# Patient Record
Sex: FEMALE | Race: WHITE | Employment: OTHER | URBAN - METROPOLITAN AREA
[De-identification: names, ages, dates, MRNs, and addresses within clinical notes are randomized per-mention and may not be internally consistent; named-entity substitution may affect disease eponyms.]

---

## 2017-02-23 ENCOUNTER — HOSPITAL ENCOUNTER (OUTPATIENT)
Dept: CT IMAGING | Age: 77
Discharge: HOME | End: 2017-02-23
Attending: INTERNAL MEDICINE
Payer: MEDICARE

## 2017-02-23 DIAGNOSIS — R10.84 GENERALIZED ABDOMINAL PAIN: ICD-10-CM

## 2017-02-23 LAB — CREAT BLD-MCNC: 0.9 MG/DL (ref 0.6–1)

## 2017-02-23 PROCEDURE — 74011636320 HC RX REV CODE- 636/320: Performed by: INTERNAL MEDICINE

## 2017-02-23 PROCEDURE — 74011000258 HC RX REV CODE- 258: Performed by: INTERNAL MEDICINE

## 2017-02-23 PROCEDURE — 74177 CT ABD & PELVIS W/CONTRAST: CPT

## 2017-02-23 PROCEDURE — 82565 ASSAY OF CREATININE: CPT

## 2017-02-23 RX ORDER — SODIUM CHLORIDE 0.9 % (FLUSH) 0.9 %
10 SYRINGE (ML) INJECTION
Status: COMPLETED | OUTPATIENT
Start: 2017-02-23 | End: 2017-02-23

## 2017-02-23 RX ADMIN — Medication 10 ML: at 14:54

## 2017-02-23 RX ADMIN — IOVERSOL 100 ML: 741 INJECTION INTRA-ARTERIAL; INTRAVENOUS at 14:54

## 2017-02-23 RX ADMIN — DIATRIZOATE MEGLUMINE AND DIATRIZOATE SODIUM 15 ML: 660; 100 LIQUID ORAL; RECTAL at 14:55

## 2017-02-23 RX ADMIN — SODIUM CHLORIDE 100 ML: 900 INJECTION, SOLUTION INTRAVENOUS at 14:54

## 2017-02-27 PROBLEM — C17.0 DUODENAL CANCER (HCC): Status: ACTIVE | Noted: 2017-02-27

## 2017-03-07 ENCOUNTER — PATIENT OUTREACH (OUTPATIENT)
Dept: CASE MANAGEMENT | Age: 77
End: 2017-03-07

## 2017-03-07 ENCOUNTER — HOSPITAL ENCOUNTER (OUTPATIENT)
Dept: PHYSICAL THERAPY | Age: 77
Discharge: HOME | End: 2017-03-07
Payer: MEDICARE

## 2017-03-07 PROCEDURE — G9172 VOICE GOAL STATUS: HCPCS

## 2017-03-07 PROCEDURE — G9171 VOICE CURRENT STATUS: HCPCS

## 2017-03-07 PROCEDURE — 92524 BEHAVRAL QUALIT ANALYS VOICE: CPT

## 2017-03-07 PROCEDURE — G9173 VOICE D/C STATUS: HCPCS

## 2017-03-07 NOTE — PROGRESS NOTES
Denilsonnati Cantu  : [facility] [place] [address], [place] [address].  [phone]   [phone]         OUTPATIENT SPEECH LANGUAGE PATHOLOGY: Initial Assessment and Discharge    ICD-10: Treatment Diagnosis: dysphonia R 49.0  REFERRING PHYSICIAN: Paolo Taylor MD Orders: speech therapy   Return Physician Appointment:   PAST MEDICAL HISTORY:   Ms. Galina Deng is a 64 y.o. female who  has a past medical history of Arthritis; Asthma; Cancer ([facility] [address] GERD (gastroesophageal reflux disease); Hypotension; IBS (irritable bowel syndrome); Insomnia; Mass of ampulla of Vater; Orthostatic hypotension; Parkinson's disease ([facility] [address] (dx ); Psychiatric disorder; PUD (peptic ulcer disease); Syncope and collapse; Unspecified adverse effect of anesthesia; and Weight loss. She also has no past medical history of Adverse effect of anesthesia; Difficult intubation; Malignant hyperthermia due to anesthesia; Nausea & vomiting; or Pseudocholinesterase deficiency. She also  has a past surgical history that includes ercp (2016); endoscopy (2016); lap cholecystectomy (); hysterectomy (); orthopaedic (Right, ); colonoscopy; gi (2012); gi (2016 at [facility]); hernia repair ( at [facility]); and gi (2016). MEDICAL/REFERRING DIAGNOSIS: Parkinson's disease [G20]  DATE OF ONSET:   PRIOR LEVEL OF FUNCTION: residing with spouse  PRECAUTIONS/ALLERGIES: Aspirin, morpheme  ASSESSMENT:  Mrs. Galina Deng is a 63 y/o female at the movement disorder clinic this date. She reported having internal head tremors in . She reported she went to two neurologists at that time and was told nothing was wrong. She told her she was given valium by each of them. She said she went to another neurologist in  and was told she has \"Parkison's in my head\". She reported she was placed on Sinemet and within two weeks the tremors went away.   She quit taking the valium at that time. She reported she takes it PRN. She was also put on a nebulizer. She reported she was in the hospital [facility] from [address] [place] [address] due to having a Whipple procedure. She reported no difficulties with her speech other than anomia at times. She reported she doesn't think she's quiet. However, her  is getting [facility] and she talks to loudly to him. She reported talking quieter to her sisters and children and reported they hear her without difficulty. She reported having xerostomia at times due to her medications. Therefore, she keeps water \"on hand\". She reported drinking 60-80 ounces of water daily. She also drinks tea. She drinks coffee approximately twice weekly. In regards to swallowing, she reported she doesn't eat anything hard as her teeth on the front \"aren't good\" and she is missing some of her back teeth. She reported no signs/sx aspiration. She reported coughing a lot at times without po. Several throat clears observed this date which pt attributed it to having mucous. Based on the objective data described below, Ms. Chidi Loaiza presents with a moderate voice disorder characterized by minimal hoarseness and mild breathiness, decreased vocal range and mildly decreased prosody and intonation. In conversational speech, the pt's dB ranged from 54-70 with an average of 63. Volume decreased to an average of 59 with oral reading of \"The Grandfather Passage\". When asked to produce sustained \"ah\", maximum duration was 5 seconds at 59 dB. When asked to produce a loud \"ah\" duration remained the same with an increase in dB to 87. This patient is a candidate for LSVT, which is an intense voice therapy designed for patients with Parkinson's disease. However, the pt reported she is not interested in ST at this time as she would like to wait until she has healed from surgery. PLAN OF CARE:  Discharge from [address] [place] [address]       Thank you for this referral,  MARY LOU Patricia, CCC-SLP        _______________________________________               _______________   Referring Physician Signature: Carenronaldo Larson,*     Date      SUBJECTIVE:  Pt cooperative. Present Symptoms: dysphonia       Current Medications:   Current Outpatient Prescriptions:     dicyclomine (BENTYL) 20 mg tablet, Take 20 mg by mouth every six (6) hours. , Disp: , Rfl:     carbidopa-levodopa (SINEMET)  mg per tablet, Take 2 Tabs by mouth four (4) times daily. Indications: IDIOPATHIC PARKINSONISM, Disp: 720 Tab, Rfl: 3    oxyCODONE IR (ROXICODONE) 5 mg immediate release tablet, Take 1 Tab by mouth every six (6) hours as needed for Pain. Max Daily Amount: 20 mg. Indications: PAIN, Disp: 60 Tab, Rfl: 0    sucralfate (CARAFATE) 1 gram tablet, Take 1 Tab by mouth four (4) times daily. , Disp: 360 Tab, Rfl: 1    zolpidem (AMBIEN) 10 mg tablet, Take 1 Tab by mouth nightly as needed. Max Daily Amount: 10 mg., Disp: 30 Tab, Rfl: 5    albuterol (PROVENTIL VENTOLIN) 2.5 mg /3 mL (0.083 %) nebulizer solution, 3 mL by Nebulization route every four (4) hours as needed for Wheezing., Disp: 160 Each, Rfl: 6    esomeprazole (NEXIUM) 40 mg capsule, TAKE 1 CAPSULE DAILY, Disp: 90 Cap, Rfl: 3    diazePAM (VALIUM) 5 mg tablet, Take 1 Tab by mouth two (2) times daily as needed. Max Daily Amount: 10 mg., Disp: 30 Tab, Rfl: 5    midodrine (PROAMITINE) 5 mg tablet, Take 5 mg by mouth three (3) times daily (with meals). Indications: Symptomatic Orthostatic Hypotension, Disp: , Rfl:     bisacodyl (DULCOLAX) 10 mg suppository, Insert 10 mg into rectum daily. , Disp: 1 Suppository, Rfl: 0    levalbuterol (XOPENEX) 0.63 mg/3 mL nebu, 3 mL by Nebulization route every six (6) hours as needed. , Disp: 1 mL, Rfl: 0    montelukast (SINGULAIR) 10 mg tablet, TAKE 1 TABLET DAILY, Disp: 90 Tab, Rfl: 1    PROAIR HFA 90 mcg/actuation inhaler, INHALE 2 PUFFS EVERY 6 HOURS AS NEEDED, Disp: 1 Inhaler, Rfl: 11    Date Last Reviewed: 3/7/17  History of reflux:  YES      Reflux medication: nexium   Social History/Home Situation: residing with spouse      Work/Activity History: retired    OBJECTIVE:  Objective Measure: Tool Used: National Outcomes Measurement System: Functional Communication Measures: VOICE  Score:  Initial: 4 Most Recent: X (Date: -- )   Interpretation of Tool: This FCM should not be used for individuals who have had a laryngectomy or tracheotomy, or for individuals with resonance disorders. o Level 1:  The individual is unable to use voice to communicate. Alternative means for communicating are used all of the time. The individual cannot participate in vocational, avocational, and social activities requiring voice. o Level 2:  Voice is not functional for communication most of the time. Alternative means for communicating must be used most of the time. The individuals participation in vocational, avocational, and social activities is significantly limited all of the time. o Level 3:  Voice is functional for communication, but is consistently distracting and interferes with communication by drawing attention to itself. Participation in vocational, avocational, and social activities is limited most of the time. o Level 4:  Voice is functional for communication, but sometimes distracting. The individuals ability to participate in vocational, avocational, and social activities requiring voice is occasionally affected in low-vocal demand activities, but consistently affected in high-vocal demand activities. o Level 5:  Voice occasionally sounds normal with self-monitoring, but there is some situational variation. The individuals ability to participate in vocational, avocational, and social activities requiring voice is rarely affected in low-vocal demand activities, but is occasionally affected in high-vocal demand activities. o Level 6:  Voice sounds normal most of the time across all settings and situations. Self-monitoring is consistent when needed. The individuals ability to participate in vocational, avocational, and social activities requiring voice is not affected in low vocal demand activities, but is rarely affected in high-vocal demand activities. o Level 7: The individuals ability to successfully and independently participate in vocational, avocational, and social activities requiring high-or low-vocal demands is not limited by voice. Self-monitoring is effectively used, but only occasionally needed. Score Level 7 Level 6 Level 5 Level 4 Level 3 Level 2 Level 1   Modifier CH CI CJ CK CL CM CN   ? Voice:     - CURRENT STATUS: CK - 40%-59% impaired, limited or restricted    - GOAL STATUS:  CJ - 20%-39% impaired, limited or restricted    - D/C STATUS:  CK - 40%-59% impaired, limited or restricted    Oral Motor Structure/Speech:     Voice: Voice Evaluation  Type of Assessment: Perceptual eval/non-instrumental  Vocal Onset: No impairment  Vocal Quality: Breathy, Hoarse  Vocal Intensity: Too soft  Vocal Pitch: Restricted range  Resonance: No impairment  Breath Support: Clavicular breathing  Intonation: decreased  Rate: WFL  Prosody: decreased  Overall Voice Impairment Severity: Moderate  S/Z Ratio: 7/3  Maximum Phonation Time (with sustained \"ah\"):    · Baseline volume: 5 seconds 69 dB, 4 seconds 68 dB  · Loud volume:  4 seconds 84 dB, 5 seconds 87 dB  Conversational Speech: 63  64 65 68 64 60 59 66 70 62 65 57 62 59 62 70 59 65 54 64 56   Oral Reading of the Grandfather Passage: 59  71 66 62 57 58 55 56 57 60 60 57 55 57 58 57 56 55 56 62 61   GRBAS:   Grade (overall degree of hoarseness): 1  Rough (impression of the irregularity of the vocal fold vibrations): 0  Breathy (impression of air loss through the glottis): 1  Aesthenic (weakness or lack of power in the voice): 1  Strained (perceptual impression of vocal hyperfunction): 0      TOTAL SCORE (Normal=0): 2  Vocally Abusive Behavior: · Excessive throat clearing    Glides: mildly decreased range   Pitch scale: mildly decreased range    Assessment/Reassessment only, no treatment provided today  Total Treatment Duration:  Time In: 1353  Time Out: Lory 36, MSP, CCC-SLP

## 2017-03-08 NOTE — PROGRESS NOTES
EV CM did not see patient at this clinic appointment per neurologist (Dr. Abbey Alpers) as dx of Parkinsons disease was not conclusive EV CM will f/u if needed.

## 2017-03-14 ENCOUNTER — HOSPITAL ENCOUNTER (OUTPATIENT)
Dept: LAB | Age: 77
Discharge: HOME | End: 2017-03-14
Payer: MEDICARE

## 2017-03-14 DIAGNOSIS — D37.9 NEOPLASM OF UNCERTAIN BEHAVIOR OF DIGESTIVE ORGAN: ICD-10-CM

## 2017-03-14 DIAGNOSIS — C17.0 DUODENAL CANCER (HCC): ICD-10-CM

## 2017-03-14 LAB
ALBUMIN SERPL BCP-MCNC: 3.5 G/DL (ref 3.2–4.6)
ALBUMIN SERPL BCP-MCNC: 3.5 G/DL (ref 3.2–4.6)
ALBUMIN/GLOB SERPL: 1.2 {RATIO} (ref 1.2–3.5)
ALBUMIN/GLOB SERPL: 1.3 {RATIO} (ref 1.2–3.5)
ALP SERPL-CCNC: 161 U/L (ref 50–136)
ALP SERPL-CCNC: 163 U/L (ref 50–136)
ALT SERPL-CCNC: 16 U/L (ref 12–65)
ALT SERPL-CCNC: 17 U/L (ref 12–65)
ANION GAP BLD CALC-SCNC: 4 MMOL/L (ref 7–16)
APPEARANCE UR: CLEAR
AST SERPL W P-5'-P-CCNC: 105 U/L (ref 15–37)
AST SERPL W P-5'-P-CCNC: 107 U/L (ref 15–37)
BASOPHILS # BLD AUTO: 0.1 K/UL (ref 0–0.2)
BASOPHILS # BLD: 1 % (ref 0–2)
BILIRUB DIRECT SERPL-MCNC: 0.1 MG/DL
BILIRUB SERPL-MCNC: 0.4 MG/DL (ref 0.2–1.1)
BILIRUB SERPL-MCNC: 0.5 MG/DL (ref 0.2–1.1)
BILIRUB UR QL: NEGATIVE
BUN SERPL-MCNC: 9 MG/DL (ref 8–23)
CALCIUM SERPL-MCNC: 8.9 MG/DL (ref 8.3–10.4)
CANCER AG19-9 SERPL-ACNC: 33.2 U/ML (ref 2–37)
CHLORIDE SERPL-SCNC: 110 MMOL/L (ref 98–107)
CO2 SERPL-SCNC: 31 MMOL/L (ref 21–32)
COLOR UR: YELLOW
CREAT SERPL-MCNC: 0.85 MG/DL (ref 0.6–1)
DIFFERENTIAL METHOD BLD: ABNORMAL
EOSINOPHIL # BLD: 0.2 K/UL (ref 0–0.8)
EOSINOPHIL NFR BLD: 3 % (ref 0.5–7.8)
ERYTHROCYTE [DISTWIDTH] IN BLOOD BY AUTOMATED COUNT: 14.9 % (ref 11.9–14.6)
GLOBULIN SER CALC-MCNC: 2.7 G/DL (ref 2.3–3.5)
GLOBULIN SER CALC-MCNC: 2.9 G/DL (ref 2.3–3.5)
GLUCOSE SERPL-MCNC: 109 MG/DL (ref 65–100)
GLUCOSE UR STRIP.AUTO-MCNC: NEGATIVE MG/DL
HCT VFR BLD AUTO: 37.5 % (ref 35.8–46.3)
HGB BLD-MCNC: 12 G/DL (ref 11.7–15.4)
HGB UR QL STRIP: NEGATIVE
IMM GRANULOCYTES # BLD: 0 K/UL (ref 0–0.5)
IMM GRANULOCYTES NFR BLD AUTO: 0.2 % (ref 0–5)
KETONES UR QL STRIP.AUTO: NEGATIVE MG/DL
LEUKOCYTE ESTERASE UR QL STRIP.AUTO: NEGATIVE
LYMPHOCYTES # BLD AUTO: 28 % (ref 13–44)
LYMPHOCYTES # BLD: 1.8 K/UL (ref 0.5–4.6)
MCH RBC QN AUTO: 28.9 PG (ref 26.1–32.9)
MCHC RBC AUTO-ENTMCNC: 32 G/DL (ref 31.4–35)
MCV RBC AUTO: 90.4 FL (ref 79.6–97.8)
MONOCYTES # BLD: 0.4 K/UL (ref 0.1–1.3)
MONOCYTES NFR BLD AUTO: 6 % (ref 4–12)
NEUTS SEG # BLD: 4 K/UL (ref 1.7–8.2)
NEUTS SEG NFR BLD AUTO: 62 % (ref 43–78)
NITRITE UR QL STRIP.AUTO: NEGATIVE
PH UR STRIP: 6 [PH] (ref 5–9)
PLATELET # BLD AUTO: 216 K/UL (ref 150–450)
PMV BLD AUTO: 13.5 FL (ref 10.8–14.1)
POTASSIUM SERPL-SCNC: 4.8 MMOL/L (ref 3.5–5.1)
PROT SERPL-MCNC: 6.2 G/DL (ref 6.3–8.2)
PROT SERPL-MCNC: 6.4 G/DL (ref 6.3–8.2)
PROT UR STRIP-MCNC: NEGATIVE MG/DL
RBC # BLD AUTO: 4.15 M/UL (ref 4.05–5.25)
SODIUM SERPL-SCNC: 145 MMOL/L (ref 136–145)
SP GR UR REFRACTOMETRY: 1.01 (ref 1–1.02)
UROBILINOGEN UR QL STRIP.AUTO: 0.2 EU/DL (ref 0.2–1)
WBC # BLD AUTO: 6.4 K/UL (ref 4.3–11.1)

## 2017-03-14 PROCEDURE — 81003 URINALYSIS AUTO W/O SCOPE: CPT | Performed by: SURGERY

## 2017-03-14 PROCEDURE — 80053 COMPREHEN METABOLIC PANEL: CPT | Performed by: SURGERY

## 2017-03-14 PROCEDURE — 86301 IMMUNOASSAY TUMOR CA 19-9: CPT | Performed by: SURGERY

## 2017-03-14 PROCEDURE — 36415 COLL VENOUS BLD VENIPUNCTURE: CPT | Performed by: SURGERY

## 2017-03-14 PROCEDURE — 85025 COMPLETE CBC W/AUTO DIFF WBC: CPT | Performed by: SURGERY

## 2017-03-14 PROCEDURE — 80076 HEPATIC FUNCTION PANEL: CPT | Performed by: SURGERY

## 2017-03-16 ENCOUNTER — PATIENT OUTREACH (OUTPATIENT)
Dept: CASE MANAGEMENT | Age: 77
End: 2017-03-16

## 2017-03-16 NOTE — ACP (ADVANCE CARE PLANNING)
3-14-17 - Patient here today to see Dr. Lacie Geiger in follow up. Post op 6 months from [place]. Maintaining weight but having some vague pain in abdominal area. Dr. Lacie Geiger ordered labs today: urinalysis, LFT, CMP, CBC, CA 19-9. Dr. Lacie Geiger will review this afternoon and give direction of what the patient will need. Follow up will be repeat CT CAP  in 6 months and then to return to see Dr. aLcie Geiger after scan.

## 2017-03-21 ENCOUNTER — HOSPITAL ENCOUNTER (OUTPATIENT)
Dept: ULTRASOUND IMAGING | Age: 77
Discharge: HOME | End: 2017-03-21
Attending: SURGERY
Payer: MEDICARE

## 2017-03-21 DIAGNOSIS — R74.8 ABNORMAL LIVER ENZYMES: ICD-10-CM

## 2017-03-21 PROCEDURE — 76705 ECHO EXAM OF ABDOMEN: CPT

## 2017-03-25 PROBLEM — G90.3 NEUROLOGIC ORTHOSTATIC HYPOTENSION (HCC): Status: ACTIVE | Noted: 2017-03-25

## 2017-03-25 PROBLEM — G20 PARKINSON DISEASE (HCC): Status: ACTIVE | Noted: 2017-03-25

## 2017-03-25 PROBLEM — R25.1 TREMOR: Status: ACTIVE | Noted: 2017-03-25

## 2017-03-27 ENCOUNTER — PATIENT OUTREACH (OUTPATIENT)
Dept: CASE MANAGEMENT | Age: 77
End: 2017-03-27

## 2017-04-04 ENCOUNTER — HOSPITAL ENCOUNTER (OUTPATIENT)
Dept: MRI IMAGING | Age: 77
Discharge: HOME | End: 2017-04-04
Attending: PSYCHIATRY & NEUROLOGY
Payer: MEDICARE

## 2017-04-04 DIAGNOSIS — R94.01 ABNORMAL EEG: ICD-10-CM

## 2017-04-04 DIAGNOSIS — R56.9 SEIZURE (HCC): ICD-10-CM

## 2017-04-04 PROCEDURE — 74011250636 HC RX REV CODE- 250/636: Performed by: PSYCHIATRY & NEUROLOGY

## 2017-04-04 PROCEDURE — A9577 INJ MULTIHANCE: HCPCS | Performed by: PSYCHIATRY & NEUROLOGY

## 2017-04-04 PROCEDURE — 70553 MRI BRAIN STEM W/O & W/DYE: CPT

## 2017-04-04 RX ORDER — SODIUM CHLORIDE 0.9 % (FLUSH) 0.9 %
10 SYRINGE (ML) INJECTION
Status: COMPLETED | OUTPATIENT
Start: 2017-04-04 | End: 2017-04-04

## 2017-04-04 RX ADMIN — Medication 10 ML: at 17:22

## 2017-04-04 RX ADMIN — GADOBENATE DIMEGLUMINE 10 ML: 529 INJECTION, SOLUTION INTRAVENOUS at 17:22

## 2017-08-18 ENCOUNTER — HOSPITAL ENCOUNTER (OUTPATIENT)
Dept: MAMMOGRAPHY | Age: 77
Discharge: HOME | End: 2017-08-18
Attending: FAMILY MEDICINE
Payer: MEDICARE

## 2017-08-18 DIAGNOSIS — Z12.31 ENCOUNTER FOR SCREENING MAMMOGRAM FOR MALIGNANT NEOPLASM OF BREAST: ICD-10-CM

## 2017-08-18 PROCEDURE — 77067 SCR MAMMO BI INCL CAD: CPT

## 2017-08-21 ENCOUNTER — HOSPITAL ENCOUNTER (OUTPATIENT)
Dept: CT IMAGING | Age: 77
Discharge: HOME | End: 2017-08-21
Attending: SURGERY
Payer: MEDICARE

## 2017-08-21 VITALS — BODY MASS INDEX: 18.95 KG/M2 | WEIGHT: 103 LBS | HEIGHT: 62 IN

## 2017-08-21 DIAGNOSIS — C17.0 DUODENAL CANCER (HCC): ICD-10-CM

## 2017-08-21 LAB — CREAT BLD-MCNC: 0.9 MG/DL (ref 0.6–1)

## 2017-08-21 PROCEDURE — 74011636320 HC RX REV CODE- 636/320: Performed by: SURGERY

## 2017-08-21 PROCEDURE — 74177 CT ABD & PELVIS W/CONTRAST: CPT

## 2017-08-21 PROCEDURE — 74011000258 HC RX REV CODE- 258: Performed by: SURGERY

## 2017-08-21 PROCEDURE — 82565 ASSAY OF CREATININE: CPT

## 2017-08-21 RX ORDER — SODIUM CHLORIDE 0.9 % (FLUSH) 0.9 %
10 SYRINGE (ML) INJECTION
Status: COMPLETED | OUTPATIENT
Start: 2017-08-21 | End: 2017-08-21

## 2017-08-21 RX ADMIN — SODIUM CHLORIDE 100 ML: 900 INJECTION, SOLUTION INTRAVENOUS at 13:05

## 2017-08-21 RX ADMIN — Medication 10 ML: at 13:05

## 2017-08-21 RX ADMIN — IOPAMIDOL 100 ML: 755 INJECTION, SOLUTION INTRAVENOUS at 13:05

## 2017-08-25 PROBLEM — I95.1 ORTHOSTATIC HYPOTENSION: Status: ACTIVE | Noted: 2017-08-25

## 2017-08-25 PROBLEM — R55 SYNCOPE: Status: ACTIVE | Noted: 2017-08-25

## 2017-12-05 ENCOUNTER — APPOINTMENT (RX ONLY)
Dept: URBAN - METROPOLITAN AREA CLINIC 24 | Facility: CLINIC | Age: 77
Setting detail: DERMATOLOGY
End: 2017-12-05

## 2017-12-05 DIAGNOSIS — L57.0 ACTINIC KERATOSIS: ICD-10-CM

## 2017-12-05 DIAGNOSIS — Z85.828 PERSONAL HISTORY OF OTHER MALIGNANT NEOPLASM OF SKIN: ICD-10-CM

## 2017-12-05 DIAGNOSIS — L82.1 OTHER SEBORRHEIC KERATOSIS: ICD-10-CM

## 2017-12-05 PROCEDURE — ? PATIENT SPECIFIC COUNSELING

## 2017-12-05 PROCEDURE — ? COUNSELING

## 2017-12-05 PROCEDURE — 99213 OFFICE O/P EST LOW 20 MIN: CPT

## 2017-12-05 PROCEDURE — ? ADDITIONAL NOTES

## 2017-12-05 PROCEDURE — ? PRESCRIPTION

## 2017-12-05 RX ORDER — FLUOROURACIL 2 G/40G
CREAM TOPICAL BID
Qty: 1 | Refills: 0 | Status: ERX | COMMUNITY
Start: 2017-12-05

## 2017-12-05 RX ORDER — HYDROCORTISONE 2.5 %
OINTMENT (GRAM) TOPICAL
Qty: 1 | Refills: 0 | Status: ERX | COMMUNITY
Start: 2017-12-05

## 2017-12-05 RX ADMIN — FLUOROURACIL: 2 CREAM TOPICAL at 19:59

## 2017-12-05 RX ADMIN — Medication: at 19:59

## 2017-12-05 ASSESSMENT — LOCATION DETAILED DESCRIPTION DERM
LOCATION DETAILED: LEFT LATERAL SUPERIOR CHEST
LOCATION DETAILED: RIGHT LATERAL SUPERIOR CHEST

## 2017-12-05 ASSESSMENT — LOCATION SIMPLE DESCRIPTION DERM: LOCATION SIMPLE: CHEST

## 2017-12-05 ASSESSMENT — LOCATION ZONE DERM: LOCATION ZONE: TRUNK

## 2017-12-27 ENCOUNTER — ANESTHESIA EVENT (OUTPATIENT)
Dept: ENDOSCOPY | Age: 77
End: 2017-12-27
Payer: MEDICARE

## 2017-12-27 RX ORDER — SODIUM CHLORIDE, SODIUM LACTATE, POTASSIUM CHLORIDE, CALCIUM CHLORIDE 600; 310; 30; 20 MG/100ML; MG/100ML; MG/100ML; MG/100ML
100 INJECTION, SOLUTION INTRAVENOUS CONTINUOUS
Status: CANCELLED | OUTPATIENT
Start: 2017-12-27

## 2017-12-27 RX ORDER — ONDANSETRON 2 MG/ML
4 INJECTION INTRAMUSCULAR; INTRAVENOUS ONCE
Status: CANCELLED | OUTPATIENT
Start: 2017-12-27 | End: 2017-12-27

## 2017-12-27 RX ORDER — NALOXONE HYDROCHLORIDE 0.4 MG/ML
0.1 INJECTION, SOLUTION INTRAMUSCULAR; INTRAVENOUS; SUBCUTANEOUS AS NEEDED
Status: CANCELLED | OUTPATIENT
Start: 2017-12-27

## 2017-12-27 RX ORDER — HYDROMORPHONE HYDROCHLORIDE 2 MG/ML
0.5 INJECTION, SOLUTION INTRAMUSCULAR; INTRAVENOUS; SUBCUTANEOUS
Status: CANCELLED | OUTPATIENT
Start: 2017-12-27

## 2017-12-27 RX ORDER — DIPHENHYDRAMINE HYDROCHLORIDE 50 MG/ML
12.5 INJECTION, SOLUTION INTRAMUSCULAR; INTRAVENOUS
Status: CANCELLED | OUTPATIENT
Start: 2017-12-27

## 2017-12-27 RX ORDER — OXYCODONE HYDROCHLORIDE 5 MG/1
5 TABLET ORAL
Status: CANCELLED | OUTPATIENT
Start: 2017-12-27

## 2017-12-27 RX ORDER — OXYCODONE HYDROCHLORIDE 5 MG/1
10 TABLET ORAL
Status: CANCELLED | OUTPATIENT
Start: 2017-12-27

## 2017-12-27 RX ORDER — ALBUTEROL SULFATE 0.83 MG/ML
2.5 SOLUTION RESPIRATORY (INHALATION) AS NEEDED
Status: CANCELLED | OUTPATIENT
Start: 2017-12-27

## 2017-12-27 NOTE — ANESTHESIA PREPROCEDURE EVALUATION
Anesthetic History               Review of Systems / Medical History  Patient summary reviewed, nursing notes reviewed and pertinent labs reviewed    Pulmonary            Asthma : well controlled       Neuro/Psych              Cardiovascular                  Exercise tolerance: >4 METS     GI/Hepatic/Renal     GERD: well controlled           Endo/Other             Other Findings              Physical Exam    Airway  Mallampati: II  TM Distance: 4 - 6 cm  Neck ROM: normal range of motion   Mouth opening: Normal     Cardiovascular  Regular rate and rhythm,  S1 and S2 normal,  no murmur, click, rub, or gallop             Dental  No notable dental hx       Pulmonary  Breath sounds clear to auscultation               Abdominal         Other Findings            Anesthetic Plan    ASA: 3  Anesthesia type: general          Induction: Intravenous  Anesthetic plan and risks discussed with: Patient and Spouse           Anesthetic History               Review of Systems / Medical History  Patient summary reviewed and pertinent labs reviewed    Pulmonary            Asthma : well controlled       Neuro/Psych         Neuromuscular disease (Parkinson's disease)     Cardiovascular                  Exercise tolerance: >4 METS     GI/Hepatic/Renal     GERD: well controlled           Endo/Other             Other Findings              Physical Exam    Airway  Mallampati: II  TM Distance: 4 - 6 cm  Neck ROM: normal range of motion   Mouth opening: Normal     Cardiovascular  Regular rate and rhythm,  S1 and S2 normal,  no murmur, click, rub, or gallop             Dental    Dentition: Full upper dentures     Pulmonary  Breath sounds clear to auscultation               Abdominal         Other Findings            Anesthetic Plan    ASA: 2  Anesthesia type: total IV anesthesia          Induction: Intravenous  Anesthetic plan and risks discussed with: Patient          Anesthetic History               Review of Systems / Medical History  Patient summary reviewed and pertinent labs reviewed    Pulmonary            Asthma : well controlled       Neuro/Psych         Neuromuscular disease (Parkinson's disease)     Cardiovascular                  Exercise tolerance: >4 METS     GI/Hepatic/Renal     GERD: well controlled           Endo/Other             Other Findings              Physical Exam    Airway  Mallampati: II  TM Distance: 4 - 6 cm  Neck ROM: normal range of motion   Mouth opening: Normal     Cardiovascular  Regular rate and rhythm,  S1 and S2 normal,  no murmur, click, rub, or gallop             Dental    Dentition: Full upper dentures     Pulmonary  Breath sounds clear to auscultation               Abdominal         Other Findings            Anesthetic Plan    ASA: 2  Anesthesia type: total IV anesthesia          Induction: Intravenous  Anesthetic plan and risks discussed with: Patient

## 2017-12-28 ENCOUNTER — HOSPITAL ENCOUNTER (OUTPATIENT)
Age: 77
Setting detail: OUTPATIENT SURGERY
Discharge: HOME | End: 2017-12-28
Attending: INTERNAL MEDICINE | Admitting: INTERNAL MEDICINE
Payer: MEDICARE

## 2017-12-28 ENCOUNTER — ANESTHESIA (OUTPATIENT)
Dept: ENDOSCOPY | Age: 77
End: 2017-12-28
Payer: MEDICARE

## 2017-12-28 VITALS
OXYGEN SATURATION: 99 % | DIASTOLIC BLOOD PRESSURE: 56 MMHG | BODY MASS INDEX: 18.95 KG/M2 | HEIGHT: 62 IN | TEMPERATURE: 96.8 F | HEART RATE: 100 BPM | SYSTOLIC BLOOD PRESSURE: 114 MMHG | RESPIRATION RATE: 18 BRPM | WEIGHT: 103 LBS

## 2017-12-28 PROCEDURE — 74011250636 HC RX REV CODE- 250/636: Performed by: ANESTHESIOLOGY

## 2017-12-28 PROCEDURE — 36415 COLL VENOUS BLD VENIPUNCTURE: CPT | Performed by: INTERNAL MEDICINE

## 2017-12-28 PROCEDURE — 77030011640 HC PAD GRND REM COVD -A: Performed by: INTERNAL MEDICINE

## 2017-12-28 PROCEDURE — 76060000033 HC ANESTHESIA 1 TO 1.5 HR: Performed by: INTERNAL MEDICINE

## 2017-12-28 PROCEDURE — 74011000250 HC RX REV CODE- 250

## 2017-12-28 PROCEDURE — 88305 TISSUE EXAM BY PATHOLOGIST: CPT | Performed by: INTERNAL MEDICINE

## 2017-12-28 PROCEDURE — 77030022875 HC PRB AR PLSM COAG ERBE -C: Performed by: INTERNAL MEDICINE

## 2017-12-28 PROCEDURE — 86677 HELICOBACTER PYLORI ANTIBODY: CPT | Performed by: INTERNAL MEDICINE

## 2017-12-28 PROCEDURE — 76040000026: Performed by: INTERNAL MEDICINE

## 2017-12-28 PROCEDURE — 77030013991 HC SNR POLYP ENDOSC BSC -A: Performed by: INTERNAL MEDICINE

## 2017-12-28 PROCEDURE — 74011250636 HC RX REV CODE- 250/636

## 2017-12-28 RX ORDER — PROPOFOL 10 MG/ML
INJECTION, EMULSION INTRAVENOUS AS NEEDED
Status: DISCONTINUED | OUTPATIENT
Start: 2017-12-28 | End: 2017-12-28 | Stop reason: HOSPADM

## 2017-12-28 RX ORDER — LIDOCAINE HYDROCHLORIDE 10 MG/ML
0.1 INJECTION INFILTRATION; PERINEURAL AS NEEDED
Status: DISCONTINUED | OUTPATIENT
Start: 2017-12-28 | End: 2017-12-28 | Stop reason: HOSPADM

## 2017-12-28 RX ORDER — LIDOCAINE HYDROCHLORIDE 20 MG/ML
INJECTION, SOLUTION EPIDURAL; INFILTRATION; INTRACAUDAL; PERINEURAL AS NEEDED
Status: DISCONTINUED | OUTPATIENT
Start: 2017-12-28 | End: 2017-12-28 | Stop reason: HOSPADM

## 2017-12-28 RX ORDER — MIDAZOLAM HYDROCHLORIDE 1 MG/ML
2 INJECTION, SOLUTION INTRAMUSCULAR; INTRAVENOUS ONCE
Status: DISCONTINUED | OUTPATIENT
Start: 2017-12-28 | End: 2017-12-28 | Stop reason: HOSPADM

## 2017-12-28 RX ORDER — MIDAZOLAM HYDROCHLORIDE 1 MG/ML
2 INJECTION, SOLUTION INTRAMUSCULAR; INTRAVENOUS
Status: DISCONTINUED | OUTPATIENT
Start: 2017-12-28 | End: 2017-12-28 | Stop reason: HOSPADM

## 2017-12-28 RX ORDER — FENTANYL CITRATE 50 UG/ML
100 INJECTION, SOLUTION INTRAMUSCULAR; INTRAVENOUS ONCE
Status: DISCONTINUED | OUTPATIENT
Start: 2017-12-28 | End: 2017-12-28 | Stop reason: HOSPADM

## 2017-12-28 RX ORDER — PROPOFOL 10 MG/ML
INJECTION, EMULSION INTRAVENOUS
Status: DISCONTINUED | OUTPATIENT
Start: 2017-12-28 | End: 2017-12-28 | Stop reason: HOSPADM

## 2017-12-28 RX ORDER — SODIUM CHLORIDE, SODIUM LACTATE, POTASSIUM CHLORIDE, CALCIUM CHLORIDE 600; 310; 30; 20 MG/100ML; MG/100ML; MG/100ML; MG/100ML
100 INJECTION, SOLUTION INTRAVENOUS CONTINUOUS
Status: DISCONTINUED | OUTPATIENT
Start: 2017-12-28 | End: 2017-12-28 | Stop reason: HOSPADM

## 2017-12-28 RX ADMIN — PROPOFOL 10 MG: 10 INJECTION, EMULSION INTRAVENOUS at 08:11

## 2017-12-28 RX ADMIN — PROPOFOL 160 MCG/KG/MIN: 10 INJECTION, EMULSION INTRAVENOUS at 08:11

## 2017-12-28 RX ADMIN — SODIUM CHLORIDE, SODIUM LACTATE, POTASSIUM CHLORIDE, AND CALCIUM CHLORIDE 100 ML/HR: 600; 310; 30; 20 INJECTION, SOLUTION INTRAVENOUS at 07:21

## 2017-12-28 RX ADMIN — LIDOCAINE HYDROCHLORIDE 60 MG: 20 INJECTION, SOLUTION EPIDURAL; INFILTRATION; INTRACAUDAL; PERINEURAL at 08:11

## 2017-12-28 NOTE — PROCEDURES
Esophagogastroduodenoscopy    DATE of PROCEDURE: 12/28/2017    INDICATION:  1. Dyspepsia  2. Epigastric pain    POSTPROCEDURE DIAGNOSIS:  1. Esophagitis  2. Hiatal hernia  3. Retained food in stomach  4. Whipple procedure anatomy  5. Anastomotic ulcer    MEDICATIONS ADMINISTERED: MAC. Further details per anesthesia note. INSTRUMENT:[id number]    PROCEDURE:  After obtaining informed consent, the patient was placed in the left lateral position and sedated. The endoscope was advanced under direct vision without difficulty. The esophagus, stomach (including retroflexed views) and small bowel were evaluated. The patient was taken to the recovery area in stable condition. FINDINGS:  ESOPHAGUS: Small hiatal hernia. Mild esophagitis. Otherwise normal exam.  STOMACH: Mild to moderate amount of retained food. Post-Whipple anatomy. 10 mm ulcer located at gastric-small bowel anastomosis. Significant edema around ulcer site is likely causing slowing of gastric emptying. Gastroscope was advanced into small bowel which appears normal.      Estimated blood loss: 0-minimal   Specimens obtained during procedure: none    PLAN:  1. Check H.pylori serology  2. Continue Carafate  3. Increase Nexium to 40 mg PO BID  4. Avoid NSAIDS  5.  Proceed with colonoscopy    Franny Horner MD  [facility]

## 2017-12-28 NOTE — ROUTINE PROCESS
VSS. Discharge instructions reviewed with patient and  and copy spent home with patient. Questions answered. Discharged via car, wheeled out by [employer]. IV discontinued prior to discharge. Dr. Tiburcio Ruiz spoke with patient prior to discharge. Lab radha H.pylori serology prior to d/c.

## 2017-12-28 NOTE — H&P
Gastroenterology Outpatient History and Physical    Patient: Kirit Saul    Physician: Elvira Mraavilla MD    Vital Signs: Blood pressure 114/57, pulse 78, temperature 97.4 °F (36.3 °C), resp. rate 14, height 5' 2\" (1.575 m), weight 46.7 kg (103 lb), SpO2 100 %, not currently breastfeeding. Allergies:    Allergies   Allergen Reactions    Aspirin Other (comments)     Painful stomach-- Hx of PUD    Morphine Other (comments)     Hallucinations    Synthroid [Levothyroxine] Hives       Chief Complaint: Epigastric pain, h/o colon polyp    History of Present Illness: As Above    Justification for Procedure: As Above    History:  Past Medical History:   Diagnosis Date    Arthritis     oa-  knees and elbows    Asthma     daily inhalers & nebulizer daily    Cancer (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 Patel Goldman disorder     anxiety: PRN meds    PUD (peptic ulcer disease)     Syncope and collapse     2 episodes in 8 months per spouse-- last one 6/20/16    Unspecified adverse effect of anesthesia     delayed awakening---     Weight loss     25 lbs over approx 2 months per spouse      Past Surgical History:   Procedure Laterality Date    HX BREAST BIOPSY Left 25 YEARS AGO    HX COLONOSCOPY      HX ENDOSCOPY  08/2016    failed ampullectomy    HX ERCP  06/2016    HX GI  2/2012    bowel obstruction     [facility]X GI  4/2016 at [facility]    colonoscopy    HX GI  09/12/2016    Whipple    HX HERNIA REPAIR  2014 at [facility]    mid abd     [address] LAP CHOLECYSTECTOMY  2001  HX ORTHOPAEDIC Right 2008    R FOOT SURG     HEEL SPUR      Social History     Social History    Marital status:      Spouse name: N/A    Number of children: N/A    Years of education: N/A     Social History Main Topics    Smoking status: Never Smoker    Smokeless tobacco: Never Used    Alcohol use No    Drug use: No    Sexual activity: Not Asked     Other Topics Concern    None     Social History Narrative      Family History   Problem Relation Age of Onset    Lung Disease Mother     Cancer Father      prostate    Cancer Sister      colon cancer    Cancer Brother        Medications:   Prior to Admission medications    Medication Sig Start Date End Date Taking? Authorizing Provider   carbidopa-levodopa ER (SINEMET CR)  mg per tablet Take 1 Tab by mouth six (6) times daily. Yes Historical Provider   hyoscyamine SL (LEVSIN/SL) 0.125 mg SL tablet 1 Tab by SubLINGual route every six (6) hours as needed for Cramping. 10/18/17  Yes Cat Cueto MD   sucralfate (CARAFATE) 1 gram tablet TAKE 1 TABLET FOUR TIMES A DAY 9/11/17  Yes Cat Cueto MD   albuterol (PROVENTIL VENTOLIN) 2.5 mg /3 mL (0.083 %) nebulizer solution USE 3 ML VIA NEBULIZER EVERY 4 HOURS AS NEEDED FOR WHEEZING 8/13/17  Yes Cat Cueto MD   midodrine (PROAMITINE) 5 mg tablet Take 1 Tab by mouth three (3) times daily (with meals). Indications: Symptomatic Orthostatic Hypotension 7/3/17  Yes Riki Aguirre MD   diazePAM (VALIUM) 10 mg tablet Take 1 Tab by mouth two (2) times daily as needed. Max Daily Amount: 20 mg. 6/19/17  Yes Cat Cueto MD   oxyCODONE IR (ROXICODONE) 5 mg immediate release tablet Take 1 Tab by mouth every six (6) hours as needed for Pain. Max Daily Amount: 20 mg. Indications: Pain 6/19/17  Yes Cat Cueto MD   montelukast (SINGULAIR) 10 mg tablet Take 1 Tab by mouth daily.  6/19/17  Yes Cat Cueto MD   albuterol [facility]) 90 mcg/actuation inhaler Take 2 Puffs by inhalation every six (6) hours as needed for Wheezing. 6/19/17  Yes Humaira Ramsey MD   zolpidem (AMBIEN) 10 mg tablet Take 1 Tab by mouth nightly as needed. Max Daily Amount: 10 mg. Patient taking differently: Take 10 mg by mouth nightly. 6/19/17  Yes Humaira Ramsey MD   bisacodyl (DULCOLAX) 10 mg suppository Insert 10 mg into rectum daily. 10/4/16  Yes CLEMENTE Burns   levalbuterol (XOPENEX) 0.63 mg/3 mL nebu 3 mL by Nebulization route every six (6) hours as needed.  10/4/16  Yes CLEMENTE Burns   dicyclomine (BENTYL) 20 mg tablet TAKE 1 TABLET EVERY 6 HOURS 11/29/17   Humaira Ramsey MD       Physical Exam:         Heart: regular rate and rhythm, S1, S2 normal, no murmur, click, rub or gallop   Lungs: chest clear, no wheezing, rales, normal symmetric air entry, Heart exam - S1, S2 normal, no murmur, no gallop, rate regular   Abdominal: Bowel sounds are normal, Bowel sounds active, liver is not enlarged, spleen is not enlarged           Findings/Diagnosis: h/o colon polyp, dyspepsia    EGD/colonoscopy        Signed:  Murphy Norris MD 12/28/2017

## 2017-12-28 NOTE — DISCHARGE INSTRUCTIONS
Gastrointestinal Esophagogastroduodenoscopy (EGD) - Upper Exam Discharge Instructions    1. Call Dr. Jennifer Sexton for any problems or questions. 2. Contact the doctor's office for follow up appointment as directed. 3. Medication may cause drowsiness for several hours, therefore, do not drive or operate machinery for remainder of the day. 4. No alcohol today. 5. Ordinarily, you may resume regular diet and activity after exam unless otherwise specified by your physician. 6. For mild soreness in your throat you may use Cepacol throat lozenges or warm salt-water gargles as needed. Additional instructions:  1. Continue with the Carafate medication you are currently taking. 2. Dr. Jennifer Sexton is increasing your wilcox of Nexium, the prescription was called into your pharmacy at [facility]. 3. Continue to avoid NSAID medications such as Aspirin, Advil, Aleve, Motrin, Goodie Powder, and Naproxen. 4. Dr. Mert Bowers office will call you about the results of the biopsies. Instructions given to [employer] and other family members. Gastrointestinal Colonoscopy/Flexible Sigmoidoscopy - Lower Exam Discharge Instructions  1. Call Dr. Jennifer Sexton for any problems or questions. 2. Contact the doctors office for follow up appointment as directed  3. Medication may cause drowsiness for several hours, therefore, do not drive or operate machinery for remainder of the day. 4. No alcohol today. 5. Ordinarily, you may resume regular diet and activity after exam unless otherwise specified by your physician. 6. Because of air put into your colon during exam, you may experience some abdominal distension, relieved by the passage of gas, for several hours. 7. Contact your physician if you have any of the following:  a. Excessive amount of bleeding - large amount when having a bowel movement. Occasional specks of blood with bowel movement would not be unusual.  b. Severe abdominal pain  c. Fever or Chills  8.  Polyp Removal - follow these additional instructions  a. Take Metamucil - 1 tablespoon in juice every morning for 3 days, if needed  b. No Aspirin, Advil, Aleve, Nuprin, Ibuprofen, or medications that contain these drugs for 2 weeks. Any additional instructions:   1. Dr. Lex De Los Santos office will call you about the results of the biopsies. 2. Repeat colonoscopy in 3 years. 3. Follow up with Dr. Zhang Nathan in 4-6 weeks. Instructions given to [employer] and other family members.

## 2017-12-28 NOTE — PROCEDURES
COLONOSCOPY    DATE of PROCEDURE: 12/28/2017    INDICATION:  1. History of colon polyp    POSTPROCEDURE DIAGNOSIS:  1. Colon polyp  2. Internal hemorrhoids    MEDICATION:   MAC. Further details per anesthesia note. INSTRUMENT: [id number]    PROCEDURE: After obtaining informed consent, the patient was placed in the left lateral position and sedated. The endoscope was advanced to the cecum where the appendiceal orifice and ileocecal valve were identified. On withdrawal, the colon was carefully visualized in a 360 degree fashion. The patient was taken to the recovery area in stable condition. Bowel prep quality was poor. FINDINGS:  Rectum: Small internal hemorrhoids. Otherwise normal exam.  Sigmoid: normal  Descending Colon: normal  Transverse Colon: normal  Ascending Colon: normal  Cecum: Residual polyp in cecum with 8 mm sessile and 4 mm sessile lesions. Snare-cautery polypectomy x2. APC with 40 W and 0.5 L/min flow used to treat mucosa adjacent to polypectomy site. Terminal ileum: not entered    Estimated blood loss: 0-minimal   Specimens obtained during procedure:   1. Cecal polyp      PLAN:  1. Follow-up pathology  2. Repeat colonoscopy in 3 years  3.  Follow-up with Dr. Johnathon Wall in 4-6 weeks    Kelsea Gu MD

## 2017-12-28 NOTE — ANESTHESIA POSTPROCEDURE EVALUATION
Post-Anesthesia Evaluation and Assessment    Patient: Sd Clemons MRN: [id number]  SSN: [id number]    YOB: 1940  Age: 76 y.o. Sex: female       Cardiovascular Function/Vital Signs  Visit Vitals    /58    Pulse 73    Temp 36 °C (96.8 °F)    Resp 18    Ht 5' 2\" (1.575 m)    Wt 46.7 kg (103 lb)    SpO2 100%    Breastfeeding No    BMI 18.84 kg/m2       Patient is status post general anesthesia for Procedure(s):  ESOPHAGOGASTRODUODENOSCOPY (EGD)  COLONOSCOPY/ 19  ENDOSCOPIC POLYPECTOMY  ENDOSCOPIC ARGON PLASMA COAGULATION. Nausea/Vomiting: None    Postoperative hydration reviewed and adequate. Pain:  Pain Scale 1: Numeric (0 - 10) (12/28/17 0916)  Pain Intensity 1: 0 (12/28/17 0916)   Managed    Neurological Status: At baseline    Mental Status and Level of Consciousness: Arousable    Pulmonary Status:   O2 Device: Room air (12/28/17 0916)   Adequate oxygenation and airway patent    Complications related to anesthesia: None    Post-anesthesia assessment completed.  No concerns    Signed By: Zhang Prado MD     December 28, 2017

## 2017-12-29 LAB — H PYLORI AB SER QL: NEGATIVE

## 2018-02-05 ENCOUNTER — APPOINTMENT (RX ONLY)
Dept: URBAN - METROPOLITAN AREA CLINIC 23 | Facility: CLINIC | Age: 78
Setting detail: DERMATOLOGY
End: 2018-02-05

## 2018-02-05 DIAGNOSIS — L29.89 OTHER PRURITUS: ICD-10-CM

## 2018-02-05 DIAGNOSIS — F42.4 EXCORIATION (SKIN-PICKING) DISORDER: ICD-10-CM

## 2018-02-05 DIAGNOSIS — L57.0 ACTINIC KERATOSIS: ICD-10-CM

## 2018-02-05 DIAGNOSIS — L29.8 OTHER PRURITUS: ICD-10-CM

## 2018-02-05 PROBLEM — S20.409A UNSPECIFIED SUPERFICIAL INJURIES OF UNSPECIFIED BACK WALL OF THORAX, INITIAL ENCOUNTER: Status: ACTIVE | Noted: 2018-02-05

## 2018-02-05 PROBLEM — S30.92XA UNSPECIFIED SUPERFICIAL INJURY OF ABDOMINAL WALL, INITIAL ENCOUNTER: Status: ACTIVE | Noted: 2018-02-05

## 2018-02-05 PROBLEM — S70.921A UNSPECIFIED SUPERFICIAL INJURY OF RIGHT THIGH, INITIAL ENCOUNTER: Status: ACTIVE | Noted: 2018-02-05

## 2018-02-05 PROBLEM — L85.3 XEROSIS CUTIS: Status: ACTIVE | Noted: 2018-02-05

## 2018-02-05 PROCEDURE — ? OTHER

## 2018-02-05 PROCEDURE — ? COUNSELING

## 2018-02-05 PROCEDURE — 99214 OFFICE O/P EST MOD 30 MIN: CPT

## 2018-02-05 PROCEDURE — ? PRESCRIPTION

## 2018-02-05 PROCEDURE — ? DIAGNOSIS COMMENT

## 2018-02-05 RX ORDER — TRIAMCINOLONE ACETONIDE 1 MG/G
CREAM TOPICAL BID
Qty: 1 | Refills: 0 | Status: ERX | COMMUNITY
Start: 2018-02-05

## 2018-02-05 RX ADMIN — TRIAMCINOLONE ACETONIDE: 1 CREAM TOPICAL at 12:59

## 2018-02-05 ASSESSMENT — LOCATION SIMPLE DESCRIPTION DERM
LOCATION SIMPLE: LEFT UPPER BACK
LOCATION SIMPLE: RIGHT THIGH
LOCATION SIMPLE: ABDOMEN
LOCATION SIMPLE: LEFT THIGH
LOCATION SIMPLE: LEFT POSTERIOR THIGH
LOCATION SIMPLE: ABDOMEN
LOCATION SIMPLE: LEFT THIGH
LOCATION SIMPLE: RIGHT THIGH
LOCATION SIMPLE: LEFT LOWER BACK

## 2018-02-05 ASSESSMENT — LOCATION DETAILED DESCRIPTION DERM
LOCATION DETAILED: LEFT ANTERIOR DISTAL THIGH
LOCATION DETAILED: LEFT LATERAL ABDOMEN
LOCATION DETAILED: RIGHT LATERAL ABDOMEN
LOCATION DETAILED: EPIGASTRIC SKIN
LOCATION DETAILED: RIGHT ANTERIOR PROXIMAL THIGH
LOCATION DETAILED: LEFT SUPERIOR MEDIAL MIDBACK
LOCATION DETAILED: LEFT INFERIOR UPPER BACK
LOCATION DETAILED: RIGHT LATERAL ABDOMEN
LOCATION DETAILED: RIGHT ANTERIOR PROXIMAL THIGH
LOCATION DETAILED: LEFT DISTAL POSTERIOR THIGH
LOCATION DETAILED: RIGHT ANTERIOR DISTAL THIGH
LOCATION DETAILED: LEFT ANTERIOR PROXIMAL THIGH

## 2018-02-05 ASSESSMENT — LOCATION ZONE DERM
LOCATION ZONE: LEG
LOCATION ZONE: TRUNK
LOCATION ZONE: TRUNK
LOCATION ZONE: LEG

## 2018-02-05 NOTE — PROCEDURE: DIAGNOSIS COMMENT
Comment: Previous treatments include metrology dose pack and permethrin. No improvement. Her  is not affected. Examination shows multiple excoriations without primary lesions. The majority are around the waistline and extremities but she does have several on the neck and right cheek (atypical for scabies). Dermatoscopic exam was without findings for mites (negative delta sign).\\n\\nThe patient does tell me that she has a history of Parkinson’s disease. I wonder if these sensation abnormalities may be a manifestation of this underlying issue. At this point, recommend antihistamines and topical steroids as below. Recommend topical CeraVe anti itch cream.
Detail Level: Simple

## 2018-02-05 NOTE — PROCEDURE: OTHER
Detail Level: Zone
Note Text (......Xxx Chief Complaint.): This diagnosis correlates with the
Other (Free Text): Patient used Efudex, she has had brisk reaction.
Other (Free Text): Patient will take Zyrtec at night for itching.

## 2018-06-04 ENCOUNTER — APPOINTMENT (RX ONLY)
Dept: URBAN - METROPOLITAN AREA CLINIC 23 | Facility: CLINIC | Age: 78
Setting detail: DERMATOLOGY
End: 2018-06-04

## 2018-06-04 DIAGNOSIS — L82.0 INFLAMED SEBORRHEIC KERATOSIS: ICD-10-CM

## 2018-06-04 DIAGNOSIS — L29.89 OTHER PRURITUS: ICD-10-CM | Status: INADEQUATELY CONTROLLED

## 2018-06-04 DIAGNOSIS — L81.4 OTHER MELANIN HYPERPIGMENTATION: ICD-10-CM

## 2018-06-04 DIAGNOSIS — D18.0 HEMANGIOMA: ICD-10-CM

## 2018-06-04 DIAGNOSIS — L82.1 OTHER SEBORRHEIC KERATOSIS: ICD-10-CM

## 2018-06-04 DIAGNOSIS — L29.8 OTHER PRURITUS: ICD-10-CM | Status: INADEQUATELY CONTROLLED

## 2018-06-04 PROBLEM — D18.01 HEMANGIOMA OF SKIN AND SUBCUTANEOUS TISSUE: Status: ACTIVE | Noted: 2018-06-04

## 2018-06-04 PROCEDURE — ? OTHER

## 2018-06-04 PROCEDURE — ? REFERRAL

## 2018-06-04 PROCEDURE — 17110 DESTRUCTION B9 LES UP TO 14: CPT

## 2018-06-04 PROCEDURE — 99214 OFFICE O/P EST MOD 30 MIN: CPT | Mod: 25

## 2018-06-04 PROCEDURE — ? COUNSELING

## 2018-06-04 PROCEDURE — ? LIQUID NITROGEN

## 2018-06-04 ASSESSMENT — LOCATION DETAILED DESCRIPTION DERM
LOCATION DETAILED: RIGHT ANTERIOR PROXIMAL THIGH
LOCATION DETAILED: RIGHT CENTRAL LATERAL NECK
LOCATION DETAILED: RIGHT LATERAL ABDOMEN
LOCATION DETAILED: LEFT SUPERIOR UPPER BACK
LOCATION DETAILED: LEFT POSTERIOR SHOULDER
LOCATION DETAILED: RIGHT SUPERIOR UPPER BACK
LOCATION DETAILED: LEFT SUPERIOR MEDIAL MIDBACK
LOCATION DETAILED: LEFT ANTERIOR DISTAL THIGH
LOCATION DETAILED: RIGHT SUPERIOR MEDIAL UPPER BACK
LOCATION DETAILED: RIGHT LATERAL ABDOMEN

## 2018-06-04 ASSESSMENT — LOCATION ZONE DERM
LOCATION ZONE: ARM
LOCATION ZONE: TRUNK
LOCATION ZONE: TRUNK
LOCATION ZONE: NECK
LOCATION ZONE: LEG

## 2018-06-04 ASSESSMENT — LOCATION SIMPLE DESCRIPTION DERM
LOCATION SIMPLE: ABDOMEN
LOCATION SIMPLE: LEFT LOWER BACK
LOCATION SIMPLE: LEFT THIGH
LOCATION SIMPLE: LEFT UPPER BACK
LOCATION SIMPLE: NECK
LOCATION SIMPLE: RIGHT UPPER BACK
LOCATION SIMPLE: RIGHT THIGH
LOCATION SIMPLE: ABDOMEN
LOCATION SIMPLE: LEFT SHOULDER

## 2018-06-04 NOTE — PROCEDURE: LIQUID NITROGEN
Medical Necessity Clause: This procedure was medically necessary because the lesions that were treated were:
Include Z78.9 (Other Specified Conditions Influencing Health Status) As An Associated Diagnosis?: No
Post-Care Instructions: I reviewed with the patient in detail post-care instructions. Patient is to wear sunprotection, and avoid picking at any of the treated lesions. Pt may apply Vaseline to crusted or scabbing areas.
Consent: The patient's consent was obtained including but not limited to risks of crusting, scabbing, blistering, scarring, darker or lighter pigmentary change, recurrence, incomplete removal and infection.
Detail Level: Detailed
Medical Necessity Information: It is in your best interest to select a reason for this procedure from the list below. All of these items fulfill various CMS LCD requirements except the new and changing color options.

## 2018-06-04 NOTE — PROCEDURE: OTHER
Detail Level: Zone
Other (Free Text): Pts pcp gave premethrin she feels this may have helped.\\n\\nPt has Parkinson’s disease.  She states she can feel “ bugs crawling” on her skin. Discussed this may be a symptom of her disease. \\n\\nDiscussed narrow band light therapy with Dr Correa. 3 times a week for 3 months to see how f this helps itching.\\n\\nPt states her labs have all been normal.  She sees Dr. Browning.
Note Text (......Xxx Chief Complaint.): This diagnosis correlates with the

## 2018-06-10 ENCOUNTER — ANESTHESIA EVENT (OUTPATIENT)
Dept: ENDOSCOPY | Age: 78
End: 2018-06-10
Payer: MEDICARE

## 2018-06-10 RX ORDER — SODIUM CHLORIDE 0.9 % (FLUSH) 0.9 %
5-10 SYRINGE (ML) INJECTION AS NEEDED
Status: CANCELLED | OUTPATIENT
Start: 2018-06-10

## 2018-06-10 RX ORDER — SODIUM CHLORIDE, SODIUM LACTATE, POTASSIUM CHLORIDE, CALCIUM CHLORIDE 600; 310; 30; 20 MG/100ML; MG/100ML; MG/100ML; MG/100ML
25 INJECTION, SOLUTION INTRAVENOUS CONTINUOUS
Status: CANCELLED | OUTPATIENT
Start: 2018-06-10 | End: 2018-06-11

## 2018-06-11 ENCOUNTER — HOSPITAL ENCOUNTER (OUTPATIENT)
Age: 78
Setting detail: OUTPATIENT SURGERY
Discharge: HOME | End: 2018-06-11
Attending: INTERNAL MEDICINE | Admitting: INTERNAL MEDICINE
Payer: MEDICARE

## 2018-06-11 ENCOUNTER — ANESTHESIA (OUTPATIENT)
Dept: ENDOSCOPY | Age: 78
End: 2018-06-11
Payer: MEDICARE

## 2018-06-11 VITALS
TEMPERATURE: 96.8 F | WEIGHT: 98 LBS | BODY MASS INDEX: 18.03 KG/M2 | DIASTOLIC BLOOD PRESSURE: 61 MMHG | HEART RATE: 62 BPM | RESPIRATION RATE: 15 BRPM | HEIGHT: 62 IN | SYSTOLIC BLOOD PRESSURE: 128 MMHG | OXYGEN SATURATION: 100 %

## 2018-06-11 PROCEDURE — 74011000250 HC RX REV CODE- 250

## 2018-06-11 PROCEDURE — 76060000031 HC ANESTHESIA FIRST 0.5 HR: Performed by: INTERNAL MEDICINE

## 2018-06-11 PROCEDURE — 74011250636 HC RX REV CODE- 250/636

## 2018-06-11 PROCEDURE — 74011250636 HC RX REV CODE- 250/636: Performed by: ANESTHESIOLOGY

## 2018-06-11 PROCEDURE — 76040000025: Performed by: INTERNAL MEDICINE

## 2018-06-11 RX ORDER — PROPOFOL 10 MG/ML
INJECTION, EMULSION INTRAVENOUS
Status: DISCONTINUED | OUTPATIENT
Start: 2018-06-11 | End: 2018-06-11 | Stop reason: HOSPADM

## 2018-06-11 RX ORDER — SODIUM CHLORIDE, SODIUM LACTATE, POTASSIUM CHLORIDE, CALCIUM CHLORIDE 600; 310; 30; 20 MG/100ML; MG/100ML; MG/100ML; MG/100ML
100 INJECTION, SOLUTION INTRAVENOUS CONTINUOUS
Status: DISCONTINUED | OUTPATIENT
Start: 2018-06-11 | End: 2018-06-11 | Stop reason: HOSPADM

## 2018-06-11 RX ORDER — PROPOFOL 10 MG/ML
INJECTION, EMULSION INTRAVENOUS AS NEEDED
Status: DISCONTINUED | OUTPATIENT
Start: 2018-06-11 | End: 2018-06-11 | Stop reason: HOSPADM

## 2018-06-11 RX ORDER — LIDOCAINE HYDROCHLORIDE 20 MG/ML
INJECTION, SOLUTION EPIDURAL; INFILTRATION; INTRACAUDAL; PERINEURAL AS NEEDED
Status: DISCONTINUED | OUTPATIENT
Start: 2018-06-11 | End: 2018-06-11 | Stop reason: HOSPADM

## 2018-06-11 RX ADMIN — LIDOCAINE HYDROCHLORIDE 40 MG: 20 INJECTION, SOLUTION EPIDURAL; INFILTRATION; INTRACAUDAL; PERINEURAL at 11:24

## 2018-06-11 RX ADMIN — PROPOFOL 160 MCG/KG/MIN: 10 INJECTION, EMULSION INTRAVENOUS at 11:24

## 2018-06-11 RX ADMIN — SODIUM CHLORIDE, SODIUM LACTATE, POTASSIUM CHLORIDE, AND CALCIUM CHLORIDE 100 ML/HR: 600; 310; 30; 20 INJECTION, SOLUTION INTRAVENOUS at 11:14

## 2018-06-11 RX ADMIN — PROPOFOL 50 MG: 10 INJECTION, EMULSION INTRAVENOUS at 11:24

## 2018-06-11 NOTE — H&P
Gastroenterology Outpatient History and Physical    Patient: Birdie Ventura    Physician: Fili Hart MD    Vital Signs: Temperature 97.8 °F (36.6 °C), height 5' 2\" (1.575 m), weight 44.5 kg (98 lb). Allergies:    Allergies   Allergen Reactions    Aspirin Other (comments)     Painful stomach-- Hx of PUD    Morphine Other (comments)     Hallucinations    Synthroid [Levothyroxine] Hives       Chief Complaint: Anastomotic ulcer    History of Present Illness: 1 cm anastomotic ulcer after Whipple found 12/2017    Justification for Procedure: Evaluation after treatment    History:  Past Medical History:   Diagnosis Date    Arthritis     oa-  knees and elbows    Asthma     daily inhalers & nebulizer daily    Cancer ([facility] [address]     skin- basal cell- removed     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 Lavern Finley Psychiatric disorder     anxiety: PRN meds    PUD (peptic ulcer disease)     Syncope and collapse     2 episodes in 8 months per spouse-- last one 6/20/16    Unspecified adverse effect of anesthesia     delayed awakening---     Weight loss     25 lbs over approx 2 months per spouse      Past Surgical History:   Procedure Laterality Date    COLONOSCOPY N/A 12/28/2017    COLONOSCOPY/ 19 performed by Tripp Patel MD at [address] [place] [address] HX BREAST BIOPSY Left 25 YEARS AGO    HX COLONOSCOPY      HX ENDOSCOPY  08/2016    failed ampullectomy    HX ERCP  06/2016    HX GI  2/2012    bowel obstruction     [facility]  4/2016 [facility]    colonoscopy    HX GI  09/12/2016    Whipple    HX HERNIA REPAIR  2014 at [facility]    mid abd    HX HYSTERECTOMY 2006 [address] HX LAP CHOLECYSTECTOMY  2001    HX ORTHOPAEDIC Right 2008    R FOOT SURG     HEEL SPUR      Social History     Social History    Marital status:      Spouse name: N/A    Number of children: N/A    Years of education: N/A     Social History Main Topics    Smoking status: Never Smoker    Smokeless tobacco: Never Used    Alcohol use No    Drug use: No    Sexual activity: Not on file     Other Topics Concern    Not on file     Social History Narrative      Family History   Problem Relation Age of Onset    Lung Disease Mother     Cancer Father      prostate    Cancer Sister      colon cancer    Cancer Brother        Medications:   Prior to Admission medications    Medication Sig Start Date End Date Taking? Authorizing Provider   carbidopa-levodopa (SINEMET)  mg per tablet 1/2 tab 5 times a day 5/17/18   Florencio Washington MD   carbidopa-levodopa (SINEMET)  mg per tablet 1 Tab six (6) times daily. 4/27/18   Historical Provider   cephALEXin (KEFLEX) 500 mg capsule  2/20/18   Historical Provider   fluticasone (FLONASE) 50 mcg/actuation nasal spray  4/4/18   Historical Provider   OSCIMIN SL 0.125 mg SL tablet  3/30/18   Historical Provider   lindane (KWELL) 1 % shampoo  4/11/18   Historical Provider   permethrin (ACTICIN) 5 % topical cream  3/12/18   Historical Provider   SSD 1 % topical cream  4/3/18   Historical Provider   triamcinolone acetonide (KENALOG) 0.1 % topical cream  2/5/18   Historical Provider   nitrofurantoin, macrocrystal-monohydrate, (MACROBID) 100 mg capsule Take 1 Cap by mouth two (2) times a day. 5/4/18   He Wilkerson MD   hydrOXYzine HCl (ATARAX) 25 mg tablet 1/2 - 1 po tid for itching 5/4/18   He Wilkerson MD   diazePAM (VALIUM) 10 mg tablet Take 1 Tab by mouth two (2) times daily as needed.  Max Daily Amount: 20 mg. 4/25/18   Lila Vanegas MD   ivermectin 0.5 % lotn Apply neck down once and wash off after 10 min 4/13/18   Raquel Richards MD Sabrina   clotrimazole-betamethasone (LOTRISONE) topical cream Apply  to affected area two (2) times a day. 4/13/18   Paige Gloria MD   sucralfate (CARAFATE) 1 gram tablet TAKE 1 TABLET FOUR TIMES A DAY 4/4/18   Paige Gloria MD   cetirizine (ZYRTEC) 10 mg tablet Take  by mouth. Historical Provider   zolpidem (AMBIEN) 10 mg tablet Take 1 Tab by mouth nightly as needed. Max Daily Amount: 10 mg. 1/3/18   Paige Gloria MD   esomeprazole (NEXIUM) 40 mg capsule Take 1 Cap by mouth daily. 1/3/18   Paige Gloria MD   oxyCODONE IR (ROXICODONE) 5 mg immediate release tablet Take 1 Tab by mouth every six (6) hours as needed for Pain. Max Daily Amount: 20 mg. Indications: Pain 1/3/18   Paige Gloria MD   dicyclomine (BENTYL) 20 mg tablet Take 1 Tab by mouth every six (6) hours. 1/3/18   Paige Gloria MD   albuterol (PROVENTIL VENTOLIN) 2.5 mg /3 mL (0.083 %) nebulizer solution USE 3 ML VIA NEBULIZER EVERY 4 HOURS AS NEEDED FOR WHEEZING 8/13/17   Paige Gloria MD   midodrine (PROAMITINE) 5 mg tablet Take 1 Tab by mouth three (3) times daily (with meals). Indications: Symptomatic Orthostatic Hypotension 7/3/17   José Luis Al MD   montelukast (SINGULAIR) 10 mg tablet Take 1 Tab by mouth daily. 6/19/17   Paige Gloria MD   albuterol [facility][note 2]) 90 mcg/actuation inhaler Take 2 Puffs by inhalation every six (6) hours as needed for Wheezing. 6/19/17   Paige Gloria MD   bisacodyl (DULCOLAX) 10 mg suppository Insert 10 mg into rectum daily. 10/4/16   CLEMENTE Rosado   levalbuterol (XOPENEX) 0.63 mg/3 mL nebu 3 mL by Nebulization route every six (6) hours as needed. 10/4/16   CLEMENTE Rosado       Physical Exam:   General: alert, cooperative, no distress   HEENT: Head: Normocephalic, no lesions, without obvious abnormality.    Heart: regular rate and rhythm, S1, S2 normal, no murmur, click, rub or gallop   Lungs: chest clear, no wheezing, rales, normal symmetric air entry   Abdominal: Bowel sounds are normal, liver is not enlarged, spleen is not enlarged   Neurological: Grossly normal   Extremities: extremities normal, atraumatic, no cyanosis or edema     Findings/Diagnosis: Anastomotic ulcer    Plan of Care/Planned Procedure: EGD    Signed By: Kristen Martínez MD     June 11, 2018

## 2018-06-11 NOTE — DISCHARGE INSTRUCTIONS
Gastrointestinal Esophagogastroduodenoscopy (EGD) - Upper Exam Discharge Instructions    1. Call Dr. José Miguel Montelongo at [phone] for any problems or questions. 2. Contact the doctor's office for follow up appointment as directed. 3. Medication may cause drowsiness for several hours, therefore, do not drive or operate machinery for remainder of the day. 4. No alcohol today. 5. Ordinarily, you may resume regular diet and activity after exam unless otherwise specified by your physician. 6. For mild soreness in your throat you may use Cepacol throat lozenges or warm salt-water gargles as needed. Any additional instructions:  PLAN: F/U PRN. Instructions given to [employer] and other family members.   Instructions given by:  Dr. José Miguel Montelongo

## 2018-06-11 NOTE — PROCEDURES
PROCEDURE: Esophagogastroduodenoscopy. ENDOSCOPIST: Avi Drummond M.D. PREOPERATIVE DIAGNOSIS: Gastric ulcer    POSTOPERATIVE DIAGNOSIS: Normal s/p Whipple operation    INSTRUMENTS: [id number]    SEDATION: MAC    DESCRIPTION: After informed consent was obtained, the patient was taken to the endoscopy suite and placed in the left lateral decubitus position. Intravenous sedation was administered as above and posterior pharynx was anesthetized with local anesthetic spray. After adequate sedation had been achieved the endoscope was inserted over the tongue and through the posterior pharynx under direct visualization down the esophagus to the stomach and into the second portion of the duodenum. The endoscopic was withdrawn from that point, performing a careful survey of the mucosa. Retroflexion was performed in the gastric fundus. FINDINGS:  Esophagus: Normal    Stomach: Healthy gastroenteric anastomosis. Otherwise normal.    Duodenum: Normal    Estimated blood loss:  0 cc-minimal    IMPRESSION: Resolution of prior ulcer. Patient feels she still needs medications and diet modification to feel well. PLAN: F/U PRN.       Anette Baumgarten, MD

## 2018-06-11 NOTE — ANESTHESIA POSTPROCEDURE EVALUATION
Post-Anesthesia Evaluation and Assessment    Patient: Ta Nelson MRN: [id number]  SSN: [id number]    YOB: 1940  Age: [de-identified] y.o. Sex: female       Cardiovascular Function/Vital Signs  Visit Vitals    /61    Pulse 62    Temp 36 °C (96.8 °F)    Resp 15    Ht 5' 2\" (1.575 m)    Wt 44.5 kg (98 lb)    SpO2 100%    Breastfeeding No    BMI 17.92 kg/m2       Patient is status post general anesthesia for Procedure(s):  ESOPHAGOGASTRODUODENOSCOPY (EGD) / BMI=18. Nausea/Vomiting: None    Postoperative hydration reviewed and adequate. Pain:  Pain Scale 1: Numeric (0 - 10) (06/11/18 1106)  Pain Intensity 1: 0 (06/11/18 1106)   Managed    Neurological Status: At baseline    Mental Status and Level of Consciousness: Arousable    Pulmonary Status:   O2 Device: Nasal cannula (06/11/18 1136)   Adequate oxygenation and airway patent    Complications related to anesthesia: None    Post-anesthesia assessment completed.  No concerns    Signed By: David Mehta MD     June 11, 2018

## 2018-06-11 NOTE — ROUTINE PROCESS
Patient discharged via wheelchair to home with . Discharge instructions given to family and patient. Understanding expressed.  Vital signs stable at discharge

## 2018-08-21 ENCOUNTER — HOSPITAL ENCOUNTER (OUTPATIENT)
Dept: CT IMAGING | Age: 78
Discharge: HOME | End: 2018-08-21
Attending: SURGERY
Payer: MEDICARE

## 2018-08-21 VITALS — HEIGHT: 61 IN | BODY MASS INDEX: 18.88 KG/M2 | WEIGHT: 100 LBS

## 2018-08-21 DIAGNOSIS — C17.0 DUODENAL CANCER (HCC): ICD-10-CM

## 2018-08-21 LAB — CREAT BLD-MCNC: 0.7 MG/DL (ref 0.8–1.5)

## 2018-08-21 PROCEDURE — 74011000258 HC RX REV CODE- 258: Performed by: SURGERY

## 2018-08-21 PROCEDURE — 74177 CT ABD & PELVIS W/CONTRAST: CPT

## 2018-08-21 PROCEDURE — 82565 ASSAY OF CREATININE: CPT

## 2018-08-21 PROCEDURE — 74011636320 HC RX REV CODE- 636/320: Performed by: SURGERY

## 2018-08-21 RX ORDER — SODIUM CHLORIDE 0.9 % (FLUSH) 0.9 %
10 SYRINGE (ML) INJECTION
Status: COMPLETED | OUTPATIENT
Start: 2018-08-21 | End: 2018-08-21

## 2018-08-21 RX ADMIN — SODIUM CHLORIDE 100 ML: 900 INJECTION, SOLUTION INTRAVENOUS at 13:54

## 2018-08-21 RX ADMIN — Medication 10 ML: at 13:54

## 2018-08-21 RX ADMIN — DIATRIZOATE MEGLUMINE AND DIATRIZOATE SODIUM 15 ML: 660; 100 LIQUID ORAL; RECTAL at 13:54

## 2018-08-21 RX ADMIN — IOPAMIDOL 100 ML: 755 INJECTION, SOLUTION INTRAVENOUS at 13:54

## 2018-08-24 PROBLEM — D13.5 AMPULLARY ADENOMA: Status: ACTIVE | Noted: 2018-08-24

## 2018-08-31 ENCOUNTER — HOSPITAL ENCOUNTER (OUTPATIENT)
Dept: MAMMOGRAPHY | Age: 78
Discharge: HOME | End: 2018-08-31
Attending: FAMILY MEDICINE
Payer: MEDICARE

## 2018-08-31 DIAGNOSIS — Z12.31 ENCOUNTER FOR SCREENING MAMMOGRAM FOR BREAST CANCER: ICD-10-CM

## 2018-08-31 PROCEDURE — 77067 SCR MAMMO BI INCL CAD: CPT

## 2018-09-10 ENCOUNTER — APPOINTMENT (RX ONLY)
Dept: URBAN - METROPOLITAN AREA CLINIC 23 | Facility: CLINIC | Age: 78
Setting detail: DERMATOLOGY
End: 2018-09-10

## 2018-09-10 DIAGNOSIS — L29.8 OTHER PRURITUS: ICD-10-CM | Status: STABLE

## 2018-09-10 DIAGNOSIS — L29.89 OTHER PRURITUS: ICD-10-CM | Status: STABLE

## 2018-09-10 DIAGNOSIS — Z85.828 PERSONAL HISTORY OF OTHER MALIGNANT NEOPLASM OF SKIN: ICD-10-CM

## 2018-09-10 PROBLEM — J30.1 ALLERGIC RHINITIS DUE TO POLLEN: Status: ACTIVE | Noted: 2018-09-10

## 2018-09-10 PROCEDURE — 99213 OFFICE O/P EST LOW 20 MIN: CPT

## 2018-09-10 PROCEDURE — ? COUNSELING

## 2018-09-10 PROCEDURE — ? TREATMENT REGIMEN

## 2018-09-10 PROCEDURE — ? REFERRAL

## 2018-09-10 PROCEDURE — ? ADDITIONAL NOTES

## 2018-09-10 ASSESSMENT — LOCATION SIMPLE DESCRIPTION DERM
LOCATION SIMPLE: CHEST
LOCATION SIMPLE: RIGHT THIGH
LOCATION SIMPLE: LEFT LOWER BACK
LOCATION SIMPLE: LEFT THIGH
LOCATION SIMPLE: ABDOMEN
LOCATION SIMPLE: ABDOMEN

## 2018-09-10 ASSESSMENT — LOCATION DETAILED DESCRIPTION DERM
LOCATION DETAILED: RIGHT LATERAL ABDOMEN
LOCATION DETAILED: RIGHT ANTERIOR PROXIMAL THIGH
LOCATION DETAILED: RIGHT LATERAL ABDOMEN
LOCATION DETAILED: RIGHT LATERAL SUPERIOR CHEST
LOCATION DETAILED: LEFT SUPERIOR MEDIAL MIDBACK
LOCATION DETAILED: LEFT ANTERIOR DISTAL THIGH

## 2018-09-10 ASSESSMENT — LOCATION ZONE DERM
LOCATION ZONE: TRUNK
LOCATION ZONE: LEG
LOCATION ZONE: TRUNK

## 2018-09-10 NOTE — PROCEDURE: TREATMENT REGIMEN
Plan: During winter limit soap to groin and axilla. Keep skin moisturized.
Otc Regimen: CeraVe anti-itch
Detail Level: Zone

## 2018-09-10 NOTE — PROCEDURE: ADDITIONAL NOTES
Additional Notes: Advised patient to avoid wool clothing.
Detail Level: Simple
Additional Notes: Information provided by patient

## 2018-10-29 ENCOUNTER — HOSPITAL ENCOUNTER (OUTPATIENT)
Dept: CT IMAGING | Age: 78
Discharge: HOME | End: 2018-10-29
Attending: FAMILY MEDICINE
Payer: MEDICARE

## 2018-10-29 DIAGNOSIS — R07.81 PLEURITIC CHEST PAIN: ICD-10-CM

## 2018-10-29 DIAGNOSIS — C17.0 DUODENAL CANCER (HCC): ICD-10-CM

## 2018-10-29 PROCEDURE — 71250 CT THORAX DX C-: CPT

## 2019-01-09 ENCOUNTER — HOSPITAL ENCOUNTER (OUTPATIENT)
Dept: MRI IMAGING | Age: 79
Discharge: HOME | End: 2019-01-09
Attending: PSYCHIATRY & NEUROLOGY
Payer: MEDICARE

## 2019-01-09 DIAGNOSIS — R20.2 PARESTHESIA OF BILATERAL LEGS: ICD-10-CM

## 2019-01-09 DIAGNOSIS — R20.2 PARESTHESIA AND PAIN OF BOTH UPPER EXTREMITIES: ICD-10-CM

## 2019-01-09 DIAGNOSIS — M79.601 PARESTHESIA AND PAIN OF BOTH UPPER EXTREMITIES: ICD-10-CM

## 2019-01-09 DIAGNOSIS — M62.89 ABNORMAL INCREASED MUSCLE TONE: ICD-10-CM

## 2019-01-09 DIAGNOSIS — M79.602 PARESTHESIA AND PAIN OF BOTH UPPER EXTREMITIES: ICD-10-CM

## 2019-01-09 PROCEDURE — 72141 MRI NECK SPINE W/O DYE: CPT

## 2019-03-24 PROBLEM — M48.02 CERVICAL STENOSIS OF SPINE: Status: ACTIVE | Noted: 2019-03-24

## 2019-04-01 ENCOUNTER — HOSPITAL ENCOUNTER (OUTPATIENT)
Dept: CT IMAGING | Age: 79
Discharge: HOME | End: 2019-04-01
Payer: MEDICARE

## 2019-04-01 VITALS — HEIGHT: 62 IN | BODY MASS INDEX: 17.48 KG/M2 | WEIGHT: 95 LBS

## 2019-04-01 DIAGNOSIS — R10.84 ABDOMINAL PAIN, GENERALIZED: ICD-10-CM

## 2019-04-01 DIAGNOSIS — R63.4 LOSS OF WEIGHT: ICD-10-CM

## 2019-04-01 PROCEDURE — 74011000258 HC RX REV CODE- 258: Performed by: NURSE PRACTITIONER

## 2019-04-01 PROCEDURE — 74160 CT ABDOMEN W/CONTRAST: CPT

## 2019-04-01 PROCEDURE — 74011636320 HC RX REV CODE- 636/320: Performed by: NURSE PRACTITIONER

## 2019-04-01 RX ORDER — SODIUM CHLORIDE 0.9 % (FLUSH) 0.9 %
10 SYRINGE (ML) INJECTION
Status: COMPLETED | OUTPATIENT
Start: 2019-04-01 | End: 2019-04-01

## 2019-04-01 RX ADMIN — Medication 10 ML: at 10:07

## 2019-04-01 RX ADMIN — SODIUM CHLORIDE 100 ML: 9 INJECTION, SOLUTION INTRAVENOUS at 10:07

## 2019-04-01 RX ADMIN — IOPAMIDOL 95 ML: 755 INJECTION, SOLUTION INTRAVENOUS at 10:07

## 2019-07-12 PROBLEM — C17.0 DUODENAL CANCER (HCC): Status: RESOLVED | Noted: 2017-02-27 | Resolved: 2019-07-12

## 2019-08-19 PROBLEM — F22 EKBOM'S DELUSIONAL PARASITOSIS (HCC): Status: ACTIVE | Noted: 2019-08-19

## 2019-09-11 ENCOUNTER — APPOINTMENT (OUTPATIENT)
Dept: CT IMAGING | Age: 79
End: 2019-09-11
Attending: EMERGENCY MEDICINE
Payer: MEDICARE

## 2019-09-11 ENCOUNTER — HOSPITAL ENCOUNTER (EMERGENCY)
Age: 79
Discharge: HOME | End: 2019-09-11
Attending: EMERGENCY MEDICINE
Payer: MEDICARE

## 2019-09-11 ENCOUNTER — APPOINTMENT (OUTPATIENT)
Dept: GENERAL RADIOLOGY | Age: 79
End: 2019-09-11
Attending: EMERGENCY MEDICINE
Payer: MEDICARE

## 2019-09-11 VITALS
HEIGHT: 61 IN | TEMPERATURE: 98 F | RESPIRATION RATE: 20 BRPM | BODY MASS INDEX: 17.75 KG/M2 | DIASTOLIC BLOOD PRESSURE: 59 MMHG | HEART RATE: 78 BPM | SYSTOLIC BLOOD PRESSURE: 134 MMHG | WEIGHT: 94 LBS | OXYGEN SATURATION: 98 %

## 2019-09-11 DIAGNOSIS — R11.2 NAUSEA AND VOMITING, INTRACTABILITY OF VOMITING NOT SPECIFIED, UNSPECIFIED VOMITING TYPE: Primary | ICD-10-CM

## 2019-09-11 LAB
ALBUMIN SERPL-MCNC: 3.8 G/DL (ref 3.2–4.6)
ALBUMIN/GLOB SERPL: 1.3 {RATIO} (ref 1.2–3.5)
ALP SERPL-CCNC: 101 U/L (ref 50–136)
ALT SERPL-CCNC: 7 U/L (ref 12–65)
ANION GAP SERPL CALC-SCNC: 10 MMOL/L (ref 7–16)
AST SERPL-CCNC: 22 U/L (ref 15–37)
BASOPHILS # BLD: 0.1 K/UL (ref 0–0.2)
BASOPHILS NFR BLD: 1 % (ref 0–2)
BILIRUB SERPL-MCNC: 0.6 MG/DL (ref 0.2–1.1)
BUN SERPL-MCNC: 8 MG/DL (ref 8–23)
CALCIUM SERPL-MCNC: 9.2 MG/DL (ref 8.3–10.4)
CHLORIDE SERPL-SCNC: 104 MMOL/L (ref 98–107)
CO2 SERPL-SCNC: 27 MMOL/L (ref 21–32)
CREAT SERPL-MCNC: 0.83 MG/DL (ref 0.6–1)
DIFFERENTIAL METHOD BLD: ABNORMAL
EOSINOPHIL # BLD: 0.1 K/UL (ref 0–0.8)
EOSINOPHIL NFR BLD: 1 % (ref 0.5–7.8)
ERYTHROCYTE [DISTWIDTH] IN BLOOD BY AUTOMATED COUNT: 14.4 % (ref 11.9–14.6)
GLOBULIN SER CALC-MCNC: 3 G/DL (ref 2.3–3.5)
GLUCOSE SERPL-MCNC: 104 MG/DL (ref 65–100)
HCT VFR BLD AUTO: 35.4 % (ref 35.8–46.3)
HGB BLD-MCNC: 11.5 G/DL (ref 11.7–15.4)
IMM GRANULOCYTES # BLD AUTO: 0 K/UL (ref 0–0.5)
IMM GRANULOCYTES NFR BLD AUTO: 0 % (ref 0–5)
LYMPHOCYTES # BLD: 1.5 K/UL (ref 0.5–4.6)
LYMPHOCYTES NFR BLD: 23 % (ref 13–44)
MCH RBC QN AUTO: 29.7 PG (ref 26.1–32.9)
MCHC RBC AUTO-ENTMCNC: 32.5 G/DL (ref 31.4–35)
MCV RBC AUTO: 91.5 FL (ref 79.6–97.8)
MONOCYTES # BLD: 0.6 K/UL (ref 0.1–1.3)
MONOCYTES NFR BLD: 8 % (ref 4–12)
NEUTS SEG # BLD: 4.4 K/UL (ref 1.7–8.2)
NEUTS SEG NFR BLD: 66 % (ref 43–78)
NRBC # BLD: 0 K/UL (ref 0–0.2)
PLATELET # BLD AUTO: 180 K/UL (ref 150–450)
PMV BLD AUTO: 12 FL (ref 9.4–12.3)
POTASSIUM SERPL-SCNC: 3.8 MMOL/L (ref 3.5–5.1)
PROT SERPL-MCNC: 6.8 G/DL (ref 6.3–8.2)
RBC # BLD AUTO: 3.87 M/UL (ref 4.05–5.2)
SODIUM SERPL-SCNC: 141 MMOL/L (ref 136–145)
WBC # BLD AUTO: 6.6 K/UL (ref 4.3–11.1)

## 2019-09-11 PROCEDURE — 74011250636 HC RX REV CODE- 250/636: Performed by: EMERGENCY MEDICINE

## 2019-09-11 PROCEDURE — 93005 ELECTROCARDIOGRAM TRACING: CPT | Performed by: EMERGENCY MEDICINE

## 2019-09-11 PROCEDURE — 99284 EMERGENCY DEPT VISIT MOD MDM: CPT | Performed by: EMERGENCY MEDICINE

## 2019-09-11 PROCEDURE — 96361 HYDRATE IV INFUSION ADD-ON: CPT | Performed by: EMERGENCY MEDICINE

## 2019-09-11 PROCEDURE — 80053 COMPREHEN METABOLIC PANEL: CPT

## 2019-09-11 PROCEDURE — 70450 CT HEAD/BRAIN W/O DYE: CPT

## 2019-09-11 PROCEDURE — 85025 COMPLETE CBC W/AUTO DIFF WBC: CPT

## 2019-09-11 PROCEDURE — 71045 X-RAY EXAM CHEST 1 VIEW: CPT

## 2019-09-11 PROCEDURE — 96374 THER/PROPH/DIAG INJ IV PUSH: CPT | Performed by: EMERGENCY MEDICINE

## 2019-09-11 RX ORDER — LORAZEPAM 2 MG/ML
0.5 INJECTION INTRAMUSCULAR
Status: COMPLETED | OUTPATIENT
Start: 2019-09-11 | End: 2019-09-11

## 2019-09-11 RX ORDER — ONDANSETRON 4 MG/1
4 TABLET, ORALLY DISINTEGRATING ORAL
Qty: 12 TAB | Refills: 0 | Status: SHIPPED | OUTPATIENT
Start: 2019-09-11 | End: 2019-12-16

## 2019-09-11 RX ADMIN — SODIUM CHLORIDE 1000 ML: 900 INJECTION, SOLUTION INTRAVENOUS at 21:13

## 2019-09-11 RX ADMIN — LORAZEPAM 0.5 MG: 2 INJECTION INTRAMUSCULAR; INTRAVENOUS at 21:14

## 2019-09-11 NOTE — ED TRIAGE NOTES
Pt arrives to triage in wheelchair with . Pt reports she can't swallow, denies vomiting. No major swelling noted in oral cavity at this time.  reports this began 4-5 hours ago. Pt also reports shortness of breath, denies chest pain. Pt reports discomfort is in her neck but denies pain. Pt reports a cough without production and usine an albuterol nebulizer treatment yesterday.

## 2019-09-11 NOTE — ED NOTES
Pt wheeled to A bed in wheelchair at this time by paramedic Chalino Sneed. Pt to receive EKG in A bed, IV and blood work have been obtained and blood has been sent to lab.

## 2019-09-12 LAB
ATRIAL RATE: 84 BPM
CALCULATED P AXIS, ECG09: 85 DEGREES
CALCULATED R AXIS, ECG10: 69 DEGREES
CALCULATED T AXIS, ECG11: -37 DEGREES
DIAGNOSIS, 93000: NORMAL
P-R INTERVAL, ECG05: 146 MS
Q-T INTERVAL, ECG07: 342 MS
QRS DURATION, ECG06: 70 MS
QTC CALCULATION (BEZET), ECG08: 404 MS
VENTRICULAR RATE, ECG03: 84 BPM

## 2019-09-12 NOTE — ED NOTES
I have reviewed discharge instructions with the patient. The patient verbalized understanding. Patient left ED via Discharge Method: wheelchair to Home with daughter. Opportunity for questions and clarification provided. Patient given 1 scripts. To continue your aftercare when you leave the hospital, you may receive an automated call from our care team to check in on how you are doing. This is a free service and part of our promise to provide the best care and service to meet your aftercare needs.  If you have questions, or wish to unsubscribe from this service please call [phone]. Thank you for Choosing our [facility] Emergency Department.

## 2019-09-12 NOTE — ED PROVIDER NOTES
78-year-old female presenting for complaints of difficulty swallowing. Has been and she reports that she developed some fullness in her throat earlier today. She then felt somewhat short of breath for short time and felt like she was going to pass out. She reports this is happened to her once before and resolve spontaneously. She feels like she is feeling somewhat better now although she still having difficulty swallowing. She does have a history of Parkinson's disease and takes Sinemet for this. This the only medication she is taken today. She feels like she is having trouble making her throat to perform the actions of swallowing. She also reports that her mouth is very dry. The history is provided by the patient and the spouse. Dysphagia    This is a recurrent problem. The current episode started 3 to 5 hours ago. The problem has been gradually improving. There has been no fever. Associated symptoms include shortness of breath and trouble swallowing.    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 Roel Pryor Psychiatric disorder     anxiety: PRN meds    PUD (peptic ulcer disease)     Syncope and collapse     2 episodes in 8 months per spouse-- last one 6/20/16    Unspecified adverse effect of anesthesia     delayed awakening---     Weight loss     25 lbs over approx 2 months per spouse       Past Surgical History:   Procedure Laterality Date    COLONOSCOPY N/A 12/28/2017    COLONOSCOPY/ 19 performed by Jung Martin MD at [address] HX BREAST BIOPSY Left 25 YEARS AGO    HX COLONOSCOPY      HX ENDOSCOPY  08/2016    failed ampullectomy    HX ERCP  06/2016    HX GI  2/2012    bowel obstruction     HX GI  4/2016 at [facility]    colonoscopy    HX GI  09/12/2016    Whipple    HX HERNIA REPAIR  2014 at [facility]    mid abd    HX [place]    HX LAP CHOLECYSTECTOMY  2001    HX ORTHOPAEDIC Right 2008    R FOOT SURG     HEEL SPUR         Family History:   Problem Relation Age of Onset    Lung Disease Mother     Cancer Father         prostate    Cancer Sister         colon cancer    Cancer Brother        Social History     Socioeconomic History    Marital status:      Spouse name: Not on file    Number of children: Not on file    Years of education: Not on file    Highest education level: Not on file   Occupational History    Not on file   Social Needs    Financial resource strain: Not on file    Food insecurity:     Worry: Not on file     Inability: Not on file    Transportation needs:     Medical: Not on file     Non-medical: Not on file   Tobacco Use    Smoking status: Never Smoker    Smokeless tobacco: Never Used   Substance and Sexual Activity    Alcohol use: No     Alcohol/week: 0.0 standard drinks    Drug use: No    Sexual activity: Not on file   Lifestyle    Physical activity:     Days per week: Not on file     Minutes per session: Not on file    Stress: Not on file   Relationships    Social connections:     Talks on phone: Not on file     Gets together: Not on file     Attends Anabaptism service: Not on file     Active member of club or organization: Not on file     Attends meetings of clubs or organizations: Not on file     Relationship status: Not on file    Intimate partner violence:     Fear of current or ex partner: Not on file     Emotionally abused: Not on file     Physically abused: Not on file     Forced sexual activity: Not on file   Other Topics Concern    Not on file   Social History Narrative    Not on file         ALLERGIES: Aspirin; Lamisil [terbinafine hcl]; Morphine; and Synthroid [levothyroxine]    Review of Systems   HENT: Positive for trouble swallowing. Respiratory: Positive for shortness of breath. All other systems reviewed and are negative. Vitals:    09/11/19 1951 09/11/19 2014   BP: 119/62 143/61   Pulse: 88 84   Resp: 22 17   Temp: 97.7 °F (36.5 °C)    SpO2: 98% 99%   Weight: 42.6 kg (94 lb)    Height: 5' 1\" (1.549 m)             Physical Exam   Constitutional: She is oriented to person, place, and time. She appears well-developed and well-nourished. HENT:   Head: Normocephalic and atraumatic. Posterior oropharynx is clear, there is no stridor, soft palate raises when she swallows, controlling secretions, dry tongue   Eyes: Pupils are equal, round, and reactive to light. Conjunctivae are normal.   Neck: Neck supple. Cardiovascular: Normal rate and regular rhythm. Pulmonary/Chest: Effort normal and breath sounds normal.   Abdominal: Soft. Bowel sounds are normal.   Musculoskeletal: Normal range of motion. Neurological: She is alert and oriented to person, place, and time. Patient speaks in a very hushed voice, avoids eye contact   Skin: Skin is warm and dry. Nursing note and vitals reviewed. MDM  Number of Diagnoses or Management Options  Nausea and vomiting, intractability of vomiting not specified, unspecified vomiting type:   Diagnosis management comments: 49-year-old female presenting for difficulty swallowing. Concern for throat mass, angioedema, CVA, symptoms secondary to parkinsonism    On exam the patient is having no airway issues and she is actually controlling her secretions quite well.   Her  takes me to the side and reports that due to her Parkinson's she often has delusions, most specifically regarding infestation with insects, although this does not seem to be the case today. It is possible though the patient has become fixated on the sensation. We will get a head CT, chest x-ray, basic labs and reevaluate. Amount and/or Complexity of Data Reviewed  Clinical lab tests: ordered and reviewed (Results for orders placed or performed during the hospital encounter of 09/11/19  -CBC WITH AUTOMATED DIFF       Result                      Value             Ref Range           WBC                         6.6               4.3 - 11.1 K*       RBC                         3.87 (L)          4.05 - 5.2 M*       HGB                         11.5 (L)          11.7 - 15.4 *       HCT                         35.4 (L)          35.8 - 46.3 %       MCV                         91.5              79.6 - 97.8 *       MCH                         29.7              26.1 - 32.9 *       MCHC                        32.5              31.4 - 35.0 *       RDW                         14.4              11.9 - 14.6 %       PLATELET                    180               150 - 450 K/*       MPV                         12.0              9.4 - 12.3 FL       ABSOLUTE NRBC               0.00              0.0 - 0.2 K/*       DF                          AUTOMATED                             NEUTROPHILS                 66                43 - 78 %           LYMPHOCYTES                 23                13 - 44 %           MONOCYTES                   8                 4.0 - 12.0 %        EOSINOPHILS                 1                 0.5 - 7.8 %         BASOPHILS                   1                 0.0 - 2.0 %         IMMATURE GRANULOCYTES       0                 0.0 - 5.0 %         ABS. NEUTROPHILS            4.4               1.7 - 8.2 K/*       ABS. LYMPHOCYTES            1.5               0.5 - 4.6 K/*       ABS. MONOCYTES              0.6               0.1 - 1.3 K/*       ABS.  EOSINOPHILS            0.1               0.0 - 0.8 K/* ABS. BASOPHILS              0.1               0.0 - 0.2 K/*       ABS. IMM. GRANS.            0.0               0.0 - 0.5 K/*  -METABOLIC PANEL, COMPREHENSIVE       Result                      Value             Ref Range           Sodium                      141               136 - 145 mm*       Potassium                   3.8               3.5 - 5.1 mm*       Chloride                    104               98 - 107 mmo*       CO2                         27                21 - 32 mmol*       Anion gap                   10                7 - 16 mmol/L       Glucose                     104 (H)           65 - 100 mg/*       BUN                         8                 8 - 23 MG/DL        Creatinine                  0.83              0.6 - 1.0 MG*       GFR est AA                  >60               >60 ml/min/1*       GFR est non-AA              >60               >60 ml/min/1*       Calcium                     9.2               8.3 - 10.4 M*       Bilirubin, total            0.6               0.2 - 1.1 MG*       ALT (SGPT)                  7 (L)             12 - 65 U/L         AST (SGOT)                  22                15 - 37 U/L         Alk.  phosphatase            101               50 - 136 U/L        Protein, total              6.8               6.3 - 8.2 g/*       Albumin                     3.8               3.2 - 4.6 g/*       Globulin                    3.0               2.3 - 3.5 g/*       A-G Ratio                   1.3               1.2 - 3.5      -EKG, 12 LEAD, INITIAL       Result                      Value             Ref Range           Ventricular Rate            84                BPM                 Atrial Rate                 84                BPM                 P-R Interval                146               ms                  QRS Duration                70                ms                  Q-T Interval                342               ms                  QTC Calculation (Ilda)     404 ms                  Calculated P Axis           85                degrees             Calculated R Axis           69                degrees             Calculated T Axis           -37               degrees             Diagnosis                                                     !! AGE AND GENDER SPECIFIC ECG ANALYSIS !! Normal sinus rhythm   Abnormal QRS-T angle, consider primary T wave abnormality   Abnormal ECG   When compared with ECG of 15-SEP-2016 09:49,   Premature atrial complexes are no longer Present   Non-specific change in ST segment in Inferior leads     )  Tests in the radiology section of CPT®: ordered and reviewed (Ct Head Wo Cont    Result Date: 9/11/2019  NONCONTRAST HEAD CT CLINICAL HISTORY:  Difficulty swallowing for 4-5 hours. TECHNIQUE:  Axial images were obtained with spiral technique. Radiation dose reduction was achieved using one or all of the following techniques: automated exposure control, weight-based dosing, iterative reconstruction. COMPARISON:  MRI of April 4, 2017. REPORT:   Standard non contrast head CT demonstrates no definite intracranial mass effect, hemorrhage, or evidence of acute geographic infarction. White matter hypodensities are most consistent with small vessel ischemic disease. The ventricles are normal in size and configuration, accounting for the patient's age. Orbits and  paranasal sinuses are clear where imaged. Bone windows demonstrate no definite fracture or destruction. IMPRESSION:     SMALL VESSEL ISCHEMIC DISEASE WITH NO ACUTE INTRACRANIAL ABNORMALITY IDENTIFIED AT NONCONTRAST CT. Xr Chest Port    Result Date: 9/11/2019  PORTABLE CHEST, September 11, 2019 at 2004 hours CLINICAL HISTORY:  Shortness of breath with cough. COMPARISON:  September 28, 2016. FINDINGS:  AP erect image demonstrates no confluent infiltrate or significant pleural fluid. The heart size is within normal limits without evidence of congestive heart failure or pneumothorax.   The bony thorax appears intact on this view. There are overlying radiopaque support devices. IMPRESSION:  NO ACUTE CARDIOPULMONARY DISEASE IDENTIFIED.    )  Tests in the medicine section of CPT®: reviewed and ordered  Independent visualization of images, tracings, or specimens: yes (Personally reviewed the patient's imaging)    Risk of Complications, Morbidity, and/or Mortality  Presenting problems: moderate  Diagnostic procedures: high  Management options: moderate  General comments: Symptoms seem to have resolved  She reports that she has had this in the past and it resolved after that as well  Is unclear exactly what could have caused this. It is possible that it was an anxiety response. It is possible that it is a complication of parkinsonism. I doubt that it was a TIA as the patient actually functionally could swallow she just will thought she could not. Discharging the patient with Zofran and instructions to follow-up with her care team.    I personally reviewed the patient's vital signs, laboratory tests, and/or radiological findings. I discussed these findings with the patient and their significance. I answered all questions and gave the patient clear return precautions. The patient was discharged from the emergency department in stable condition        Patient Progress  Patient progress: improved    ED Course as of Sep 11 2237   Wed Sep 11, 2019   2232 Patient has passed her p.o. challenge with both liquids and solids. States she feels much better and is wishing to go home.     [JS]      ED Course User Index  [JS] Tito Mabry MD       Procedures

## 2019-09-12 NOTE — DISCHARGE INSTRUCTIONS
Your symptoms seem to have resolved.   It is possible that this is simply a complication of your parkinsonism  I am providing a nausea medication for future use  Follow-up with your primary care doctor for reevaluation and further discussion of the symptoms

## 2019-09-30 PROBLEM — R13.19 OTHER DYSPHAGIA: Status: ACTIVE | Noted: 2019-09-30

## 2019-10-21 ENCOUNTER — HOSPITAL ENCOUNTER (EMERGENCY)
Age: 79
Discharge: HOME | End: 2019-10-21
Attending: EMERGENCY MEDICINE
Payer: MEDICARE

## 2019-10-21 VITALS
OXYGEN SATURATION: 98 % | DIASTOLIC BLOOD PRESSURE: 62 MMHG | TEMPERATURE: 98.1 F | SYSTOLIC BLOOD PRESSURE: 132 MMHG | HEART RATE: 89 BPM | WEIGHT: 95 LBS | HEIGHT: 61 IN | BODY MASS INDEX: 17.94 KG/M2 | RESPIRATION RATE: 18 BRPM

## 2019-10-21 DIAGNOSIS — F22 EKBOM'S DELUSIONAL PARASITOSIS (HCC): Primary | ICD-10-CM

## 2019-10-21 LAB
ALBUMIN SERPL-MCNC: 3.4 G/DL (ref 3.2–4.6)
ALBUMIN/GLOB SERPL: 1.1 {RATIO} (ref 1.2–3.5)
ALP SERPL-CCNC: 156 U/L (ref 50–136)
ALT SERPL-CCNC: 60 U/L (ref 12–65)
ANION GAP SERPL CALC-SCNC: 5 MMOL/L (ref 7–16)
AST SERPL-CCNC: 70 U/L (ref 15–37)
BASOPHILS # BLD: 0.1 K/UL (ref 0–0.2)
BASOPHILS NFR BLD: 1 % (ref 0–2)
BILIRUB SERPL-MCNC: 0.4 MG/DL (ref 0.2–1.1)
BUN SERPL-MCNC: 10 MG/DL (ref 8–23)
CALCIUM SERPL-MCNC: 8.9 MG/DL (ref 8.3–10.4)
CHLORIDE SERPL-SCNC: 109 MMOL/L (ref 98–107)
CO2 SERPL-SCNC: 31 MMOL/L (ref 21–32)
CREAT SERPL-MCNC: 0.88 MG/DL (ref 0.6–1)
DIFFERENTIAL METHOD BLD: ABNORMAL
EOSINOPHIL # BLD: 0.1 K/UL (ref 0–0.8)
EOSINOPHIL NFR BLD: 2 % (ref 0.5–7.8)
ERYTHROCYTE [DISTWIDTH] IN BLOOD BY AUTOMATED COUNT: 14.9 % (ref 11.9–14.6)
GLOBULIN SER CALC-MCNC: 3.2 G/DL (ref 2.3–3.5)
GLUCOSE SERPL-MCNC: 93 MG/DL (ref 65–100)
HCT VFR BLD AUTO: 37.6 % (ref 35.8–46.3)
HGB BLD-MCNC: 12 G/DL (ref 11.7–15.4)
IMM GRANULOCYTES # BLD AUTO: 0 K/UL (ref 0–0.5)
IMM GRANULOCYTES NFR BLD AUTO: 0 % (ref 0–5)
LYMPHOCYTES # BLD: 1.7 K/UL (ref 0.5–4.6)
LYMPHOCYTES NFR BLD: 31 % (ref 13–44)
MCH RBC QN AUTO: 29.7 PG (ref 26.1–32.9)
MCHC RBC AUTO-ENTMCNC: 31.9 G/DL (ref 31.4–35)
MCV RBC AUTO: 93.1 FL (ref 79.6–97.8)
MONOCYTES # BLD: 0.5 K/UL (ref 0.1–1.3)
MONOCYTES NFR BLD: 9 % (ref 4–12)
NEUTS SEG # BLD: 3.2 K/UL (ref 1.7–8.2)
NEUTS SEG NFR BLD: 57 % (ref 43–78)
NRBC # BLD: 0 K/UL (ref 0–0.2)
PLATELET # BLD AUTO: 189 K/UL (ref 150–450)
PMV BLD AUTO: 13.3 FL (ref 9.4–12.3)
POTASSIUM SERPL-SCNC: 3.8 MMOL/L (ref 3.5–5.1)
PROT SERPL-MCNC: 6.6 G/DL (ref 6.3–8.2)
RBC # BLD AUTO: 4.04 M/UL (ref 4.05–5.2)
SODIUM SERPL-SCNC: 145 MMOL/L (ref 136–145)
WBC # BLD AUTO: 5.5 K/UL (ref 4.3–11.1)

## 2019-10-21 PROCEDURE — 81003 URINALYSIS AUTO W/O SCOPE: CPT | Performed by: EMERGENCY MEDICINE

## 2019-10-21 PROCEDURE — 99284 EMERGENCY DEPT VISIT MOD MDM: CPT | Performed by: EMERGENCY MEDICINE

## 2019-10-21 PROCEDURE — 85025 COMPLETE CBC W/AUTO DIFF WBC: CPT

## 2019-10-21 PROCEDURE — 80053 COMPREHEN METABOLIC PANEL: CPT

## 2019-10-22 NOTE — ED TRIAGE NOTES
Reports congestion to right nare r/t \"bugs\" . States diagnosed with \"mites\" approx 6-7 months ago by dr Sudha Ferreira dermatology and has been going to \"kind of like a tanning bed\" twice a week. Reports has received celexa for problem of which has been stopped by pmd. Also prescribed atarax for itching. Reports \"bugs\" under tongue, unable to visualize in triage. Denies itching on arrival. Arrives with ziploc of which states are \"bugs\" appears to be scabs of which pt states pulled from scalp and ear. Also reports 1 episode of \"black\" stool today. Denies abdominal pain or n/v. Denies blood thinners.

## 2019-10-22 NOTE — ED NOTES
I have reviewed discharge instructions with the patient. The patient verbalized understanding. Patient left ED via Discharge Method: ambulatory to Home with family/friend. Opportunity for questions and clarification provided. Patient given 0 scripts. To continue your aftercare when you leave the hospital, you may receive an automated call from our care team to check in on how you are doing. This is a free service and part of our promise to provide the best care and service to meet your aftercare needs.  If you have questions, or wish to unsubscribe from this service please call [phone]. Thank you for Choosing our [facility] Emergency Department.

## 2019-10-22 NOTE — ED PROVIDER NOTES
Kylie Rodriguez is a 78 y.o. female who presents to the ED with a chief complaint of sensations of bugs crawling all over her body and getting into her nose. She has seen multiple specialists for this including dermatology and her PCP and neurologist.  He does suffer from Parkinson's and talking to her daughter separately it sounds like she has been told multiple times these are delusions and not actual bugs or mites. Patient does not accept this and has had continued frustration from symptoms. She has collected samples of this and brought them with her however it appears to be dead skin tissue. One dark stool earlier none since no blood.            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 Elenita Power Psychiatric disorder     anxiety: PRN meds    PUD (peptic ulcer disease)     Syncope and collapse     2 episodes in 8 months per spouse-- last one 6/20/16    Unspecified adverse effect of anesthesia     delayed awakening---     Weight loss     25 lbs over approx 2 months per spouse       Past Surgical History:   Procedure Laterality Date    COLONOSCOPY N/A 12/28/2017    COLONOSCOPY/ 19 performed by eRné Maza MD at [place] [address] HX BREAST BIOPSY Left 25 YEARS AGO    HX COLONOSCOPY      HX ENDOSCOPY  08/2016    failed ampullectomy    HX ERCP  06/2016    HX GI  2/2012    bowel obstruction     [facility]  4/[facility]    colonoscopy    HX GI  09/12/2016 Whipple    [facility]PAIR  2014 at [facility]    HX [address] HX LAP CHOLECYSTECTOMY  2001    HX ORTHOPAEDIC Right 2008    R FOOT SURG     HEEL SPUR         Family History:   Problem Relation Age of Onset    Lung Disease Mother     Cancer Father         prostate    Cancer Sister         colon cancer    Cancer Brother        Social History     Socioeconomic History    Marital status:      Spouse name: Not on file    Number of children: Not on file    Years of education: Not on file    Highest education level: Not on file   Occupational History    Not on file   Social Needs    Financial resource strain: Not on file    Food insecurity:     Worry: Not on file     Inability: Not on file    Transportation needs:     Medical: Not on file     Non-medical: Not on file   Tobacco Use    Smoking status: Never Smoker    Smokeless tobacco: Never Used   Substance and Sexual Activity    Alcohol use: No     Alcohol/week: 0.0 standard drinks    Drug use: No    Sexual activity: Not on file   Lifestyle    Physical activity:     Days per week: Not on file     Minutes per session: Not on file    Stress: Not on file   Relationships    Social connections:     Talks on phone: Not on file     Gets together: Not on file     Attends Tenriism service: Not on file     Active member of club or organization: Not on file     Attends meetings of clubs or organizations: Not on file     Relationship status: Not on file    Intimate partner violence:     Fear of current or ex partner: Not on file     Emotionally abused: Not on file     Physically abused: Not on file     Forced sexual activity: Not on file   Other Topics Concern    Not on file   Social History Narrative    Not on file         ALLERGIES: Aspirin; Lamisil [terbinafine hcl]; Morphine; and Synthroid [levothyroxine]    Review of Systems   Constitutional: Negative.   Negative for chills, diaphoresis, fatigue and fever.   HENT: Negative for congestion. Respiratory: Negative for apnea, cough, chest tightness, shortness of breath, wheezing and stridor. Cardiovascular: Negative for chest pain and palpitations. Gastrointestinal: Negative for abdominal pain, anal bleeding, blood in stool, diarrhea, nausea and vomiting. Musculoskeletal: Negative for arthralgias, neck pain and neck stiffness. Skin: Negative. Negative for color change, pallor, rash and wound. All other systems reviewed and are negative. Vitals:    10/21/19 2040   BP: 140/65   Pulse: 85   Resp: 18   Temp: 98.8 °F (37.1 °C)   SpO2: 100%   Weight: 43.1 kg (95 lb)   Height: 5' 1\" (1.549 m)            Physical Exam   Constitutional: She is oriented to person, place, and time. She appears well-developed and well-nourished. No distress. HENT:   Head: Normocephalic and atraumatic. Right Ear: External ear normal.   Left Ear: External ear normal.   Mouth/Throat: Oropharynx is clear and moist. No oropharyngeal exudate. Eyes: Pupils are equal, round, and reactive to light. Conjunctivae and EOM are normal. No scleral icterus. Neck: Normal range of motion. Neck supple. No JVD present. No tracheal deviation present. No thyromegaly present. Cardiovascular: Normal rate, regular rhythm and normal heart sounds. Exam reveals no gallop and no friction rub. No murmur heard. Pulmonary/Chest: Effort normal. No stridor. No respiratory distress. She has no wheezes. She has no rales. Abdominal: Soft. She exhibits no distension and no mass. There is no tenderness. There is no rebound and no guarding. No hernia. Neurological: She is alert and oriented to person, place, and time. No cranial nerve deficit. Skin: Skin is warm and dry. No rash noted. She is not diaphoretic. No erythema. No pallor. No signs of bugs or bites from bugs   Psychiatric: She has a normal mood and affect. Her behavior is normal.   Nursing note and vitals reviewed.        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):   Diagnosis management comments: I had long talk with patient's daughter about symptoms we reassured patient that there are no signs of elevated eosinophils for any infection the blood. Given return precautions and advised to follow back up with their primary care physician and neurology team.      Della Faye MD; 10/21/2019 @11:23 PM Voice dictation software was used during the making of this note. This software is not perfect and grammatical and other typographical errors may be present.   This note has not been proofread for errors.  ===================================================================          Amount and/or Complexity of Data Reviewed  Clinical lab tests: ordered and reviewed (Results for orders placed or performed during the hospital encounter of 10/21/19  -CBC WITH AUTOMATED DIFF       Result                      Value             Ref Range           WBC                         5.5               4.3 - 11.1 K*       RBC                         4.04 (L)          4.05 - 5.2 M*       HGB                         12.0              11.7 - 15.4 *       HCT                         37.6              35.8 - 46.3 %       MCV                         93.1              79.6 - 97.8 *       MCH                         29.7              26.1 - 32.9 *       MCHC                        31.9              31.4 - 35.0 *       RDW                         14.9 (H)          11.9 - 14.6 %       PLATELET                    189               150 - 450 K/*       MPV                         13.3 (H)          9.4 - 12.3 FL       ABSOLUTE NRBC               0.00              0.0 - 0.2 K/*       DF                          AUTOMATED                             NEUTROPHILS                 57                43 - 78 %           LYMPHOCYTES                 31                13 - 44 %           MONOCYTES                   9                 4.0 - 12.0 % EOSINOPHILS                 2                 0.5 - 7.8 %         BASOPHILS                   1                 0.0 - 2.0 %         IMMATURE GRANULOCYTES       0                 0.0 - 5.0 %         ABS. NEUTROPHILS            3.2               1.7 - 8.2 K/*       ABS. LYMPHOCYTES            1.7               0.5 - 4.6 K/*       ABS. MONOCYTES              0.5               0.1 - 1.3 K/*       ABS. EOSINOPHILS            0.1               0.0 - 0.8 K/*       ABS. BASOPHILS              0.1               0.0 - 0.2 K/*       ABS. IMM. GRANS.            0.0               0.0 - 0.5 K/*  -METABOLIC PANEL, COMPREHENSIVE       Result                      Value             Ref Range           Sodium                      145               136 - 145 mm*       Potassium                   3.8               3.5 - 5.1 mm*       Chloride                    109 (H)           98 - 107 mmo*       CO2                         31                21 - 32 mmol*       Anion gap                   5 (L)             7 - 16 mmol/L       Glucose                     93                65 - 100 mg/*       BUN                         10                8 - 23 MG/DL        Creatinine                  0.88              0.6 - 1.0 MG*       GFR est AA                  >60               >60 ml/min/1*       GFR est non-AA              >60               >60 ml/min/1*       Calcium                     8.9               8.3 - 10.4 M*       Bilirubin, total            0.4               0.2 - 1.1 MG*       ALT (SGPT)                  60                12 - 65 U/L         AST (SGOT)                  70 (H)            15 - 37 U/L         Alk.  phosphatase            156 (H)           50 - 136 U/L        Protein, total              6.6               6.3 - 8.2 g/*       Albumin                     3.4               3.2 - 4.6 g/*       Globulin                    3.2               2.3 - 3.5 g/*       A-G Ratio                   1.1 (L)           1.2 - 3.5 )  Obtain history from someone other than the patient: yes           Procedures

## 2019-10-22 NOTE — DISCHARGE INSTRUCTIONS
Patient Education   Patient Education        Parkinson's Disease: Care Instructions  Your Care Instructions  Parkinson's disease can cause tremors, stiffness, and problems with movement. Severe or advanced cases can also cause problems with thinking. In Parkinson's disease, part of the brain cannot make enough dopamine, a chemical that helps control movement. Taking your medicines correctly and getting regular exercise may help you maintain your quality of life. There are many things that can cause Parkinson's disease symptoms, including some medicine, some toxins, and trauma to the head. The cause in most cases is not known. Follow-up care is a key part of your treatment and safety. Be sure to make and go to all appointments, and call your doctor if you are having problems. It's also a good idea to know your test results and keep a list of the medicines you take. How can you care for yourself at home? General care  · Take your medicines exactly as prescribed. Call your doctor if you think you are having a problem with your medicine. · Make sure your home is safe:  ? Place furniture so that you have something to hold on to as you walk around the house. ? Use chairs that make it easier to sit down and stand up. ? Group the things you use most, such as reading glasses, keys, and the telephone, in one easy-to-reach place. ? Tack down rugs so that you do not trip. ? Put no-slip tape and handrails in the tub to prevent falls. · Use a cane, walker, or scooter if your doctor suggests it. · Keep up your normal activities as much as you can. · Find ways to manage stress, which can make symptoms worse. · Spend time with family and friends. Join a support group for people with Parkinson's disease if you want extra help. · Depression is common with this condition. Tell your doctor if you have trouble sleeping, are eating too much or are not hungry, or feel sad or tearful all the time.  Depression can be treated with medicine and counseling. Diet and exercise  · Eat a balanced diet. · If you are taking levodopa, do not eat protein at the same time you take your medicine. Levodopa may not work as well if you take it at the same time you eat protein. You can eat normal amounts of protein. Talk to your doctor if you have questions. · If you have problems swallowing, change how and what you eat:  ? Try thick drinks, such as milk shakes. They are easier to swallow than other fluids. ? Do not eat foods that crumble easily. These can cause choking. ? Use a  to prepare food. Soft foods need less chewing. ? Eat small meals often so that you do not get tired from eating heavy meals. · Drink plenty of water and eat a high-fiber diet to prevent constipation. Parkinson's--and the medicines that treat it--may slow your intestines. · Get exercise on most days. Work with your doctor to set up a program of walking, swimming, or other exercise you are able to do. When should you call for help? Call your doctor now or seek immediate medical care if:    · You have a change in your symptoms.     · You develop other problems from your condition, such as:  ? Injury from a fall. ? Thinking or memory problems. ? A urinary tract infection (burning pain when urinating).    Watch closely for changes in your health, and be sure to contact your doctor if:    · You lose weight because of problems with eating.     · You want more information about your condition or your medicines. Where can you learn more? Go to http://abigail-caleb.info/. Enter V867 in the search box to learn more about \"Parkinson's Disease: Care Instructions. \"  Current as of: March 28, 2019  Content Version: 12.2  © 2352-2337 [employer]. Care instructions adapted under license by [employer] (which disclaims liability or warranty for this information).  If you have questions about a medical condition or this instruction, always ask your healthcare professional. [place] [address] any warranty or liability for your use of this information. Dry Skin: Care Instructions  Your Care Instructions  Dry skin is a common problem, especially in areas where the air is very dry. Dry skin can also become a problem as you get older and lose natural oils that keep your skin moist.  A tendency toward dry, itchy skin may run in families. Some problems with the body's defenses (immune system), allergies, or an infection with a fungus may also cause patches of dry skin. An over-the-counter cream may help your dry skin. If your skin problem does not get better with home treatment, your doctor may prescribe ointment. You may need antibiotics if you have a skin infection. Follow-up care is a key part of your treatment and safety. Be sure to make and go to all appointments, and call your doctor if you are having problems. It's also a good idea to know your test results and keep a list of the medicines you take. How can you care for yourself at home? Showers and baths  · Keep showers and baths short, and use warm or lukewarm water. Don't use hot water. It takes off more of your skin's natural oils. · Use as little soap as you can. Choose a mild soap, such as Dove, Cetaphil, or Neutrogena. Or use a skin cleanser like Aquanil or Cetaphil. · If you are taking a bath, use soap only at the very end. Then rinse off all traces of soap with fresh water. Gently pat your skin dry with a towel. Skin creams and moisturizers  · Apply moisturizer or skin cream right away (within 3 minutes) after a bath or shower. Use a moisturizer at other times too, as often as you need it. · Moisturizing creams are better than lotions. Try brands like CeraVe cream, Cetaphil cream, or Eucerin cream.  Other tips  · When washing clothes, use a small amount of detergent. Don't use fabric softeners or dryer sheets.   · For small areas of itchy skin, try an over-the-counter 1% hydrocortisone cream.  · If you have very dry hands, spread petroleum jelly (such as Vaseline) on your hands before bed. Wear thin cotton gloves while you sleep. If your feet are dry, spread Vaseline on them and wear socks while you sleep. When should you call for help? Call your doctor now or seek immediate medical care if:    · You have signs of infection, such as:  ? Pain, warmth, or swelling in the skin. ? Red streaks near a wound in the skin. ? Pus coming from a wound in your skin. ? A fever.    Watch closely for changes in your health, and be sure to contact your doctor if:    · You do not get better as expected. Where can you learn more? Go to http://abigail-caleb.info/. Enter K023 in the search box to learn more about \"Dry Skin: Care Instructions. \"  Current as of: April 1, 2019  Content Version: 12.2  © 2566-6240 "[employer] Care instructions adapted under license by [employer] (which disclaims liability or warranty for this information). If you have questions about a medical condition or this instruction, always ask your healthcare professional. [address] any warranty or liability for your use of this information.

## 2019-10-29 ENCOUNTER — HOSPITAL ENCOUNTER (OUTPATIENT)
Dept: GENERAL RADIOLOGY | Age: 79
Discharge: HOME | End: 2019-10-29
Attending: NURSE PRACTITIONER
Payer: MEDICARE

## 2019-10-29 DIAGNOSIS — R13.19 OTHER DYSPHAGIA: ICD-10-CM

## 2019-10-29 DIAGNOSIS — G20 PARKINSON'S DISEASE (HCC): Chronic | ICD-10-CM

## 2019-10-29 PROCEDURE — 74230 X-RAY XM SWLNG FUNCJ C+: CPT

## 2019-10-29 PROCEDURE — 92611 MOTION FLUOROSCOPY/SWALLOW: CPT

## 2019-10-29 PROCEDURE — 74011000255 HC RX REV CODE- 255: Performed by: NURSE PRACTITIONER

## 2019-10-29 RX ADMIN — BARIUM SULFATE 45 ML: 980 POWDER, FOR SUSPENSION ORAL at 10:30

## 2019-10-29 RX ADMIN — BARIUM SULFATE 15 ML: 400 SUSPENSION ORAL at 10:30

## 2019-10-29 RX ADMIN — BARIUM SULFATE 15 ML: 400 PASTE ORAL at 10:31

## 2019-10-29 NOTE — THERAPY EVALUATION
Steve Ngo  : 1940  [place]  Secondary: [address] [facility] [address], [facility], [address]  [phone]   [phone]       OUTPATIENT SPEECH LANGUAGE PATHOLOGY: MODIFIED BARIUM SWALLOW    ICD-10: Treatment Diagnosis: Oropharyngeal dysphagia (R13.12)  DATE: 10/29/2019  REFERRING PHYSICIAN: Arron Villanueva NP MD Orders: Modifed Barium Swallow  PAST MEDICAL HISTORY:   Ms. Jh Bermeo is a 78 y.o. female who  has a past medical history of Arthritis, Asthma, Cancer ([address] Cancer ([address] (dx---2016), Chronic pain, GERD (gastroesophageal reflux disease), Hypotension, IBS (irritable bowel syndrome), Insomnia, Mass of ampulla of Vater, Orthostatic hypotension, Parkinson's disease ([address] (dx ), Psychiatric disorder, PUD (peptic ulcer disease), Syncope and collapse, Unspecified adverse effect of anesthesia, and Weight loss. She also has no past medical history of Adverse effect of anesthesia, Difficult intubation, Malignant hyperthermia due to anesthesia, Nausea & vomiting, or Pseudocholinesterase deficiency. She also  has a past surgical history that includes hx ercp (2016); hx endoscopy (2016); hx lap cholecystectomy (); hx hysterectomy (); hx orthopaedic (Right, ); hx colonoscopy; hx gi (2012); hx gi (2016 at [facility]); hx hernia repair ( at [facility]); hx gi (2016); hx breast biopsy (Left, 25 YEARS AGO); and colonoscopy (N/A, 2017). RADIOLOGIST:  Dr. Luigi Godoe  MEDICAL/REFERRING DIAGNOSIS: Parkinson's disease ([address] Holden Mooney  Other dysphagia [R13.19]  PRECAUTIONS/ALLERGIES: Aspirin; Lamisil [terbinafine hcl]; Morphine; and Synthroid [levothyroxine]   ASSESSMENT/PLAN OF CARE:Based on the objective data described above, Ms. Jh Bermeo presents with grossly functional oropharyngeal swallow characterized by intermittent flash laryngeal penetration without aspiration of thin liquids and mildly increased oral prep time for masticating cracker. Otherwise, swallows timely with no laryngeal penetration or aspiration observed and no pharyngeal residue after swallow. RECOMMENDATIONS AND PLANNED INTERVENTIONS  DIET:    continue prescribed diet  MEDICATIONS: With liquid    COMPENSATORY STRATEGIES/MODIFICATIONS INCLUDING:  · Upright for all PO  · Alternate liquids/solids  · Small bites and sips  · Remain upright for 20-30 min after any PO  · Slow rate of PO intake  OTHER RECOMMENDATIONS (including follow up treatment recommendations): · Barium Swallow/Esophagram already scheduled for Nov    Thank you for this referral,  Maddy Mota MA, CCC-SLP        SUBJECTIVE:Patient pleasant and cooperative. Present Dysphagia Symptoms: She currently complains of occasional difficulty initiating swallow. Current dietary status prior to evaluation today:  Regular textures, thin liquids  Previous Dysphagia: She has a history of Parkinson's disease, but denies previous swallowing issues  Previous Modified Barium Swallow studies: none    OBJECTIVE:Orientation:    Person   Place   Time   Situation    Oral Assessment:  Labial: No impairment  Dentition: Upper Dentures, Lower Natural and Limited  Oral Hygiene: Adequate  Lingual: No impairment  Vocal Quality: Low volume  Speech Inteligiblity: WFL    Modified barium swallow study was performed in the radiology suite with Ms. Da Mcgrath in the lateral plane seated upright 90° in the [address] [place] [address] chair. To evaluate her swallow function, barium coated liquid and food was administered in the form of thin liquids (by spoon, cup sip, cup gulp, straw sip and serial swallows), pudding, mixed consistency and cracker.     Oral phase of swallow:    prolonged mastication for cracker only    Pharyngeal phase of swallow:   Initiation of swallow:    No impairment for any consistency  Timing of swallow:   No impairment for any consistency  Penetration:   transient   intermittent  Aspiration:   none  Pharyngeal characteristics:   functional pharyngeal swallow   swallows triggered at base of tongue for all consistencies   adequate retraction of base of tongue   adequate hyolaryngeal elevation/excursion   adequate epiglottic inversion   adequate contstriction of posterior pharyngeal wall   adequate laryngeal closure  Pharyngeal clearance:   No residue after swallow of any consistencies  Attempted strategies:    none  Effective strategies:    not applicable  Aspiration/Penetration Scale: 2 (Penetration/no residue. Contrast enters the larynx, remains above the folds/cords, and is cleared.)    Cervical esophageal phase of swallow:    adequate and timely clearance of all boluses through cervical esophagus  **Distal esophagus not assessed due to limitations of MBS study. Assessment only; no treatment provided today. Objective Measure: Tool Used: National Outcomes Measurement System: Functional Communication Measures: SWALLOWING  Score:  Initial: 6     Interpretation of Tool: This measure describes the change in functional communication status subsequent to speech-language pathology treatment of patients with dysphagia.  Level 1:  Individual is not able to swallow anything safely by mouth. All nutrition and hydration is received through non-oral means (e.g., nasogastric tube, PEG).  Level 2: Individual is not able to swallow safely by mouth for nutrition and hydration, but may take some consistency with consistent maximal cues in therapy only. Alternative method of feeding required.  Level 3:  Alternative method of feeding required as individual takes less than 50% of nutrition and hydration by mouth, and/or swallowing is safe with consistent use of moderate cues to use compensatory strategies and/or requires maximum diet restriction.    Level 4:  Swallowing is safe, but usually requires moderate cues to use compensatory strategies, and/or the individual has moderate diet restrictions and/or still requires tube feeding and/or oral supplements.  Level 5:  Swallowing is safe with minimal diet restriction and/or occasionally requires minimal cueing to use compensatory strategies. The individual may occasionally self-cue. All nutrition and hydration needs are met by mouth at mealtime.  Level 6:  Swallowing is safe, and the individual eats and drinks independently and may rarely require minimal cueing. The individual usually self-cues when difficulty occurs. May need to avoid specific food items (e.g., popcorn and nuts), or require additional time (due to dysphagia).  Level 7: The individuals ability to eat independently is not limited by swallow function. Swallowing would be safe and efficient for all consistencies. Compensatory strategies are effectively used when needed. Patient/Family education:    The patient was educated on the following topics: anatomy and physiology of the swallowing mechanism and results and recommendations from this assessment. All questions were answered at this time and comprehension of education was expressed by the patient.        Total Treatment Duration:  Time In: 1010   Time Out: 3424 Damari Yee, ANTHONY-SLP

## 2019-11-05 ENCOUNTER — HOSPITAL ENCOUNTER (OUTPATIENT)
Dept: ULTRASOUND IMAGING | Age: 79
Discharge: HOME | End: 2019-11-05
Attending: FAMILY MEDICINE
Payer: MEDICARE

## 2019-11-05 DIAGNOSIS — R41.3 MEMORY LOSS: ICD-10-CM

## 2019-11-05 DIAGNOSIS — G90.3 NEUROLOGIC ORTHOSTATIC HYPOTENSION (HCC): ICD-10-CM

## 2019-11-05 DIAGNOSIS — R42 DIZZINESS: ICD-10-CM

## 2019-11-05 PROCEDURE — 93880 EXTRACRANIAL BILAT STUDY: CPT

## 2019-11-13 NOTE — PROGRESS NOTES
Please tell the patient that overall the ultrasound was good without any significant mass noted but she does have some narrowing on the left artery so would suggest seeing a vascular specialist to help us monitor this. Stay on a baby aspirin daily.   Let me know if she is agreeable to referral to the vascular specialist.

## 2019-11-15 ENCOUNTER — HOSPITAL ENCOUNTER (OUTPATIENT)
Dept: GENERAL RADIOLOGY | Age: 79
Discharge: HOME | End: 2019-11-15
Attending: PHYSICIAN ASSISTANT
Payer: MEDICARE

## 2019-11-15 DIAGNOSIS — R13.10 DYSPHAGIA: ICD-10-CM

## 2019-11-15 PROCEDURE — 74220 X-RAY XM ESOPHAGUS 1CNTRST: CPT

## 2019-11-15 PROCEDURE — 74011000255 HC RX REV CODE- 255: Performed by: PHYSICIAN ASSISTANT

## 2019-11-15 PROCEDURE — 74011000250 HC RX REV CODE- 250: Performed by: PHYSICIAN ASSISTANT

## 2019-11-15 RX ADMIN — ANTACID/ANTIFLATULENT 4 G: 380; 550; 10; 10 GRANULE, EFFERVESCENT ORAL at 09:30

## 2019-11-15 RX ADMIN — BARIUM SULFATE 700 MG: 700 TABLET ORAL at 09:40

## 2019-11-15 RX ADMIN — BARIUM SULFATE 355 ML: 0.6 SUSPENSION ORAL at 09:29

## 2019-11-15 RX ADMIN — BARIUM SULFATE 135 ML: 980 POWDER, FOR SUSPENSION ORAL at 09:32

## 2020-02-06 PROBLEM — G20 PSYCHOSIS DUE TO PARKINSON'S DISEASE (HCC): Status: ACTIVE | Noted: 2020-02-06

## 2020-07-14 PROBLEM — G20 COGNITIVE DEFICIT DUE TO PARKINSON'S DISEASE (HCC): Status: ACTIVE | Noted: 2020-07-14

## 2020-07-14 PROBLEM — R53.82 CHRONIC FATIGUE: Status: ACTIVE | Noted: 2020-07-14

## 2020-07-31 ENCOUNTER — APPOINTMENT (OUTPATIENT)
Dept: GENERAL RADIOLOGY | Age: 80
End: 2020-07-31
Attending: EMERGENCY MEDICINE
Payer: MEDICARE

## 2020-07-31 ENCOUNTER — HOSPITAL ENCOUNTER (EMERGENCY)
Age: 80
Discharge: HOME | End: 2020-07-31
Attending: EMERGENCY MEDICINE
Payer: MEDICARE

## 2020-07-31 VITALS
HEART RATE: 74 BPM | TEMPERATURE: 98.5 F | RESPIRATION RATE: 26 BRPM | OXYGEN SATURATION: 96 % | DIASTOLIC BLOOD PRESSURE: 60 MMHG | SYSTOLIC BLOOD PRESSURE: 128 MMHG

## 2020-07-31 DIAGNOSIS — R07.9 CHEST PAIN, UNSPECIFIED TYPE: Primary | ICD-10-CM

## 2020-07-31 LAB
ALBUMIN SERPL-MCNC: 3.7 G/DL (ref 3.2–4.6)
ALBUMIN/GLOB SERPL: 1.1 {RATIO} (ref 1.2–3.5)
ALP SERPL-CCNC: 114 U/L (ref 50–136)
ALT SERPL-CCNC: 7 U/L (ref 12–65)
ANION GAP SERPL CALC-SCNC: 5 MMOL/L (ref 7–16)
AST SERPL-CCNC: 15 U/L (ref 15–37)
ATRIAL RATE: 85 BPM
BASOPHILS # BLD: 0.1 K/UL (ref 0–0.2)
BASOPHILS NFR BLD: 2 % (ref 0–2)
BILIRUB SERPL-MCNC: 0.3 MG/DL (ref 0.2–1.1)
BUN SERPL-MCNC: 14 MG/DL (ref 8–23)
CALCIUM SERPL-MCNC: 9.1 MG/DL (ref 8.3–10.4)
CALCULATED P AXIS, ECG09: 76 DEGREES
CALCULATED R AXIS, ECG10: 59 DEGREES
CALCULATED T AXIS, ECG11: 53 DEGREES
CHLORIDE SERPL-SCNC: 107 MMOL/L (ref 98–107)
CO2 SERPL-SCNC: 32 MMOL/L (ref 21–32)
CREAT SERPL-MCNC: 0.92 MG/DL (ref 0.6–1)
DIAGNOSIS, 93000: NORMAL
DIFFERENTIAL METHOD BLD: ABNORMAL
EOSINOPHIL # BLD: 0.3 K/UL (ref 0–0.8)
EOSINOPHIL NFR BLD: 6 % (ref 0.5–7.8)
ERYTHROCYTE [DISTWIDTH] IN BLOOD BY AUTOMATED COUNT: 14.3 % (ref 11.9–14.6)
GLOBULIN SER CALC-MCNC: 3.5 G/DL (ref 2.3–3.5)
GLUCOSE SERPL-MCNC: 101 MG/DL (ref 65–100)
HCT VFR BLD AUTO: 38.4 % (ref 35.8–46.3)
HGB BLD-MCNC: 12.4 G/DL (ref 11.7–15.4)
IMM GRANULOCYTES # BLD AUTO: 0 K/UL (ref 0–0.5)
IMM GRANULOCYTES NFR BLD AUTO: 0 % (ref 0–5)
LIPASE SERPL-CCNC: 23 U/L (ref 73–393)
LYMPHOCYTES # BLD: 1.6 K/UL (ref 0.5–4.6)
LYMPHOCYTES NFR BLD: 29 % (ref 13–44)
MAGNESIUM SERPL-MCNC: 2.1 MG/DL (ref 1.8–2.4)
MCH RBC QN AUTO: 29.8 PG (ref 26.1–32.9)
MCHC RBC AUTO-ENTMCNC: 32.3 G/DL (ref 31.4–35)
MCV RBC AUTO: 92.3 FL (ref 79.6–97.8)
MONOCYTES # BLD: 0.4 K/UL (ref 0.1–1.3)
MONOCYTES NFR BLD: 7 % (ref 4–12)
NEUTS SEG # BLD: 3 K/UL (ref 1.7–8.2)
NEUTS SEG NFR BLD: 56 % (ref 43–78)
NRBC # BLD: 0 K/UL (ref 0–0.2)
P-R INTERVAL, ECG05: 150 MS
PLATELET # BLD AUTO: 224 K/UL (ref 150–450)
PMV BLD AUTO: 12.4 FL (ref 9.4–12.3)
POTASSIUM SERPL-SCNC: 3.3 MMOL/L (ref 3.5–5.1)
PROT SERPL-MCNC: 7.2 G/DL (ref 6.3–8.2)
Q-T INTERVAL, ECG07: 360 MS
QRS DURATION, ECG06: 72 MS
QTC CALCULATION (BEZET), ECG08: 428 MS
RBC # BLD AUTO: 4.16 M/UL (ref 4.05–5.2)
SODIUM SERPL-SCNC: 144 MMOL/L (ref 136–145)
TROPONIN-HIGH SENSITIVITY: 7.5 PG/ML (ref 0–14)
TROPONIN-HIGH SENSITIVITY: 8.5 PG/ML (ref 0–14)
VENTRICULAR RATE, ECG03: 85 BPM
WBC # BLD AUTO: 5.5 K/UL (ref 4.3–11.1)

## 2020-07-31 PROCEDURE — 85025 COMPLETE CBC W/AUTO DIFF WBC: CPT

## 2020-07-31 PROCEDURE — 71045 X-RAY EXAM CHEST 1 VIEW: CPT

## 2020-07-31 PROCEDURE — 83690 ASSAY OF LIPASE: CPT

## 2020-07-31 PROCEDURE — 84484 ASSAY OF TROPONIN QUANT: CPT

## 2020-07-31 PROCEDURE — 96374 THER/PROPH/DIAG INJ IV PUSH: CPT

## 2020-07-31 PROCEDURE — 80053 COMPREHEN METABOLIC PANEL: CPT

## 2020-07-31 PROCEDURE — 83735 ASSAY OF MAGNESIUM: CPT

## 2020-07-31 PROCEDURE — 74011250636 HC RX REV CODE- 250/636: Performed by: EMERGENCY MEDICINE

## 2020-07-31 PROCEDURE — 93005 ELECTROCARDIOGRAM TRACING: CPT | Performed by: EMERGENCY MEDICINE

## 2020-07-31 PROCEDURE — 99284 EMERGENCY DEPT VISIT MOD MDM: CPT

## 2020-07-31 RX ORDER — LORAZEPAM 2 MG/ML
1 INJECTION INTRAMUSCULAR
Status: COMPLETED | OUTPATIENT
Start: 2020-07-31 | End: 2020-07-31

## 2020-07-31 RX ADMIN — LORAZEPAM 1 MG: 2 INJECTION, SOLUTION INTRAMUSCULAR; INTRAVENOUS at 09:35

## 2020-07-31 NOTE — ED NOTES
I have reviewed discharge instructions with the patient. The patient verbalized understanding. Patient left ED via Discharge Method: ambulatory to Home with family. Opportunity for questions and clarification provided. Patient given 0 scripts. No e-sign. Patient wearing face mask upon discharge. To continue your aftercare when you leave the hospital, you may receive an automated call from our care team to check in on how you are doing. This is a free service and part of our promise to provide the best care and service to meet your aftercare needs.  If you have questions, or wish to unsubscribe from this service please call [phone]. Thank you for Choosing our [facility] Emergency Department.

## 2020-07-31 NOTE — ED TRIAGE NOTES
Pt w/c to room 6. Pt wearing mask. Pt states x few days she has palpitations, SOB, and intermittent CP. Pt states she has a known blockage in her heart. Pt also reports cough. Pt denies fever, n/v/d. Pt denies diaphoresis with cp. No swelling to BLE. Dr. Marita Lemus to bedside. Pt states she is also fatigued.

## 2020-07-31 NOTE — DISCHARGE INSTRUCTIONS
Patient Education        Chest Pain: Care Instructions  Your Care Instructions     There are many things that can cause chest pain. Some are not serious and will get better on their own in a few days. But some kinds of chest pain need more testing and treatment. Your doctor may have recommended a follow-up visit in the next 8 to 12 hours. If you are not getting better, you may need more tests or treatment. Even though your doctor has released you, you still need to watch for any problems. The doctor carefully checked you, but sometimes problems can develop later. If you have new symptoms or if your symptoms do not get better, get medical care right away. If you have worse or different chest pain or pressure that lasts more than 5 minutes or you passed out (lost consciousness), [id number] or seek other emergency help right away. A medical visit is only one step in your treatment. Even if you feel better, you still need to do what your doctor recommends, such as going to all suggested follow-up appointments and taking medicines exactly as directed. This will help you recover and help prevent future problems. How can you care for yourself at home? · Rest until you feel better. · Take your medicine exactly as prescribed. Call your doctor if you think you are having a problem with your medicine. · Do not drive after taking a prescription pain medicine. When should you call for help? [id number]:   · You passed out (lost consciousness). · You have severe difficulty breathing. · You have symptoms of a heart attack. These may include:  ? Chest pain or pressure, or a strange feeling in your chest.  ? Sweating. ? Shortness of breath. ? Nausea or vomiting. ? Pain, pressure, or a strange feeling in your back, neck, jaw, or upper belly or in one or both shoulders or arms. ? Lightheadedness or sudden weakness. ? A fast or irregular heartbeat.   After you call 911, the  may tell you to chew 1 adult-strength or 2 to 4 low-dose aspirin. Wait for an ambulance. Do not try to drive yourself. Call your doctor today if:   · You have any trouble breathing. · Your chest pain gets worse. · You are dizzy or lightheaded, or you feel like you may faint. · You are not getting better as expected. · You are having new or different chest pain. Where can you learn more? Go to http://abigail-caleb.info/  Enter A120 in the search box to learn more about \"Chest Pain: Care Instructions. \"  Current as of: June 26, 2019               Content Version: 12.5  © 9797-4229 [employer]. Care instructions adapted under license by [employer] (which disclaims liability or warranty for this information). If you have questions about a medical condition or this instruction, always ask your healthcare professional. [address] any warranty or liability for your use of this information.

## 2020-07-31 NOTE — ED PROVIDER NOTES
Patient with a history of Parkinson's disease and anxiety. For the past 2 to 3 days has had increased fatigue and weakness. Also occasional fluttering in her chest.  Will yesterday morning with 30-minute episode of chest pain sharp in nature. Woke this morning around 8 AM with dull chest pain sternal.  Lasted till prior to arrival.  Pain is resolved with some residual soreness. No shortness of breath nausea numbness or diaphoresis. The history is provided by the patient. No  was used. Chest Pain    This is a new problem. The current episode started more than 2 days ago. The problem has been gradually worsening. Duration of episode(s) is 1 hour. The problem occurs constantly. The pain is associated with normal activity. Pain location: sternal. The pain is moderate. The quality of the pain is described as pressure-like and sharp. The pain does not radiate. Associated symptoms include irregular heartbeat, malaise/fatigue, shortness of breath and weakness. Pertinent negatives include no abdominal pain, no back pain, no cough, no diaphoresis, no dizziness, no exertional chest pressure, no fever, no headaches, no leg pain, no lower extremity edema, no nausea, no near-syncope, no numbness, no orthopnea, no palpitations, no sputum production and no vomiting. She has tried nothing for the symptoms. Her past medical history does not include DM or HTN. Shortness of Breath   Associated symptoms include chest pain. Pertinent negatives include no fever, no headaches, no rhinorrhea, no sore throat, no neck pain, no cough, no sputum production, no wheezing, no orthopnea, no vomiting, no abdominal pain, no rash, no leg pain and no leg swelling.         Past Medical History:   Diagnosis Date    Arthritis     oa-  knees and elbows    Asthma     daily inhalers & nebulizer daily    [facility]     skin- basal cell- removed     [facility] dx---9/2016    bile duct --pancreatic----whipple-    Chronic pain     GERD (gastroesophageal reflux disease)     controlled with meds    Hypotension     \" more stable\" x 6-8 months per spouse---     IBS (irritable bowel syndrome)     Insomnia     Mass of ampulla of Vater     Orthostatic hypotension     followed by dr Kalina Hobson-----    Parkinson's disease [facility]) dx 2009    followed by dr. Celina Baker Psychiatric disorder     anxiety: PRN meds    PUD (peptic ulcer disease)     Syncope and collapse     2 episodes in 8 months per spouse-- last one 6/20/16    Unspecified adverse effect of anesthesia     delayed awakening---     Weight loss     25 lbs over approx 2 months per spouse       Past Surgical History:   Procedure Laterality Date    COLONOSCOPY N/A 12/28/2017    COLONOSCOPY/ 19 performed by Sadia Reeder MD at [place] [address] HX BREAST BIOPSY Left 22 YEARS AGO    HX COLONOSCOPY      HX ENDOSCOPY  08/2016    failed ampullectomy    HX ERCP  06/2016    HX GI  2/2012    bowel obstruction     [facility]  4/2016 [facility]    colonoscopy    HX GI  09/12/2016    Whipple    HX HERNIA REPAIR  2014 at [facility]    mid abd    HX [address] HX LAP CHOLECYSTECTOMY  2001    HX ORTHOPAEDIC Right 2008    R FOOT SURG     HEEL SPUR         Family History:   Problem Relation Age of Onset    Lung Disease Mother     Cancer Father         prostate    Cancer Sister         colon cancer    Cancer Brother        Social History     Socioeconomic History    Marital status:      Spouse name: Not on file    Number of children: Not on file    Years of education: Not on file    Highest education level: Not on file   Occupational History    Not on file   Social Needs    Financial resource strain: Not on file    Food insecurity     Worry: Not on file     Inability: Not on file    Transportation needs     Medical: Not on file     Non-medical: Not on file   Tobacco Use    Smoking status: Never Smoker    Smokeless tobacco: Never Used Substance and Sexual Activity    Alcohol use: No     Alcohol/week: 0.0 standard drinks    Drug use: No    Sexual activity: Not on file   Lifestyle    Physical activity     Days per week: Not on file     Minutes per session: Not on file    Stress: Not on file   Relationships    Social connections     Talks on phone: Not on file     Gets together: Not on file     Attends Yazdanism service: Not on file     Active member of club or organization: Not on file     Attends meetings of clubs or organizations: Not on file     Relationship status: Not on file    Intimate partner violence     Fear of current or ex partner: Not on file     Emotionally abused: Not on file     Physically abused: Not on file     Forced sexual activity: Not on file   Other Topics Concern    Not on file   Social History Narrative    Not on file         ALLERGIES: Aspirin; Lamisil [terbinafine hcl]; Morphine; and Synthroid [levothyroxine]    Review of Systems   Constitutional: Positive for malaise/fatigue. Negative for chills, diaphoresis and fever. HENT: Negative for rhinorrhea and sore throat. Eyes: Negative for pain and redness. Respiratory: Positive for shortness of breath. Negative for cough, sputum production, chest tightness and wheezing. Cardiovascular: Positive for chest pain. Negative for palpitations, orthopnea, leg swelling and near-syncope. Gastrointestinal: Negative for abdominal pain, diarrhea, nausea and vomiting. Genitourinary: Negative for dysuria and hematuria. Musculoskeletal: Negative for back pain, gait problem, neck pain and neck stiffness. Skin: Negative for color change and rash. Neurological: Positive for weakness. Negative for dizziness, numbness and headaches. Vitals:    07/31/20 0925   BP: 182/82   Pulse: 88   Resp: 26   SpO2: 99%            Physical Exam  Constitutional:       Appearance: She is well-developed. HENT:      Head: Normocephalic and atraumatic.    Neck: Musculoskeletal: Normal range of motion and neck supple. Cardiovascular:      Rate and Rhythm: Normal rate and regular rhythm. Pulmonary:      Effort: Pulmonary effort is normal.      Breath sounds: Normal breath sounds. No wheezing. Chest:      Chest wall: No tenderness. Abdominal:      General: Bowel sounds are normal.      Palpations: Abdomen is soft. Tenderness: There is no abdominal tenderness. Musculoskeletal: Normal range of motion. Skin:     General: Skin is warm and dry. Neurological:      Mental Status: She is alert and oriented to person, place, and time. MDM  Number of Diagnoses or Management Options  Diagnosis management comments: Patient with 2- troponins and no EKG changes. Possible anxiety component to her chest pain. Will discharge. Amount and/or Complexity of Data Reviewed  Clinical lab tests: ordered and reviewed  Tests in the radiology section of CPT®: ordered and reviewed  Tests in the medicine section of CPT®: ordered and reviewed    Patient Progress  Patient progress: stable         Procedures      EKG: normal sinus rhythm, nonspecific ST and T waves changes. Rate 85. Results Include:    Recent Results (from the past 24 hour(s))   EKG, 12 LEAD, INITIAL    Collection Time: 07/31/20  9:28 AM   Result Value Ref Range    Ventricular Rate 85 BPM    Atrial Rate 85 BPM    P-R Interval 150 ms    QRS Duration 72 ms    Q-T Interval 360 ms    QTC Calculation (Bezet) 428 ms    Calculated P Axis 76 degrees    Calculated R Axis 59 degrees    Calculated T Axis 53 degrees    Diagnosis       !! AGE AND GENDER SPECIFIC ECG ANALYSIS !!   Sinus rhythm with occasional Premature ventricular complexes  Otherwise normal ECG  When compared with ECG of 11-SEP-2019 20:15,  Premature ventricular complexes are now Present  Non-specific change in ST segment in Inferior leads     CBC WITH AUTOMATED DIFF    Collection Time: 07/31/20  9:35 AM   Result Value Ref Range    WBC 5.5 4.3 - 11.1 K/uL    RBC 4.16 4.05 - 5.2 M/uL    HGB 12.4 11.7 - 15.4 g/dL    HCT 38.4 35.8 - 46.3 %    MCV 92.3 79.6 - 97.8 FL    MCH 29.8 26.1 - 32.9 PG    MCHC 32.3 31.4 - 35.0 g/dL    RDW 14.3 11.9 - 14.6 %    PLATELET 004 181 - 638 K/uL    MPV 12.4 (H) 9.4 - 12.3 FL    ABSOLUTE NRBC 0.00 0.0 - 0.2 K/uL    DF AUTOMATED      NEUTROPHILS 56 43 - 78 %    LYMPHOCYTES 29 13 - 44 %    MONOCYTES 7 4.0 - 12.0 %    EOSINOPHILS 6 0.5 - 7.8 %    BASOPHILS 2 0.0 - 2.0 %    IMMATURE GRANULOCYTES 0 0.0 - 5.0 %    ABS. NEUTROPHILS 3.0 1.7 - 8.2 K/UL    ABS. LYMPHOCYTES 1.6 0.5 - 4.6 K/UL    ABS. MONOCYTES 0.4 0.1 - 1.3 K/UL    ABS. EOSINOPHILS 0.3 0.0 - 0.8 K/UL    ABS. BASOPHILS 0.1 0.0 - 0.2 K/UL    ABS. IMM. GRANS. 0.0 0.0 - 0.5 K/UL   METABOLIC PANEL, COMPREHENSIVE    Collection Time: 07/31/20  9:35 AM   Result Value Ref Range    Sodium 144 136 - 145 mmol/L    Potassium 3.3 (L) 3.5 - 5.1 mmol/L    Chloride 107 98 - 107 mmol/L    CO2 32 21 - 32 mmol/L    Anion gap 5 (L) 7 - 16 mmol/L    Glucose 101 (H) 65 - 100 mg/dL    BUN 14 8 - 23 MG/DL    Creatinine 0.92 0.6 - 1.0 MG/DL    GFR est AA >60 >60 ml/min/1.73m2    GFR est non-AA >60 >60 ml/min/1.73m2    Calcium 9.1 8.3 - 10.4 MG/DL    Bilirubin, total 0.3 0.2 - 1.1 MG/DL    ALT (SGPT) 7 (L) 12 - 65 U/L    AST (SGOT) 15 15 - 37 U/L    Alk.  phosphatase 114 50 - 136 U/L    Protein, total 7.2 6.3 - 8.2 g/dL    Albumin 3.7 3.2 - 4.6 g/dL    Globulin 3.5 2.3 - 3.5 g/dL    A-G Ratio 1.1 (L) 1.2 - 3.5     MAGNESIUM    Collection Time: 07/31/20  9:35 AM   Result Value Ref Range    Magnesium 2.1 1.8 - 2.4 mg/dL   TROPONIN-HIGH SENSITIVITY    Collection Time: 07/31/20  9:35 AM   Result Value Ref Range    Troponin-High Sensitivity 8.5 0 - 14 pg/mL   LIPASE    Collection Time: 07/31/20  9:35 AM   Result Value Ref Range    Lipase 23 (L) 73 - 393 U/L   TROPONIN-HIGH SENSITIVITY    Collection Time: 07/31/20 11:35 AM   Result Value Ref Range    Troponin-High Sensitivity 7.5 0 - 14 pg/mL        XR CHEST PORT (Final result)   Result time 07/31/20 11:21:52   Final result by Edin Sargent MD (07/31/20 11:21:52)                 Impression:     IMPRESSION:     No acute abnormality               Narrative:     EXAMINATION: CHEST RADIOGRAPH 7/31/2020 9:50 AM     ACCESSION NUMBER: [id number]     COMPARISON: Chest radiograph September 11, 2019     INDICATION: sob, cp     TECHNIQUE: A single view of the chest was obtained. FINDINGS:     Support Devices: None     Cardiac Silhouette: Within normal limits in size. Mediastinum: Aorta is normal     Lungs: Pulmonary vascularity is normal. No consolidation. No pneumothorax. No   effusion. Upper Abdomen: Surgical clips in the right upper abdomen     Osseous Structures:  There are no lytic and blastic lesions.

## 2020-10-09 ENCOUNTER — HOSPITAL ENCOUNTER (OUTPATIENT)
Dept: MAMMOGRAPHY | Age: 80
Discharge: HOME | End: 2020-10-09
Attending: FAMILY MEDICINE
Payer: MEDICARE

## 2020-10-09 DIAGNOSIS — Z12.31 ENCOUNTER FOR SCREENING MAMMOGRAM FOR BREAST CANCER: ICD-10-CM

## 2020-10-09 PROCEDURE — 77067 SCR MAMMO BI INCL CAD: CPT

## 2021-01-15 ENCOUNTER — HOSPITAL ENCOUNTER (OUTPATIENT)
Dept: SURGERY | Age: 81
Discharge: HOME | End: 2021-01-15

## 2021-01-20 VITALS — HEIGHT: 61 IN | WEIGHT: 108 LBS | BODY MASS INDEX: 20.39 KG/M2

## 2021-01-20 RX ORDER — HYOSCYAMINE SULFATE 0.125 MG
125 TABLET ORAL
COMMUNITY
End: 2021-05-10 | Stop reason: SDUPTHER

## 2021-01-20 RX ORDER — OXYCODONE HYDROCHLORIDE 5 MG/1
5 TABLET ORAL
COMMUNITY
End: 2021-03-15 | Stop reason: SDUPTHER

## 2021-01-20 NOTE — PERIOP NOTES
Patient verified name, , and procedure. Type: 1a; abbreviated assessment per anesthesia guidelines    Labs per anesthesia: none. Instructed pt that they will be notified the day before their procedure by the GI Lab for time of arrival if their procedure is [facility] and Pre-op for HOSPITAL [place] [address] [facility]. Arrival times should be called by 5 pm. If no phone is received the patient should contact their respective hospital. The GI lab telephone number is [phone] and ES Pre-op is [phone]. Follow diet and prep instructions per office including NPO status. If patient has NOT received instructions from office patient is advised to call surgeon office, verbalizes understanding. Bath or shower the night before and the am of surgery with non-mositurizing soap. No lotions, oils, powders, cologne on skin. No make up, eye make up or jewelry. Wear loose fitting comfortable, clean clothing. Must have adult present in building the entire time . Medications for the day of procedure: sinemet, valium if needed, bentyl if needed, nexium, atarax if needed, levsin if needed, xopenex if needed, midodrine, proair if needed, oxycodone if needed, carafate, lamisil. Patient to hold: Sol Olp. Instructed to bring inhaler dos. The following discharge instructions reviewed with patient: medication given during procedure may cause drowsiness for several hours, therefore, do not drive or operate machinery for remainder of the day. You may not drink alcohol on the day of your procedure, please resume regular diet and activity unless otherwise directed. You may experience abdominal distention for several hours that is relieved by the passage of gas. Contact your physician if you have any of the following: fever or chills, severe abdominal pain or excessive amount of bleeding or a large amount when having a bowel movement.  Occasional specks of blood with bowel movement would not be unusual.

## 2021-01-21 ENCOUNTER — ANESTHESIA EVENT (OUTPATIENT)
Dept: ENDOSCOPY | Age: 81
End: 2021-01-21
Payer: MEDICARE

## 2021-01-22 ENCOUNTER — ANESTHESIA (OUTPATIENT)
Dept: ENDOSCOPY | Age: 81
End: 2021-01-22
Payer: MEDICARE

## 2021-01-22 ENCOUNTER — HOSPITAL ENCOUNTER (OUTPATIENT)
Age: 81
Setting detail: OUTPATIENT SURGERY
Discharge: HOME | End: 2021-01-22
Attending: INTERNAL MEDICINE | Admitting: INTERNAL MEDICINE
Payer: MEDICARE

## 2021-01-22 VITALS
BODY MASS INDEX: 20.07 KG/M2 | DIASTOLIC BLOOD PRESSURE: 71 MMHG | HEART RATE: 77 BPM | SYSTOLIC BLOOD PRESSURE: 123 MMHG | OXYGEN SATURATION: 97 % | WEIGHT: 106.2 LBS | RESPIRATION RATE: 16 BRPM | TEMPERATURE: 98.6 F

## 2021-01-22 PROCEDURE — 88305 TISSUE EXAM BY PATHOLOGIST: CPT

## 2021-01-22 PROCEDURE — 2709999900 HC NON-CHARGEABLE SUPPLY: Performed by: INTERNAL MEDICINE

## 2021-01-22 PROCEDURE — 76040000025: Performed by: INTERNAL MEDICINE

## 2021-01-22 PROCEDURE — 77030021593 HC FCPS BIOP ENDOSC BSC -A: Performed by: INTERNAL MEDICINE

## 2021-01-22 PROCEDURE — 74011250636 HC RX REV CODE- 250/636: Performed by: NURSE ANESTHETIST, CERTIFIED REGISTERED

## 2021-01-22 PROCEDURE — 88312 SPECIAL STAINS GROUP 1: CPT

## 2021-01-22 PROCEDURE — 76060000032 HC ANESTHESIA 0.5 TO 1 HR: Performed by: INTERNAL MEDICINE

## 2021-01-22 PROCEDURE — 74011250636 HC RX REV CODE- 250/636: Performed by: ANESTHESIOLOGY

## 2021-01-22 RX ORDER — PROPOFOL 10 MG/ML
INJECTION, EMULSION INTRAVENOUS AS NEEDED
Status: DISCONTINUED | OUTPATIENT
Start: 2021-01-22 | End: 2021-01-22 | Stop reason: HOSPADM

## 2021-01-22 RX ORDER — PROPOFOL 10 MG/ML
INJECTION, EMULSION INTRAVENOUS
Status: DISCONTINUED | OUTPATIENT
Start: 2021-01-22 | End: 2021-01-22 | Stop reason: HOSPADM

## 2021-01-22 RX ORDER — SODIUM CHLORIDE, SODIUM LACTATE, POTASSIUM CHLORIDE, CALCIUM CHLORIDE 600; 310; 30; 20 MG/100ML; MG/100ML; MG/100ML; MG/100ML
INJECTION, SOLUTION INTRAVENOUS
Status: DISCONTINUED | OUTPATIENT
Start: 2021-01-22 | End: 2021-01-22 | Stop reason: HOSPADM

## 2021-01-22 RX ORDER — SODIUM CHLORIDE, SODIUM LACTATE, POTASSIUM CHLORIDE, CALCIUM CHLORIDE 600; 310; 30; 20 MG/100ML; MG/100ML; MG/100ML; MG/100ML
100 INJECTION, SOLUTION INTRAVENOUS CONTINUOUS
Status: DISCONTINUED | OUTPATIENT
Start: 2021-01-22 | End: 2021-01-22 | Stop reason: HOSPADM

## 2021-01-22 RX ADMIN — SODIUM CHLORIDE, SODIUM LACTATE, POTASSIUM CHLORIDE, AND CALCIUM CHLORIDE 100 ML/HR: 600; 310; 30; 20 INJECTION, SOLUTION INTRAVENOUS at 10:12

## 2021-01-22 RX ADMIN — PROPOFOL 20 MG: 10 INJECTION, EMULSION INTRAVENOUS at 11:00

## 2021-01-22 RX ADMIN — PROPOFOL 20 MG: 10 INJECTION, EMULSION INTRAVENOUS at 11:14

## 2021-01-22 RX ADMIN — SODIUM CHLORIDE, SODIUM LACTATE, POTASSIUM CHLORIDE, AND CALCIUM CHLORIDE: 600; 310; 30; 20 INJECTION, SOLUTION INTRAVENOUS at 10:53

## 2021-01-22 RX ADMIN — PROPOFOL 20 MG: 10 INJECTION, EMULSION INTRAVENOUS at 11:16

## 2021-01-22 RX ADMIN — PROPOFOL 20 MG: 10 INJECTION, EMULSION INTRAVENOUS at 11:02

## 2021-01-22 RX ADMIN — PROPOFOL 25 MCG/KG/MIN: 10 INJECTION, EMULSION INTRAVENOUS at 11:00

## 2021-01-22 NOTE — PROCEDURES
PROCEDURE: Esophagogastroduodenoscopy with biopsy    ENDOSCOPIST: Amando Valdovinos M.D. PREOPERATIVE DIAGNOSIS: abdominal pain    POSTOPERATIVE DIAGNOSIS: gastric and jejunal ulcers    INSTRUMENTS: [id number]    SEDATION: MAC    DESCRIPTION: After informed consent was obtained, the patient was taken to the endoscopy suite and placed in the left lateral decubitus position. Intravenous sedation was administered by the Anesthesia provider. After adequate sedation had been achieved the endoscope was inserted over the tongue and through the posterior pharynx under direct visualization down the esophagus to the stomach and into the second portion of the duodenum. The endoscopic was withdrawn from that point, performing a careful survey of the mucosa. Retroflexion was performed in the gastric fundus. FINDINGS:  Esophagus: Normal    Stomach: Gastrojejunostomy in place. Deep ulcer adjacent to anastomosis with clean base. Biopsies obtained for H.pylori. Duodenum: Large deep ulcer on jejunal side of anastomosis. Remainder of jejunum normal to limit of endoscope    Estimated blood loss:  0 cc-minimal    IMPRESSION: Severe ulcer disease despite PPI. PLAN:  BID PPI. No NSAIDs may be used.       Renaldo Gordillo MD

## 2021-01-22 NOTE — ANESTHESIA PREPROCEDURE EVALUATION
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)     Other Findings            Physical Exam    Airway  Mallampati: II  TM Distance: 4 - 6 cm  Neck ROM: normal range of motion   Mouth opening: Normal     Cardiovascular  Regular rate and rhythm,  S1 and S2 normal,  no murmur, click, rub, or gallop             Dental  No notable dental hx       Pulmonary  Breath sounds clear to auscultation               Abdominal         Other Findings            Anesthetic Plan    ASA: 3  Anesthesia type: total IV anesthesia          Induction: Intravenous  Anesthetic plan and risks discussed with: Patient and Spouse           Anesthetic History               Review of Systems / Medical History  Patient summary reviewed and pertinent labs reviewed    Pulmonary            Asthma : well controlled       Neuro/Psych         Neuromuscular disease (Parkinson's disease)     Cardiovascular                  Exercise tolerance: >4 METS     GI/Hepatic/Renal     GERD: well controlled           Endo/Other             Other Findings              Physical Exam    Airway  Mallampati: II  TM Distance: 4 - 6 cm  Neck ROM: normal range of motion   Mouth opening: Normal     Cardiovascular  Regular rate and rhythm,  S1 and S2 normal,  no murmur, click, rub, or gallop             Dental    Dentition: Full upper dentures     Pulmonary  Breath sounds clear to auscultation               Abdominal         Other Findings            Anesthetic Plan    ASA: 2  Anesthesia type: total IV anesthesia          Induction: Intravenous  Anesthetic plan and risks discussed with: Patient          Anesthetic History               Review of Systems / Medical History  Patient summary reviewed and pertinent labs reviewed    Pulmonary            Asthma : well controlled       Neuro/Psych         Neuromuscular disease (Parkinson's disease)     Cardiovascular                  Exercise tolerance: >4 METS     GI/Hepatic/Renal     GERD: well controlled           Endo/Other             Other Findings              Physical Exam    Airway  Mallampati: II  TM Distance: 4 - 6 cm  Neck ROM: normal range of motion   Mouth opening: Normal     Cardiovascular  Regular rate and rhythm,  S1 and S2 normal,  no murmur, click, rub, or gallop             Dental    Dentition: Full upper dentures     Pulmonary  Breath sounds clear to auscultation               Abdominal         Other Findings            Anesthetic Plan    ASA: 2  Anesthesia type: total IV anesthesia          Induction: Intravenous  Anesthetic plan and risks discussed with: Patient

## 2021-01-22 NOTE — H&P
1.  Abdominal pain    78year old known female patient to Dr. Nick Everett referred back to us today at the request of Dr. Elkin Hand for epigastric abdominal pain. 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 She has had increasing pain for the past 2 mos. She denies any N&V, occasional GERD or recent weight loss. She has increasing pain with ambulation. She has improvement with soft foods. She denies any melena. She is having new onset dysphagia again especially meat at the suprasternal notch. She was started on sucralfate 3x a day. She is on Nexium 40mg one po daily with only minimal relief. \"I'm sure my ulcer is back. \"  O&P was done by her PCP and negative  because she is convinced she has \"mites. \"    EGD on 4/24/2019 by Dr. Nick Everett revealed mild mucosal atrophy and loss of rugae in the stomach. Healthy anastomosis. Small hiatal hernia. Colonoscopy on 12/28/2017 by Dr. Annabel Lancaster revealed small internal hemorrhoids. Cecal polyps. Repeat colonoscopy in 3 years. Pathology revealed tubular adenomas. She denies any change in bowel habits, hematochezia. She has asthma and uses her rescue inhaler/nebulizer bid. She denies any DISHA. She denies any anticoagulants. She has a BM every am. She has occasional hard stools.     Past Medical/Surgical History:   (Detailed)  Disease/disorder Onset Date Management Date Comments     ampullary mass 08/16/2016 CY 08/15/2016 - not amenable to endoscopic resection   Adenocarcinoma of ampulla of Vater  Whipple operation     Irritable bowel disease       Asthma    CEB 07/31/2015 - TML 07/31/2015 -Mild controlled on singulair only   Cancer, skin    CEB 07/31/2015 -   Arthritis    CEB 07/31/2015 -   GERD       Hypertension       Parkinson disease       Peptic ulcer disease       Insomnia    CEB 07/31/2015 -   Anxiety    CEB 07/31/2015 -     Hysterectomy, total abdominal, BSO  CEB 07/31/2015 -     Cholecystectomy     Hx recurrent syncopal episodes per pt, now improved on Midodrine. - cecal polyp       2016- ampullary mass-         Unspecified abdominal surgery 2012. Procedure History  Test Date Results Interp   EGD 2019 Hiatal Hernia without gangrene or obstruction    COLONOSCOPY 09/15/2015 Benign neoplasm, colon, Diverticulosis, colon, Internal hemorrhoids      EGD 4yrs ago or so by Dr. Bob Philip showed a Hiatal hernia per pt. Colonoscopy 2006 by Dr. Sneha Singh  - colo  2016- ercp- stent  2016- EUS  2016- failed attempt at ampullectomy    Family History:  (Detailed)  Relationship Family Member Name  Age at Death Condition Onset Age Cause of Death   Sister  Y  Cancer, colon 62 Y       Social History:  (Detailed)  with four children. Tobacco use reviewed. Preferred language is [place]. MARITAL STATUS/FAMILY/SOCIAL SUPPORT  Currently . Tobacco use status: Current non-smoker. Smoking status: Never smoker. SMOKING STATUS  Type Smoking Status Usage Per Day Years Used Total Pack Years    Never smoker        ALCOHOL  There is no history of alcohol use. CAFFEINE  The patient uses caffeine: coffee and tea. - 3 cups a day. HOME ENVIRONMENT/SAFETY  The patient has not fallen in the last year.     Current Medications:  Medication Directions   carbidopa 25 mg-levodopa 250 mg tablet take 1 tablet by oral route 4 times every day   carbidopa ER 50 mg-levodopa 200 mg tablet,extended release take 1 po qhs   diazepam 10 mg tablet take 1 tablet by oral route 2 times every day as needed   fludrocortisone 0.1 mg tablet take 1 tablet by oral route  every day   hyoscyamine sulfate 0.125 mg tablet take 1 tablet by oral route  every 6 hours as needed   midodrine 10 mg tablet take 1 tablet by oral route 3 times every day   Nexium 40 mg capsule,delayed release take 1 capsule (40MG)  by oral route twice daily   oxybutynin chloride 5 mg tablet TAKE AS NEEDED   oxycodone 5 mg tablet TAKE AS NEEDED   ProAir HFA 90 mcg/actuation Aerosol Inhaler inhale 2 puff by inhalation route  every 4 - 6 hours as needed   Singulair 10 mg tablet take 1 tablet by oral route  every day in the evening   sucralfate 1 gram tablet take 1 tablet (1G)  by oral route 4 times every day CRUSH TABLET AND MAKE SLURRY THEN TAKE AS DIRECTED   terbinafine HCl 250 mg tablet take 1 tablet by oral route  every day   Xopenex 0.63 mg/3 mL solution for nebulization inhale 3 milliliter by nebulization route  every 8 hours   Zofran 4 mg tablet TAKE AS NEEDED   zolpidem 10 mg tablet take 1 Tablet by oral route  every day at bedtime as needed     Allergies:  Ingredient Reaction (Severity) Medication Name Comment   ASPIRIN burning feeling in the stomach     LEVOTHYROXINE SODIUM rash/ itching Synthroid    MORPHINE hallucinations     Reviewed, no changes. Review of Systems:  System Neg/Pos Details   Constitutional Positive Fatigue. Constitutional Negative Weight loss. Respiratory Positive Cough, Dyspnea. Cardio Positive Irregular heartbeat/palpitations. Cardio Negative Chest pain. GI Positive Abdominal pain, Dysphagia, Reflux. GI Negative Jaundice, Rectal bleeding and Vomiting. Neuro Positive Confusion/disorientation. Neuro Negative Seizures. Psych Positive Anxiety. Hema/Lymph Negative Anemia. Vital Signs:  BP mm/Hg Pulse Resp Pulse Ox Temp F Ht (Total in.) Weight (lbs.) Weight (oz.) BMI   116/72 73 16 99 97.50 61.00 103.20  19.50     Physical Exam:  Physical exam:  GENERAL: well nourished, well hydrated, who appears their stated age. PETITE  SKIN: no rashes, jaundice, palmar erythema, or telangiectasias   HEENT: Conjunctiva pink, Sclerae anicteric. No oral ulcerations. PERRLA. Neck without obvious thyromegaly. CARDIOVASCULAR: S1 and S2. No murmurs, gallops or rubs. RESPIRATORY:  Vesicular breath sounds bilaterally without  rales or rhonchi. GI: The abdomen is soft, nondistended, and TENDER IN THE EPIGASTRUM Normoactive bowel sounds x 4 quadrants.  No obvious hepatomegaly or splenomegaly. EXTREMITIES: without cyanosis, clubbing, edema or ecchymosis  NEUROLOGICAL:  Patient is alert and oriented x 4 without any obvious gross deficits. PSYCHIATRIC: Mood appears appropriate with judgement intact. LYMPHATIC: No cervical or supraclavicular adenopathy. ASA 3    Labs/X-Rays:  9/11/2020  WBC 8.8, RBC 3.99, HGB 11.9, HCT 36.1, MCV 91, MCH 29.8, MCHC 33.0, RDW 13.6, , GLUCOSE 109, BUN 12, CREAL 0.71, BUN/CREAT RATIO 17, TOTAL BILI 0.2, TOTAL PROTEIN 6.4, ALBUMIN 4.4, A/G RATIO 2.2, ALT 6, AST 22, ALK PHOS 105, VITAMIN B12 422    Assessment/Plan:  # Detail Type Description    1. Assessment Epigastric abdominal pain (R10.13). Provider Plan  78year old known female patient to Dr. Tian Ramon referred back to us today at the request of Dr. Alix Medina for epigastric abdominal pain. 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 She has had recurrent PUD. This may be recurrent PUD. 1.Schedule diagnostic EGD with Dr. Tian Ramon at [facility] [place] . The risks of the procedure was discussed in detail with patient including, but not limited to, anesthesia, bleeding, infection, perforation, and possible mortality. Patient verbalizes understanding and wishes to proceed with EGD. 2.Continue Nexium and sucralfate as directed         2. Assessment Peptic ulcer disease (K27.9). Provider Plan history of.         3. 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 4. Assessment Hx of colonic polyps (Z86.010). Provider Plan  Colonoscopy on 12/28/2017 by Dr. Kellen Nguyen revealed small internal hemorrhoids. Cecal polyps. Repeat colonoscopy in 3 years. Pathology revealed tubular adenomas. 1.Schedule surveillance colonoscopy with Dr. Tian Ramon at the [facility] [place]. We did discuss increased risk of anesthesia and perforation with her age.  The risks of the procedure was discussed in detail with patient including, but not limited to, anesthesia, bleeding, infection, perforation, missed colon polyps and possible mortality. Patient verbalizes understanding and wishes to proceed with Colonoscopy. Plan Orders Further evaluations ordered today include Paulina W/WO Cytology [id number] to be performed and EGD W/WO Cytology to be performed. Addendum:  Abdominal pain is improved but still present.

## 2021-01-22 NOTE — PROCEDURES
PROCEDURE: Colonoscopy    ENDOSCOPIST: Lyudmila Gonzalez M.D. PREOPERATIVE DIAGNOSIS: h/o polyp    POSTOPERATIVE DIAGNOSIS: normal    SEDATION: MAC    INSTRUMENT:     DESCRIPTION OF PROCEDURE:  After informed consent was obtained the patient was placed in the left lateral decubitus position. Intravenous sedation was administered as above. After adequate sedation had been achieved, digital rectal examination was performed. The colonoscope was then inserted through the anus and followed the course of the rectum and colon to the cecum, which was confirmed by visualization of the ileocecal valve and appendiceal orifice. The colonoscope was withdrawn from that point, performing a careful survey of the mucosa. Retroflexion was performed in the rectum. FINDINGS:  Good prep    Colon: Normal.  Views included retroflexion of the cecum and ascending colon. A tattoo was noted in the cecum    Rectum: Normal    Estimated Blood Loss:  0 cc-min    IMPRESSION: No neoplasia. PLAN: No further screening colonoscopy due to age    DEligio Patino MD

## 2021-01-22 NOTE — ANESTHESIA POSTPROCEDURE EVALUATION
Procedure(s):  ESOPHAGOGASTRODUODENOSCOPY (EGD)  COLONOSCOPY/ BMI 20  ESOPHAGOGASTRODUODENAL (EGD) BIOPSY.     general    Anesthesia Post Evaluation      Multimodal analgesia: multimodal analgesia not used between 6 hours prior to anesthesia start to PACU discharge  Patient location during evaluation: PACU  Patient participation: complete - patient participated  Level of consciousness: awake and alert  Pain score: 0  Pain management: adequate  Airway patency: patent  Anesthetic complications: no  Cardiovascular status: acceptable and hemodynamically stable  Respiratory status: acceptable and spontaneous ventilation  Hydration status: acceptable  Post anesthesia nausea and vomiting:  none  Final Post Anesthesia Temperature Assessment:  Normothermia (36.0-37.5 degrees C)      INITIAL Post-op Vital signs:   Vitals Value Taken Time   /71 01/22/21 1148   Temp 37 °C (98.6 °F) 01/22/21 1133   Pulse 77 01/22/21 1148   Resp 16 01/22/21 1148   SpO2 97 % 01/22/21 1148

## 2021-01-22 NOTE — DISCHARGE INSTRUCTIONS
Colonoscopy: What to Expect at [address] [place] [address]  After a colonoscopy, you'll stay at the clinic for 1 to 2 hours until the medicines wear off. Then you can go home. But you'll need to arrange for a ride. Your doctor will tell you when you can eat and do your other usual activities. Your doctor will talk to you about when you'll need your next colonoscopy. Your doctor can help you decide how often you need to be checked. This will depend on the results of your test and your risk for colorectal cancer. After the test, you may be bloated or have gas pains. You may need to pass gas. If a biopsy was done or a polyp was removed, you may have streaks of blood in your stool (feces) for a few days. Problems such as heavy rectal bleeding may not occur until several weeks after the test. This isn't common. But it can happen after polyps are removed. This care sheet gives you a general idea about how long it will take for you to recover. But each person recovers at a different pace. Follow the steps below to get better as quickly as possible. How can you care for yourself at home? Activity    · Rest when you feel tired.     · You can do your normal activities when it feels okay to do so. Diet    · Follow your doctor's directions for eating.     · Unless your doctor has told you not to, drink plenty of fluids. This helps to replace the fluids that were lost during the colon prep.     · Do not drink alcohol. Medicines    · Your doctor will tell you if and when you can restart your medicines. He or she will also give you instructions about taking any new medicines.     · If you take aspirin or some other blood thinner, ask your doctor if and when to start taking it again. Make sure that you understand exactly what your doctor wants you to do.     · If polyps were removed or a biopsy was done during the test, your doctor may tell you not to take aspirin or other anti-inflammatory medicines for a few days.  These include ibuprofen (Advil, Motrin) and naproxen (Aleve). Other instructions    · For your safety, do not drive or operate machinery until the medicine wears off and you can think clearly. Your doctor may tell you not to drive or operate machinery until the day after your test.     · Do not sign legal documents or make major decisions until the medicine wears off and you can think clearly. The anesthesia can make it hard for you to fully understand what you are agreeing to. Follow-up care is a key part of your treatment and safety. Be sure to make and go to all appointments, and call your doctor if you are having problems. It's also a good idea to know your test results and keep a list of the medicines you take. When should you call for help? Call 911 anytime you think you may need emergency care. For example, call if:    · You passed out (lost consciousness).     · You pass maroon or bloody stools.     · You have trouble breathing. Call your doctor now or seek immediate medical care if:    · You have pain that does not get better after you take pain medicine.     · You are sick to your stomach or cannot drink fluids.     · You have new or worse belly pain.     · You have blood in your stools.     · You have a fever.     · You cannot pass stools or gas. Watch closely for changes in your health, and be sure to contact your doctor if you have any problems. Where can you learn more? Go to http://www.gray.com/  Enter E264 in the search box to learn more about \"Colonoscopy: What to Expect at Home. \"  Current as of: April 29, 2020               Content Version: 12.6  © 2944-7518 [employer]. Care instructions adapted under license by [employer] (which disclaims liability or warranty for this information).  If you have questions about a medical condition or this instruction, always ask your healthcare professional. Shelley Oh disclaims any warranty or liability for your use of this information. Patient Education        Upper GI Endoscopy: What to Expect at [address] had an upper GI endoscopy. Your doctor used a thin, lighted tube that bends to look at the inside of your esophagus, your stomach, and the first part of the small intestine, called the duodenum. After you have an endoscopy, you will stay at the hospital or clinic for 1 to 2 hours. This will allow the medicine to wear off. You will be able to go home after your doctor or nurse checks to make sure that you're not having any problems. You may have to stay overnight if you had treatment during the test. You may have a sore throat for a day or two after the test.  This care sheet gives you a general idea about what to expect after the test.  How can you care for yourself at home? Activity   · Rest as much as you need to after you go home. · You should be able to go back to your usual activities the day after the test.  Diet   · Follow your doctor's directions for eating after the test.  · Drink plenty of fluids (unless your doctor has told you not to). Medications   · If you have a sore throat the day after the test, use an over-the-counter spray to numb your throat. Follow-up care is a key part of your treatment and safety. Be sure to make and go to all appointments, and call your doctor if you are having problems. It's also a good idea to know your test results and keep a list of the medicines you take. When should you call for help? Call 911 anytime you think you may need emergency care. For example, call if:    · You passed out (lost consciousness).     · You have trouble breathing.     · You pass maroon or bloody stools.    Call your doctor now or seek immediate medical care if:    · You have pain that does not get better after your take pain medicine.     · You have new or worse belly pain.     · You have blood in your stools.     · You are sick to your stomach and cannot keep fluids down.     · You have a fever.     · You cannot pass stools or gas. Watch closely for changes in your health, and be sure to contact your doctor if:    · Your throat still hurts after a day or two.     · You do not get better as expected. Where can you learn more? Go to http://www.mann.com/  Enter J454 in the search box to learn more about \"Upper GI Endoscopy: What to Expect at Home. \"  Current as of: April 15, 2020               Content Version: 12.6  © 5747-7350 [employer]. Care instructions adapted under license by [employer] (which disclaims liability or warranty for this information). If you have questions about a medical condition or this instruction, always ask your healthcare professional. [address] any warranty or liability for your use of this information.

## 2021-08-03 PROBLEM — R55 SYNCOPE: Status: RESOLVED | Noted: 2017-08-25 | Resolved: 2021-08-03

## 2021-10-14 ENCOUNTER — HOSPITAL ENCOUNTER (OUTPATIENT)
Dept: CT IMAGING | Age: 81
Discharge: HOME | End: 2021-10-14
Attending: FAMILY MEDICINE
Payer: MEDICARE

## 2021-10-14 DIAGNOSIS — R10.84 GENERALIZED ABDOMINAL PAIN: ICD-10-CM

## 2021-10-14 DIAGNOSIS — K27.9 PUD (PEPTIC ULCER DISEASE): ICD-10-CM

## 2021-10-14 DIAGNOSIS — C17.0 DUODENAL CANCER (HCC): ICD-10-CM

## 2021-10-14 DIAGNOSIS — K21.9 GASTROESOPHAGEAL REFLUX DISEASE WITHOUT ESOPHAGITIS: ICD-10-CM

## 2021-10-14 LAB — CREAT BLD-MCNC: 0.77 MG/DL (ref 0.8–1.5)

## 2021-10-14 PROCEDURE — 82565 ASSAY OF CREATININE: CPT

## 2021-10-14 PROCEDURE — 74011000636 HC RX REV CODE- 636: Performed by: FAMILY MEDICINE

## 2021-10-14 PROCEDURE — 74011000258 HC RX REV CODE- 258: Performed by: FAMILY MEDICINE

## 2021-10-14 PROCEDURE — 74177 CT ABD & PELVIS W/CONTRAST: CPT

## 2021-10-14 RX ORDER — SODIUM CHLORIDE 0.9 % (FLUSH) 0.9 %
10 SYRINGE (ML) INJECTION
Status: COMPLETED | OUTPATIENT
Start: 2021-10-14 | End: 2021-10-14

## 2021-10-14 RX ADMIN — SODIUM CHLORIDE 100 ML: 900 INJECTION, SOLUTION INTRAVENOUS at 15:38

## 2021-10-14 RX ADMIN — DIATRIZOATE MEGLUMINE AND DIATRIZOATE SODIUM 15 ML: 660; 100 LIQUID ORAL; RECTAL at 15:39

## 2021-10-14 RX ADMIN — IOPAMIDOL 80 ML: 755 INJECTION, SOLUTION INTRAVENOUS at 15:38

## 2021-10-14 RX ADMIN — Medication 10 ML: at 15:38

## 2021-11-29 ENCOUNTER — TRANSCRIBE ORDER (OUTPATIENT)
Dept: SCHEDULING | Age: 81
End: 2021-11-29

## 2021-11-29 DIAGNOSIS — Z12.31 VISIT FOR SCREENING MAMMOGRAM: Primary | ICD-10-CM

## 2022-01-14 ENCOUNTER — HOSPITAL ENCOUNTER (EMERGENCY)
Age: 82
Discharge: LEFT BEFORE DISCHARGE | End: 2022-01-14
Payer: MEDICARE

## 2022-01-14 PROCEDURE — 75810000275 HC EMERGENCY DEPT VISIT NO LEVEL OF CARE

## 2022-01-15 PROCEDURE — 75810000275 HC EMERGENCY DEPT VISIT NO LEVEL OF CARE

## 2022-03-18 PROBLEM — D13.5 AMPULLARY ADENOMA: Status: ACTIVE | Noted: 2018-08-24

## 2022-03-19 PROBLEM — G20.A1 PARKINSON DISEASE: Status: ACTIVE | Noted: 2017-03-25

## 2022-03-19 PROBLEM — R53.82 CHRONIC FATIGUE: Status: ACTIVE | Noted: 2020-07-14

## 2022-03-19 PROBLEM — G20 PARKINSON DISEASE (HCC): Status: ACTIVE | Noted: 2017-03-25

## 2022-03-19 PROBLEM — M48.02 CERVICAL STENOSIS OF SPINE: Status: ACTIVE | Noted: 2019-03-24

## 2022-03-19 PROBLEM — I95.1 ORTHOSTATIC HYPOTENSION: Status: ACTIVE | Noted: 2017-08-25

## 2022-03-19 PROBLEM — F06.8 PSYCHOSIS DUE TO PARKINSON'S DISEASE: Status: ACTIVE | Noted: 2020-02-06

## 2022-03-19 PROBLEM — G20.A1 PSYCHOSIS DUE TO PARKINSON'S DISEASE: Status: ACTIVE | Noted: 2020-02-06

## 2022-03-19 PROBLEM — G20.A1 COGNITIVE DEFICIT DUE TO PARKINSON'S DISEASE: Status: ACTIVE | Noted: 2020-07-14

## 2022-03-19 PROBLEM — G90.3 NEUROLOGIC ORTHOSTATIC HYPOTENSION (HCC): Status: ACTIVE | Noted: 2017-03-25

## 2022-03-19 PROBLEM — G20 PSYCHOSIS DUE TO PARKINSON'S DISEASE (HCC): Status: ACTIVE | Noted: 2020-02-06

## 2022-03-19 PROBLEM — R25.1 TREMOR: Status: ACTIVE | Noted: 2017-03-25

## 2022-03-19 PROBLEM — G20 COGNITIVE DEFICIT DUE TO PARKINSON'S DISEASE (HCC): Status: ACTIVE | Noted: 2020-07-14

## 2022-03-19 PROBLEM — R13.19 OTHER DYSPHAGIA: Status: ACTIVE | Noted: 2019-09-30

## 2022-03-20 PROBLEM — F22 EKBOM'S DELUSIONAL PARASITOSIS (HCC): Status: ACTIVE | Noted: 2019-08-19

## 2022-04-22 ENCOUNTER — TRANSCRIBE ORDER (OUTPATIENT)
Dept: SCHEDULING | Age: 82
End: 2022-04-22

## 2022-04-22 DIAGNOSIS — Z12.31 VISIT FOR SCREENING MAMMOGRAM: Primary | ICD-10-CM

## 2022-05-20 ENCOUNTER — HOSPITAL ENCOUNTER (OUTPATIENT)
Dept: MAMMOGRAPHY | Age: 82
Discharge: HOME | End: 2022-05-20
Attending: FAMILY MEDICINE
Payer: MEDICARE

## 2022-05-20 DIAGNOSIS — Z12.31 VISIT FOR SCREENING MAMMOGRAM: ICD-10-CM

## 2022-05-20 PROCEDURE — 77063 BREAST TOMOSYNTHESIS BI: CPT

## 2022-05-27 ENCOUNTER — NURSE TRIAGE (OUTPATIENT)
Dept: OTHER | Facility: CLINIC | Age: 82
End: 2022-05-27

## 2022-05-27 NOTE — TELEPHONE ENCOUNTER
Received call from Adrian De La Paz at [facility] with [employer]. Subjective: Caller states \"It is like I have a very upset stomach. \"     Current Symptoms: 2 episodes of vomiting in one week, diarrhea \"a little bit this morning\", also reports weakness    States is able to keep fluid down    Denies - abdominal pain / diarrhea / black or tarry stool / blood in vomit / new difficulty with urination    Onset: 1 week ago     Associated Symptoms: reduced appetite    Pain Severity: 0/10; N/A; none    Temperature: Denies     What has been tried: Pepto Bismol        Recommended disposition: See in Office Today    Care advice provided, patient verbalizes understanding; denies any other questions or concerns; instructed to call back for any new or worsening symptoms. Patient declined dispo of \"see in office today. \" Transferred to [facility] where Leonid Wu accepted patient call. Attention Provider: Thank you for allowing me to participate in the care of your patient. The patient was connected to triage in response to information provided to the ECC/PSC. Please do not respond through this encounter as the response is not directed to a shared pool.           Reason for Disposition   MILD to MODERATE vomiting (e.g., 1-5 times/day) and lasts > 48 hours (2 days)    Protocols used: VOMITING-ADULT-OH

## 2022-07-05 ENCOUNTER — OFFICE VISIT (OUTPATIENT)
Dept: FAMILY MEDICINE CLINIC | Facility: CLINIC | Age: 82
End: 2022-07-05
Payer: MEDICARE

## 2022-07-05 ENCOUNTER — NURSE ONLY (OUTPATIENT)
Dept: FAMILY MEDICINE CLINIC | Facility: CLINIC | Age: 82
End: 2022-07-05

## 2022-07-05 ENCOUNTER — TELEPHONE (OUTPATIENT)
Dept: FAMILY MEDICINE CLINIC | Facility: CLINIC | Age: 82
End: 2022-07-05

## 2022-07-05 VITALS
HEART RATE: 87 BPM | RESPIRATION RATE: 16 BRPM | TEMPERATURE: 98.7 F | WEIGHT: 111 LBS | HEIGHT: 61 IN | OXYGEN SATURATION: 97 % | SYSTOLIC BLOOD PRESSURE: 132 MMHG | BODY MASS INDEX: 20.96 KG/M2 | DIASTOLIC BLOOD PRESSURE: 51 MMHG

## 2022-07-05 DIAGNOSIS — N30.00 ACUTE CYSTITIS WITHOUT HEMATURIA: Primary | ICD-10-CM

## 2022-07-05 DIAGNOSIS — D49.0 PERI-AMPULLARY NEOPLASM: Primary | ICD-10-CM

## 2022-07-05 DIAGNOSIS — R53.83 OTHER FATIGUE: ICD-10-CM

## 2022-07-05 DIAGNOSIS — Z87.898 HISTORY OF ABDOMINAL PAIN: ICD-10-CM

## 2022-07-05 DIAGNOSIS — N30.00 ACUTE CYSTITIS WITHOUT HEMATURIA: ICD-10-CM

## 2022-07-05 LAB
BASOPHILS # BLD: 0.1 K/UL (ref 0–0.2)
BASOPHILS NFR BLD: 1 % (ref 0–2)
DIFFERENTIAL METHOD BLD: ABNORMAL
EOSINOPHIL # BLD: 0.2 K/UL (ref 0–0.8)
EOSINOPHIL NFR BLD: 3 % (ref 0.5–7.8)
ERYTHROCYTE [DISTWIDTH] IN BLOOD BY AUTOMATED COUNT: 14.5 % (ref 11.9–14.6)
HCT VFR BLD AUTO: 40.9 % (ref 35.8–46.3)
HGB BLD-MCNC: 13.1 G/DL (ref 11.7–15.4)
IMM GRANULOCYTES # BLD AUTO: 0 K/UL (ref 0–0.5)
IMM GRANULOCYTES NFR BLD AUTO: 0 % (ref 0–5)
LYMPHOCYTES # BLD: 1.7 K/UL (ref 0.5–4.6)
LYMPHOCYTES NFR BLD: 23 % (ref 13–44)
MCH RBC QN AUTO: 28.7 PG (ref 26.1–32.9)
MCHC RBC AUTO-ENTMCNC: 32 G/DL (ref 31.4–35)
MCV RBC AUTO: 89.7 FL (ref 79.6–97.8)
MONOCYTES # BLD: 0.6 K/UL (ref 0.1–1.3)
MONOCYTES NFR BLD: 8 % (ref 4–12)
NEUTS SEG # BLD: 4.9 K/UL (ref 1.7–8.2)
NEUTS SEG NFR BLD: 65 % (ref 43–78)
NRBC # BLD: 0 K/UL (ref 0–0.2)
PLATELET # BLD AUTO: 243 K/UL (ref 150–450)
PMV BLD AUTO: 12.8 FL (ref 9.4–12.3)
RBC # BLD AUTO: 4.56 M/UL (ref 4.05–5.2)
WBC # BLD AUTO: 7.4 K/UL (ref 4.3–11.1)

## 2022-07-05 PROCEDURE — G8420 CALC BMI NORM PARAMETERS: HCPCS | Performed by: FAMILY MEDICINE

## 2022-07-05 PROCEDURE — 1123F ACP DISCUSS/DSCN MKR DOCD: CPT | Performed by: FAMILY MEDICINE

## 2022-07-05 PROCEDURE — G8427 DOCREV CUR MEDS BY ELIG CLIN: HCPCS | Performed by: FAMILY MEDICINE

## 2022-07-05 PROCEDURE — 1036F TOBACCO NON-USER: CPT | Performed by: FAMILY MEDICINE

## 2022-07-05 PROCEDURE — G8399 PT W/DXA RESULTS DOCUMENT: HCPCS | Performed by: FAMILY MEDICINE

## 2022-07-05 PROCEDURE — 99213 OFFICE O/P EST LOW 20 MIN: CPT | Performed by: FAMILY MEDICINE

## 2022-07-05 PROCEDURE — 1090F PRES/ABSN URINE INCON ASSESS: CPT | Performed by: FAMILY MEDICINE

## 2022-07-05 RX ORDER — OXYCODONE HYDROCHLORIDE 5 MG/1
5 TABLET ORAL EVERY 6 HOURS PRN
Qty: 20 TABLET | Refills: 0 | Status: SHIPPED | OUTPATIENT
Start: 2022-07-05 | End: 2022-07-05 | Stop reason: SDUPTHER

## 2022-07-05 RX ORDER — OXYCODONE HYDROCHLORIDE 5 MG/1
5 TABLET ORAL EVERY 6 HOURS PRN
Qty: 20 TABLET | Refills: 0 | Status: SHIPPED | OUTPATIENT
Start: 2022-07-05 | End: 2022-09-08 | Stop reason: SDUPTHER

## 2022-07-05 RX ORDER — FLUCONAZOLE 150 MG/1
TABLET ORAL
COMMUNITY
Start: 2022-06-27 | End: 2022-07-05 | Stop reason: ALTCHOICE

## 2022-07-05 ASSESSMENT — PATIENT HEALTH QUESTIONNAIRE - PHQ9
1. LITTLE INTEREST OR PLEASURE IN DOING THINGS: 1
SUM OF ALL RESPONSES TO PHQ QUESTIONS 1-9: 2
2. FEELING DOWN, DEPRESSED OR HOPELESS: 1
SUM OF ALL RESPONSES TO PHQ QUESTIONS 1-9: 2
SUM OF ALL RESPONSES TO PHQ9 QUESTIONS 1 & 2: 2

## 2022-07-05 ASSESSMENT — ENCOUNTER SYMPTOMS: ABDOMINAL PAIN: 0

## 2022-07-05 NOTE — PROGRESS NOTES
Subjective:      Patient ID: Yamilex Veloz is a 78 y.o. female. HPI   the patient comes in for a follow up on uti seen on 6/27 at urgent care. She was having dysuria and urinary frequency. No fever or abdominal pain. She has been more tired recently. The dysuria has resolved. Took a round of Keflex which seemed to help. Doing well otherwise no new complaints.    Past Medical History:   Diagnosis Date    Arthritis     oa-  knees and elbows    Asthma     proair and neb     Cancer ([facility] dx---9/2016    bile duct --pancreatic----whipple-    [facility] ([facility]     skin- basal cell- removed     Chronic pain     GERD (gastroesophageal reflux disease)     controlled with meds    Hypotension     \"off and on\"    IBS (irritable bowel syndrome)     Insomnia     Macular degeneration     Mass of ampulla of Vater     Menopause     Orthostatic hypotension     follow[facility] Dr. Bianca [facility]ed[facility] at [facility]     Parkinson's disease [facility]) dx 2009    followed by [address] [place] [address] Neurology ; on med for control     Psychiatric disorder     anxiety: PRN meds    PUD (peptic ulcer disease)     [facility]     Syncope and collapse     last episode 2 months ago (noted 1/20/21)    Unspecified adverse effect of anesthesia     delayed awakening---     Weight loss        Allergies   Allergen Reactions    Aspirin Other (See Comments)     Painful stomach-- Hx of PUD    Levothyroxine Hives    Morphine Other (See Comments)     Hallucinations       Current Outpatient Medications   Medication Sig Dispense Refill    albuterol sulfate HFA (PROAIR HFA) 108 (90 Base) MCG/ACT inhaler USE 2 INHALATIONS EVERY 6 HOURS AS NEEDED FOR WHEEZING      carbidopa-levodopa (SINEMET)  MG per tablet Take 1 tablet by mouth 4 times daily      carbidopa-levodopa (SINEMET CR)  MG per extended release tablet Take 1 tablet by mouth      cephALEXin (KEFLEX) 500 MG capsule Take 500 mg by mouth 3 times daily      diazePAM (VALIUM) 10 MG tablet Take 10 mg by mouth 2 times daily as needed.  famotidine (PEPCID) 40 MG tablet Take 40 mg by mouth 2 times daily      hydrOXYzine (ATARAX) 25 MG tablet TAKE 1 TABLET THREE TIMES A DAY AS NEEDED FOR ITCHING      Hyoscyamine Sulfate SL 0.125 MG SUBL Place 0.125 mg under the tongue every 4 hours as needed      levalbuterol (XOPENEX) 0.63 MG/3ML nebulization Inhale 0.63 mg into the lungs every 6 hours as needed      memantine (NAMENDA) 5 MG tablet Take 5 mg by mouth 2 times daily      midodrine (PROAMATINE) 10 MG tablet TAKE 1 TABLET THREE TIMES A DAY      montelukast (SINGULAIR) 10 MG tablet Take 10 mg by mouth daily      mupirocin (BACTROBAN) 2 % ointment Apply topically 2 times daily      ondansetron (ZOFRAN-ODT) 4 MG disintegrating tablet Take 4 mg by mouth every 8 hours as needed      oxybutynin (DITROPAN) 5 MG tablet 1-2 tabs Qhs only      potassium chloride (KLOR-CON M) 10 MEQ extended release tablet Take 10 mEq by mouth 2 times daily      sucralfate (CARAFATE) 1 GM tablet Take 1 g by mouth 4 times daily as needed      terbinafine (LAMISIL) 250 MG tablet TAKE 1 TABLET DAILY      zolpidem (AMBIEN) 10 MG tablet Take 10 mg by mouth.  fludrocortisone (FLORINEF) 0.1 MG tablet TAKE 1 TABLET DAILY       No current facility-administered medications for this visit. Social History     Tobacco Use    Smoking status: Never Smoker    Smokeless tobacco: Never Used   Substance Use Topics    Alcohol use: No     Alcohol/week: 0.0 standard drinks    Drug use: No     Review of Systems   Constitutional: Positive for fatigue. Negative for fever. Gastrointestinal: Negative for abdominal pain. Genitourinary: Negative for dysuria and frequency. Blood pressure (!) 132/51, pulse 87, temperature 98.7 °F (37.1 °C), temperature source Temporal, resp. rate 16, height 5' 1\" (1.549 m), weight 111 lb (50.3 kg), SpO2 97 %. Objective:   Physical Exam  Vitals reviewed. Constitutional:       General: She is not in acute distress. Appearance: Normal appearance. Cardiovascular:      Rate and Rhythm: Normal rate and regular rhythm. Pulses: Normal pulses. Pulmonary:      Effort: Pulmonary effort is normal.      Breath sounds: Normal breath sounds. Neurological:      General: No focal deficit present. Mental Status: She is alert and oriented to person, place, and time. Assessment / Plan:      Elisabet was seen today for fatigue and dysuria. Diagnoses and all orders for this visit:    Acute cystitis without hematuria  -     Culture, Urine; Future    Other fatigue  -     TSH; Future  -     Comprehensive Metabolic Panel; Future  -     CBC with Auto Differential; Future       Will notify patient of test results. Will check urine culture to ensure clearance. Follow-up and Dispositions    · Return in about 3 months (around 10/5/2022), or if symptoms worsen or fail to improve. Dictated using voice recognition software.  Proofread, but unrecognized errors may exist.

## 2022-07-06 LAB
ALBUMIN SERPL-MCNC: 4.2 G/DL (ref 3.2–4.6)
ALBUMIN/GLOB SERPL: 1.2 {RATIO} (ref 1.2–3.5)
ALP SERPL-CCNC: 131 U/L (ref 50–136)
ALT SERPL-CCNC: 23 U/L (ref 12–65)
ANION GAP SERPL CALC-SCNC: 5 MMOL/L (ref 7–16)
AST SERPL-CCNC: 23 U/L (ref 15–37)
BILIRUB SERPL-MCNC: 0.2 MG/DL (ref 0.2–1.1)
BUN SERPL-MCNC: 19 MG/DL (ref 8–23)
CALCIUM SERPL-MCNC: 9.2 MG/DL (ref 8.3–10.4)
CHLORIDE SERPL-SCNC: 106 MMOL/L (ref 98–107)
CO2 SERPL-SCNC: 27 MMOL/L (ref 21–32)
CREAT SERPL-MCNC: 1.1 MG/DL (ref 0.6–1)
GLOBULIN SER CALC-MCNC: 3.4 G/DL (ref 2.3–3.5)
GLUCOSE SERPL-MCNC: 87 MG/DL (ref 65–100)
POTASSIUM SERPL-SCNC: 4.9 MMOL/L (ref 3.5–5.1)
PROT SERPL-MCNC: 7.6 G/DL (ref 6.3–8.2)
SODIUM SERPL-SCNC: 138 MMOL/L (ref 136–145)
TSH, 3RD GENERATION: 3.97 UIU/ML (ref 0.36–3.74)

## 2022-07-07 NOTE — RESULT ENCOUNTER NOTE
Let patient know that kidney function was good, liver enzymes are normal.  Thyroid test overall is good, hemoglobin is good at 13.1.   Other labs are normal

## 2022-07-08 LAB
BACTERIA SPEC CULT: NORMAL
SERVICE CMNT-IMP: NORMAL

## 2022-07-12 ENCOUNTER — OFFICE VISIT (OUTPATIENT)
Dept: FAMILY MEDICINE CLINIC | Facility: CLINIC | Age: 82
End: 2022-07-12
Payer: MEDICARE

## 2022-07-12 VITALS
SYSTOLIC BLOOD PRESSURE: 125 MMHG | RESPIRATION RATE: 16 BRPM | WEIGHT: 110.8 LBS | OXYGEN SATURATION: 98 % | BODY MASS INDEX: 20.92 KG/M2 | HEART RATE: 93 BPM | HEIGHT: 61 IN | DIASTOLIC BLOOD PRESSURE: 63 MMHG | TEMPERATURE: 98.1 F

## 2022-07-12 DIAGNOSIS — F51.04 CHRONIC INSOMNIA: ICD-10-CM

## 2022-07-12 DIAGNOSIS — S81.811A SKIN TEAR OF RIGHT LOWER LEG WITHOUT COMPLICATION, INITIAL ENCOUNTER: Primary | ICD-10-CM

## 2022-07-12 PROCEDURE — 1036F TOBACCO NON-USER: CPT | Performed by: FAMILY MEDICINE

## 2022-07-12 PROCEDURE — G8427 DOCREV CUR MEDS BY ELIG CLIN: HCPCS | Performed by: FAMILY MEDICINE

## 2022-07-12 PROCEDURE — 99213 OFFICE O/P EST LOW 20 MIN: CPT | Performed by: FAMILY MEDICINE

## 2022-07-12 PROCEDURE — G8399 PT W/DXA RESULTS DOCUMENT: HCPCS | Performed by: FAMILY MEDICINE

## 2022-07-12 PROCEDURE — 1090F PRES/ABSN URINE INCON ASSESS: CPT | Performed by: FAMILY MEDICINE

## 2022-07-12 PROCEDURE — 90471 IMMUNIZATION ADMIN: CPT | Performed by: FAMILY MEDICINE

## 2022-07-12 PROCEDURE — 90714 TD VACC NO PRESV 7 YRS+ IM: CPT | Performed by: FAMILY MEDICINE

## 2022-07-12 PROCEDURE — G8420 CALC BMI NORM PARAMETERS: HCPCS | Performed by: FAMILY MEDICINE

## 2022-07-12 PROCEDURE — 1123F ACP DISCUSS/DSCN MKR DOCD: CPT | Performed by: FAMILY MEDICINE

## 2022-07-12 RX ORDER — ZOLPIDEM TARTRATE 10 MG/1
10 TABLET ORAL NIGHTLY PRN
Qty: 30 TABLET | Refills: 5 | Status: SHIPPED | OUTPATIENT
Start: 2022-07-12 | End: 2022-08-11

## 2022-07-12 RX ORDER — CEPHALEXIN 500 MG/1
500 CAPSULE ORAL 3 TIMES DAILY
Qty: 21 CAPSULE | Refills: 0 | Status: SHIPPED | OUTPATIENT
Start: 2022-07-12 | End: 2022-07-19

## 2022-07-12 ASSESSMENT — PATIENT HEALTH QUESTIONNAIRE - PHQ9
SUM OF ALL RESPONSES TO PHQ QUESTIONS 1-9: 0
1. LITTLE INTEREST OR PLEASURE IN DOING THINGS: 0
SUM OF ALL RESPONSES TO PHQ9 QUESTIONS 1 & 2: 0
2. FEELING DOWN, DEPRESSED OR HOPELESS: 0
SUM OF ALL RESPONSES TO PHQ QUESTIONS 1-9: 0

## 2022-07-13 NOTE — PROGRESS NOTES
Subjective:      Patient ID: Susan Herbert is a 78 y.o. female. HPI   Patient comes in today with a skin tear that occurred a few days ago when she hit it on the corner of some furniture right anterior tibia. She has been cleaning it with peroxide and putting antibiotic ointment on it. Unsure of last tetanus shot. Past Medical History:   Diagnosis Date    Arthritis     oa-  knees and elbows    Asthma     proair and neb     [facility] dx---9/2016    bile duct --pancreatic----whipple-    Cancer ([facility]     skin- basal cell- removed     Chronic pain     GERD (gastroesophageal reflux disease)     controlled with meds    Hypotension     \"off and on\"    IBS (irritable bowel syndrome)     Insomnia     Macular degeneration     Mass of ampulla of Vater     Menopause     Orthostatic hypotension     followed by Dr. Jase Crabtree at [facility]"" cardio     Parkinson's disease [facility]) dx 2009    followed by [facility] ; on med for control     Psychiatric disorder     anxiety: PRN meds    PUD (peptic ulcer disease)     SVT [facility]supraventricular tachycardia) ([facility]     Syncope and collapse     last episode 2 months ago (noted 1/20/21)    Unspecified adverse effect of anesthesia     delayed awakening---     Weight loss        Allergies   Allergen Reactions    Aspirin Other (See Comments)     Painful stomach-- Hx of PUD    Levothyroxine Hives    Morphine Other (See Comments)     Hallucinations       Current Outpatient Medications   Medication Sig Dispense Refill    cephALEXin (KEFLEX) 500 MG capsule Take 1 capsule by mouth 3 times daily for 7 days 21 capsule 0    zolpidem (AMBIEN) 10 MG tablet Take 1 tablet by mouth nightly as needed for Sleep for up to 30 days.  30 tablet 5    albuterol sulfate HFA (PROAIR HFA) 108 (90 Base) MCG/ACT inhaler USE 2 INHALATIONS EVERY 6 HOURS AS NEEDED FOR WHEEZING      carbidopa-levodopa (SINEMET)  MG per tablet Take 1 tablet by mouth 4 times daily      carbidopa-levodopa (SINEMET CR)  MG per extended release tablet Take 1 tablet by mouth      diazePAM (VALIUM) 10 MG tablet Take 10 mg by mouth 2 times daily as needed.  famotidine (PEPCID) 40 MG tablet Take 40 mg by mouth 2 times daily      fludrocortisone (FLORINEF) 0.1 MG tablet TAKE 1 TABLET DAILY      hydrOXYzine (ATARAX) 25 MG tablet TAKE 1 TABLET THREE TIMES A DAY AS NEEDED FOR ITCHING      levalbuterol (XOPENEX) 0.63 MG/3ML nebulization Inhale 0.63 mg into the lungs every 6 hours as needed      memantine (NAMENDA) 5 MG tablet Take 5 mg by mouth 2 times daily      midodrine (PROAMATINE) 10 MG tablet TAKE 1 TABLET THREE TIMES A DAY      montelukast (SINGULAIR) 10 MG tablet Take 10 mg by mouth daily      ondansetron (ZOFRAN-ODT) 4 MG disintegrating tablet Take 4 mg by mouth every 8 hours as needed      oxybutynin (DITROPAN) 5 MG tablet 1-2 tabs Qhs only      potassium chloride (KLOR-CON M) 10 MEQ extended release tablet Take 10 mEq by mouth 2 times daily      sucralfate (CARAFATE) 1 GM tablet Take 1 g by mouth 4 times daily as needed      terbinafine (LAMISIL) 250 MG tablet TAKE 1 TABLET DAILY      Hyoscyamine Sulfate SL 0.125 MG SUBL Place 0.125 mg under the tongue every 4 hours as needed      mupirocin (BACTROBAN) 2 % ointment Apply topically 2 times daily (Patient not taking: Reported on 7/12/2022)       No current facility-administered medications for this visit. Social History     Tobacco Use    Smoking status: Never Smoker    Smokeless tobacco: Never Used   Substance Use Topics    Alcohol use: No     Alcohol/week: 0.0 standard drinks    Drug use: No     Review of Systems   Musculoskeletal: Negative for arthralgias. Blood pressure 125/63, pulse 93, temperature 98.1 °F (36.7 °C), temperature source Temporal, resp. rate 16, height 5' 1\" (1.549 m), weight 110 lb 12.8 oz (50.3 kg), SpO2 98 %. Objective:   Physical Exam  Vitals reviewed.    Constitutional: Appearance: Normal appearance. Skin:     Comments: 2 skin tears noted right anterior tibia with some erythema but no drainage   Neurological:      Mental Status: She is alert. Assessment / Plan:         Elisabet was seen today for leg injury. Diagnoses and all orders for this visit:    Skin tear of right lower leg without complication, initial encounter  -     cephALEXin (KEFLEX) 500 MG capsule; Take 1 capsule by mouth 3 times daily for 7 days  -     Td, TDVAX, (age 7 yrs+), IM    Chronic insomnia  -     zolpidem (AMBIEN) 10 MG tablet; Take 1 tablet by mouth nightly as needed for Sleep for up to 30 days. Follow-up and Dispositions    · Return if symptoms worsen or fail to improve. Dictated using voice recognition software.  Proofread, but unrecognized errors may exist.

## 2022-07-19 ENCOUNTER — NURSE TRIAGE (OUTPATIENT)
Dept: OTHER | Facility: CLINIC | Age: 82
End: 2022-07-19

## 2022-07-19 NOTE — TELEPHONE ENCOUNTER
Received call from [employer] at [facility] with [employer]. Subjective: Caller states \"I was having back pain when I woke up on Thursday, I have not had a good bowel movement since then. \"     Is currently on abx for a leg wound    Current Symptoms: Weakness, back pain on the right side down low. No BM x6 days, usually goes every day. Reports minimal nausea. Denies bloating    Onset: 6 days ago; gradual    Associated Symptoms: reduced activity    Pain Severity: 7/10; dull; intermittent    Temperature: Denies     What has been tried: suppository and had a very small amount. Miralax    LMP: NA Pregnant: NA    Recommended disposition: See in Office Today    Care advice provided, patient verbalizes understanding; denies any other questions or concerns; instructed to call back for any new or worsening symptoms. Patient/Caller agrees with recommended disposition; writer provided warm transfer to Madison Gonzales at [facility] for appointment scheduling     Attention Provider: Thank you for allowing me to participate in the care of your patient. The patient was connected to triage in response to information provided to the ECC/PSC. Please do not respond through this encounter as the response is not directed to a shared pool.         Reason for Disposition   Last bowel movement (BM) > 4 days ago    Protocols used: Constipation-ADULT-[place]

## 2022-09-07 ENCOUNTER — NURSE TRIAGE (OUTPATIENT)
Dept: OTHER | Facility: CLINIC | Age: 82
End: 2022-09-07

## 2022-09-07 NOTE — TELEPHONE ENCOUNTER
Received call from [facility] at [facility] with [employer]. Subjective: Caller states \"in the past 2-3 weeks she has had some episodes of vomiting. Her stomach is really painful. She had black stool a week ago. \"     Current Symptoms: abdominal pain- right side- lower, black stool, vomiting (not having vomiting or black stool now)    Onset: 3 weeks ago; unchanged    Pain Severity:  states it is moderate-severe     Temperature: denies     What has been tried: NA    Recommended disposition: See in Office Today    Care advice provided, patient verbalizes understanding; denies any other questions or concerns; instructed to call back for any new or worsening symptoms. Patient/Caller agrees with recommended disposition; writer provided warm transfer to [facility] at [facility] for appointment scheduling     Attention Provider: Thank you for allowing me to participate in the care of your patient. The patient was connected to triage in response to information provided to the ECC/PSC. Please do not respond through this encounter as the response is not directed to a shared pool.   Reason for Disposition   MODERATE pain (e.g., interferes with normal activities that comes and goes (cramps) lasts > 24 hours  (Exception: Pain with Vomiting or Diarrhea - see that Protocol.)    Protocols used: Abdominal Pain - Female-ADULT-[place]

## 2022-09-08 ENCOUNTER — OFFICE VISIT (OUTPATIENT)
Dept: FAMILY MEDICINE CLINIC | Facility: CLINIC | Age: 82
End: 2022-09-08
Payer: MEDICARE

## 2022-09-08 ENCOUNTER — NURSE ONLY (OUTPATIENT)
Dept: FAMILY MEDICINE CLINIC | Facility: CLINIC | Age: 82
End: 2022-09-08

## 2022-09-08 VITALS
OXYGEN SATURATION: 97 % | TEMPERATURE: 98.3 F | HEIGHT: 61 IN | SYSTOLIC BLOOD PRESSURE: 132 MMHG | BODY MASS INDEX: 20.62 KG/M2 | HEART RATE: 71 BPM | WEIGHT: 109.2 LBS | DIASTOLIC BLOOD PRESSURE: 71 MMHG | RESPIRATION RATE: 16 BRPM

## 2022-09-08 DIAGNOSIS — R10.84 GENERALIZED ABDOMINAL PAIN: ICD-10-CM

## 2022-09-08 DIAGNOSIS — L65.9 HAIR LOSS: ICD-10-CM

## 2022-09-08 DIAGNOSIS — F41.9 ANXIETY: ICD-10-CM

## 2022-09-08 DIAGNOSIS — R10.84 GENERALIZED ABDOMINAL PAIN: Primary | ICD-10-CM

## 2022-09-08 DIAGNOSIS — Z87.898 HISTORY OF ABDOMINAL PAIN: ICD-10-CM

## 2022-09-08 DIAGNOSIS — R11.0 NAUSEA: ICD-10-CM

## 2022-09-08 DIAGNOSIS — G20 PARKINSON'S DISEASE (HCC): ICD-10-CM

## 2022-09-08 DIAGNOSIS — C17.0 MALIGNANT NEOPLASM OF DUODENUM (HCC): ICD-10-CM

## 2022-09-08 DIAGNOSIS — D49.0 PERI-AMPULLARY NEOPLASM: ICD-10-CM

## 2022-09-08 LAB
BASOPHILS # BLD: 0.1 K/UL (ref 0–0.2)
BASOPHILS NFR BLD: 1 % (ref 0–2)
DIFFERENTIAL METHOD BLD: ABNORMAL
EOSINOPHIL # BLD: 0.2 K/UL (ref 0–0.8)
EOSINOPHIL NFR BLD: 2 % (ref 0.5–7.8)
ERYTHROCYTE [DISTWIDTH] IN BLOOD BY AUTOMATED COUNT: 14.8 % (ref 11.9–14.6)
HCT VFR BLD AUTO: 40.7 % (ref 35.8–46.3)
HGB BLD-MCNC: 12.6 G/DL (ref 11.7–15.4)
IMM GRANULOCYTES # BLD AUTO: 0 K/UL (ref 0–0.5)
IMM GRANULOCYTES NFR BLD AUTO: 0 % (ref 0–5)
LYMPHOCYTES # BLD: 1.8 K/UL (ref 0.5–4.6)
LYMPHOCYTES NFR BLD: 27 % (ref 13–44)
MCH RBC QN AUTO: 28.4 PG (ref 26.1–32.9)
MCHC RBC AUTO-ENTMCNC: 31 G/DL (ref 31.4–35)
MCV RBC AUTO: 91.9 FL (ref 79.6–97.8)
MONOCYTES # BLD: 0.5 K/UL (ref 0.1–1.3)
MONOCYTES NFR BLD: 8 % (ref 4–12)
NEUTS SEG # BLD: 4.1 K/UL (ref 1.7–8.2)
NEUTS SEG NFR BLD: 62 % (ref 43–78)
NRBC # BLD: 0 K/UL (ref 0–0.2)
PLATELET # BLD AUTO: 227 K/UL (ref 150–450)
PMV BLD AUTO: 13.1 FL (ref 9.4–12.3)
RBC # BLD AUTO: 4.43 M/UL (ref 4.05–5.2)
WBC # BLD AUTO: 6.6 K/UL (ref 4.3–11.1)

## 2022-09-08 PROCEDURE — 1090F PRES/ABSN URINE INCON ASSESS: CPT | Performed by: FAMILY MEDICINE

## 2022-09-08 PROCEDURE — 99214 OFFICE O/P EST MOD 30 MIN: CPT | Performed by: FAMILY MEDICINE

## 2022-09-08 PROCEDURE — G8420 CALC BMI NORM PARAMETERS: HCPCS | Performed by: FAMILY MEDICINE

## 2022-09-08 PROCEDURE — G8399 PT W/DXA RESULTS DOCUMENT: HCPCS | Performed by: FAMILY MEDICINE

## 2022-09-08 PROCEDURE — 1036F TOBACCO NON-USER: CPT | Performed by: FAMILY MEDICINE

## 2022-09-08 PROCEDURE — 1123F ACP DISCUSS/DSCN MKR DOCD: CPT | Performed by: FAMILY MEDICINE

## 2022-09-08 PROCEDURE — G8427 DOCREV CUR MEDS BY ELIG CLIN: HCPCS | Performed by: FAMILY MEDICINE

## 2022-09-08 RX ORDER — ONDANSETRON 4 MG/1
4 TABLET, ORALLY DISINTEGRATING ORAL EVERY 8 HOURS PRN
Qty: 21 TABLET | Refills: 1 | Status: SHIPPED | OUTPATIENT
Start: 2022-09-08

## 2022-09-08 RX ORDER — MIDODRINE HYDROCHLORIDE 10 MG/1
TABLET ORAL
Status: CANCELLED | OUTPATIENT
Start: 2022-09-08

## 2022-09-08 RX ORDER — HYDROXYZINE HYDROCHLORIDE 25 MG/1
TABLET, FILM COATED ORAL
Status: CANCELLED | OUTPATIENT
Start: 2022-09-08

## 2022-09-08 RX ORDER — CARBIDOPA AND LEVODOPA 50; 200 MG/1; MG/1
1 TABLET, EXTENDED RELEASE ORAL
Qty: 60 TABLET | Status: CANCELLED | OUTPATIENT
Start: 2022-09-08

## 2022-09-08 RX ORDER — DIAZEPAM 10 MG/1
10 TABLET ORAL 2 TIMES DAILY PRN
Qty: 60 TABLET | Refills: 3 | Status: SHIPPED | OUTPATIENT
Start: 2022-09-08 | End: 2023-01-06

## 2022-09-08 RX ORDER — OXYCODONE HYDROCHLORIDE 5 MG/1
5 TABLET ORAL EVERY 6 HOURS PRN
Qty: 20 TABLET | Refills: 0 | Status: SHIPPED | OUTPATIENT
Start: 2022-09-08 | End: 2022-09-13

## 2022-09-08 SDOH — ECONOMIC STABILITY: FOOD INSECURITY: WITHIN THE PAST 12 MONTHS, YOU WORRIED THAT YOUR FOOD WOULD RUN OUT BEFORE YOU GOT MONEY TO BUY MORE.: NEVER TRUE

## 2022-09-08 SDOH — ECONOMIC STABILITY: FOOD INSECURITY: WITHIN THE PAST 12 MONTHS, THE FOOD YOU BOUGHT JUST DIDN'T LAST AND YOU DIDN'T HAVE MONEY TO GET MORE.: NEVER TRUE

## 2022-09-08 ASSESSMENT — PATIENT HEALTH QUESTIONNAIRE - PHQ9
SUM OF ALL RESPONSES TO PHQ QUESTIONS 1-9: 0
SUM OF ALL RESPONSES TO PHQ9 QUESTIONS 1 & 2: 0
SUM OF ALL RESPONSES TO PHQ QUESTIONS 1-9: 0
SUM OF ALL RESPONSES TO PHQ QUESTIONS 1-9: 0
1. LITTLE INTEREST OR PLEASURE IN DOING THINGS: 0
SUM OF ALL RESPONSES TO PHQ QUESTIONS 1-9: 0
2. FEELING DOWN, DEPRESSED OR HOPELESS: 0

## 2022-09-08 ASSESSMENT — ENCOUNTER SYMPTOMS
DIARRHEA: 0
ABDOMINAL PAIN: 1
CONSTIPATION: 0
NAUSEA: 1

## 2022-09-08 ASSESSMENT — SOCIAL DETERMINANTS OF HEALTH (SDOH): HOW HARD IS IT FOR YOU TO PAY FOR THE VERY BASICS LIKE FOOD, HOUSING, MEDICAL CARE, AND HEATING?: NOT HARD AT ALL

## 2022-09-09 ENCOUNTER — TELEPHONE (OUTPATIENT)
Dept: FAMILY MEDICINE CLINIC | Facility: CLINIC | Age: 82
End: 2022-09-09

## 2022-09-09 LAB
ALBUMIN SERPL-MCNC: 4.2 G/DL (ref 3.2–4.6)
ALBUMIN/GLOB SERPL: 1.2 {RATIO} (ref 1.2–3.5)
ALP SERPL-CCNC: 111 U/L (ref 50–136)
ALT SERPL-CCNC: 11 U/L (ref 12–65)
ANION GAP SERPL CALC-SCNC: 2 MMOL/L (ref 4–13)
AST SERPL-CCNC: 22 U/L (ref 15–37)
BILIRUB SERPL-MCNC: 0.4 MG/DL (ref 0.2–1.1)
BUN SERPL-MCNC: 11 MG/DL (ref 8–23)
CALCIUM SERPL-MCNC: 9.5 MG/DL (ref 8.3–10.4)
CHLORIDE SERPL-SCNC: 108 MMOL/L (ref 101–110)
CO2 SERPL-SCNC: 30 MMOL/L (ref 21–32)
CREAT SERPL-MCNC: 1 MG/DL (ref 0.6–1)
FERRITIN SERPL-MCNC: 53 NG/ML (ref 8–388)
GLOBULIN SER CALC-MCNC: 3.4 G/DL (ref 2.3–3.5)
GLUCOSE SERPL-MCNC: 99 MG/DL (ref 65–100)
LIPASE SERPL-CCNC: 44 U/L (ref 73–393)
POTASSIUM SERPL-SCNC: 5.1 MMOL/L (ref 3.5–5.1)
PROT SERPL-MCNC: 7.6 G/DL (ref 6.3–8.2)
SODIUM SERPL-SCNC: 140 MMOL/L (ref 136–145)

## 2022-09-09 RX ORDER — CEPHALEXIN 500 MG/1
500 CAPSULE ORAL 3 TIMES DAILY
Qty: 21 CAPSULE | Refills: 0 | Status: SHIPPED | OUTPATIENT
Start: 2022-09-09 | End: 2022-09-16

## 2022-09-09 NOTE — PROGRESS NOTES
Subjective:      Patient ID: Bessy Osman is a 78 y.o. female. HPI  Patient comes in today for concerns about some generalized Chandrak[place] also has been concerned about some increased hair loss recentlybdominal pain that has worsened in the last few days. She occasionally has some nausea has had a history of a pancreatic neoplasm. She also has been concerned about some increased hair loss recently  Past Medical History:   Diagnosis Date    Arthritis     oa-  knees and elbows    Asthma     proair and neb     Cancer ([facility] dx---9/2016    bile duct --pancreatic----whipple-    [facility]     skin- basal cell- removed     Chronic pain     GERD (gastroesophageal reflux disease)     controlled with meds    Hypotension     \"off and on\"    IBS (irritable bowel syndrome)     Insomnia     Macular degeneration     Mass of ampulla of Vater     Menopause     Orthostatic hypotension     followed by Dr. Yuri Hopper at [facility] cardio     Parkinson's disease [facility]) dx 2009    followed by [facility] ; on med for control     Psychiatric disorder     anxiety: PRN meds    PUD (peptic ulcer disease)     SVT [facility]raventricular tachycardia) ([facility]     Syncope and collapse     last episode 2 months ago (noted 1/20/21)    Unspecified adverse effect of anesthesia     delayed awakening---     Weight loss        Allergies   Allergen Reactions    Aspirin Other (See Comments)     Painful stomach-- Hx of PUD    Levothyroxine Hives    Morphine Other (See Comments)     Hallucinations       Current Outpatient Medications   Medication Sig Dispense Refill    oxyCODONE (ROXICODONE) 5 MG immediate release tablet Take 1 tablet by mouth every 6 hours as needed for Pain for up to 5 days. Intended supply: 5 days. Take lowest dose possible to manage pain 20 tablet 0    diazePAM (VALIUM) 10 MG tablet Take 1 tablet by mouth 2 times daily as needed for Anxiety for up to 120 days.  60 tablet 3    ondansetron (ZOFRAN-ODT) 4 MG disintegrating tablet Take 1 tablet by mouth every 8 hours as needed for Nausea 21 tablet 1    albuterol sulfate HFA (PROVENTIL;VENTOLIN;PROAIR) 108 (90 Base) MCG/ACT inhaler USE 2 INHALATIONS EVERY 6 HOURS AS NEEDED FOR WHEEZING      carbidopa-levodopa (SINEMET)  MG per tablet Take 1 tablet by mouth 4 times daily      carbidopa-levodopa (SINEMET CR)  MG per extended release tablet Take 1 tablet by mouth      famotidine (PEPCID) 40 MG tablet Take 40 mg by mouth 2 times daily      fludrocortisone (FLORINEF) 0.1 MG tablet TAKE 1 TABLET DAILY      hydrOXYzine (ATARAX) 25 MG tablet TAKE 1 TABLET THREE TIMES A DAY AS NEEDED FOR ITCHING      Hyoscyamine Sulfate SL 0.125 MG SUBL Place 0.125 mg under the tongue every 4 hours as needed      levalbuterol (XOPENEX) 0.63 MG/3ML nebulization Inhale 0.63 mg into the lungs every 6 hours as needed      memantine (NAMENDA) 5 MG tablet Take 5 mg by mouth 2 times daily      midodrine (PROAMATINE) 10 MG tablet TAKE 1 TABLET THREE TIMES A DAY      montelukast (SINGULAIR) 10 MG tablet Take 10 mg by mouth daily      oxybutynin (DITROPAN) 5 MG tablet 1-2 tabs Qhs only      potassium chloride (KLOR-CON M) 10 MEQ extended release tablet Take 10 mEq by mouth 2 times daily      sucralfate (CARAFATE) 1 GM tablet Take 1 g by mouth 4 times daily as needed      terbinafine (LAMISIL) 250 MG tablet TAKE 1 TABLET DAILY      mupirocin (BACTROBAN) 2 % ointment Apply topically 2 times daily (Patient not taking: Reported on 7/12/2022)       No current facility-administered medications for this visit. Social History     Tobacco Use    Smoking status: Never    Smokeless tobacco: Never   Vaping Use    Vaping Use: Never used   Substance Use Topics    Alcohol use: No     Alcohol/week: 0.0 standard drinks    Drug use: No     Review of Systems   Gastrointestinal:  Positive for abdominal pain and nausea. Negative for constipation and diarrhea.      Blood pressure 132/71, pulse 71, temperature 98.3 °F (36.8 °C), temperature source Temporal, resp. rate 16, height 5' 1\" (1.549 m), weight 109 lb 3.2 oz (49.5 kg), SpO2 97 %. Objective:   Physical Exam  Vitals reviewed. Constitutional:       General: She is not in acute distress. Appearance: Normal appearance. Cardiovascular:      Rate and Rhythm: Normal rate and regular rhythm. Pulses: Normal pulses. Pulmonary:      Effort: Pulmonary effort is normal.      Breath sounds: Normal breath sounds. Abdominal:      General: Abdomen is flat. Bowel sounds are normal. There is no distension. Tenderness: There is no abdominal tenderness. There is no guarding. Neurological:      General: No focal deficit present. Mental Status: She is alert and oriented to person, place, and time. Assessment / Plan:    Elisabet was seen today for check-up and med refill clerical.    Diagnoses and all orders for this visit:    Generalized abdominal pain  -     Comprehensive Metabolic Panel; Future  -     CBC with Auto Differential; Future  -     Lipase; Future    Ilene-ampullary neoplasm  -     oxyCODONE (ROXICODONE) 5 MG immediate release tablet; Take 1 tablet by mouth every 6 hours as needed for Pain for up to 5 days. Intended supply: 5 days. Take lowest dose possible to manage pain    History of abdominal pain  -     oxyCODONE (ROXICODONE) 5 MG immediate release tablet; Take 1 tablet by mouth every 6 hours as needed for Pain for up to 5 days. Intended supply: 5 days. Take lowest dose possible to manage pain    Malignant neoplasm of duodenum (HCC)    Nausea  -     ondansetron (ZOFRAN-ODT) 4 MG disintegrating tablet; Take 1 tablet by mouth every 8 hours as needed for Nausea    Parkinson's disease (HCC)    Anxiety  -     diazePAM (VALIUM) 10 MG tablet; Take 1 tablet by mouth 2 times daily as needed for Anxiety for up to 120 days. Hair loss  -     Ferritin;  Future       I have reviewed the patients controlled substance prescription history, as maintained in the [facility] [address] prescription monitoring program, so that the prescription(s) for a  controlled substance can be given. I did query the [facility] prescribers database and no suspicious activity noted. Will notify patient of test results. Follow-up and Dispositions    Return in about 3 months (around 12/8/2022), or if symptoms worsen or fail to improve. Dictated using voice recognition software.  Proofread, but unrecognized errors may exist.

## 2022-09-09 NOTE — TELEPHONE ENCOUNTER
Patient called stating that she thinks she has a UTI, Frequent urination and urine is turning darker. Patient is requesting antibiotic be sent to pharmacy.     Patient had OV yesterday but stated symptoms came up over night

## 2022-09-09 NOTE — TELEPHONE ENCOUNTER
Sent in prescription for:     Requested Prescriptions     Signed Prescriptions Disp Refills    cephALEXin (KEFLEX) 500 MG capsule 21 capsule 0     Sig: Take 1 capsule by mouth 3 times daily for 7 days     Authorizing Provider: Grant Vasquez

## 2022-09-09 NOTE — RESULT ENCOUNTER NOTE
Please tell patient that ferritin or iron level was good, pancreas enzyme was normal.  Kidney function and liver enzymes are good.   Hemoglobin was good at 12.6 and white blood count is normal

## 2022-09-13 ENCOUNTER — OFFICE VISIT (OUTPATIENT)
Dept: NEUROLOGY | Age: 82
End: 2022-09-13
Payer: MEDICARE

## 2022-09-13 VITALS
SYSTOLIC BLOOD PRESSURE: 131 MMHG | HEART RATE: 75 BPM | DIASTOLIC BLOOD PRESSURE: 63 MMHG | HEIGHT: 61 IN | WEIGHT: 109 LBS | BODY MASS INDEX: 20.58 KG/M2

## 2022-09-13 DIAGNOSIS — R25.1 TREMOR: Primary | ICD-10-CM

## 2022-09-13 DIAGNOSIS — K59.01 CONSTIPATION BY DELAYED COLONIC TRANSIT: ICD-10-CM

## 2022-09-13 DIAGNOSIS — G90.3 NEUROLOGIC ORTHOSTATIC HYPOTENSION (HCC): ICD-10-CM

## 2022-09-13 DIAGNOSIS — Z79.899 ENCOUNTER FOR LONG-TERM (CURRENT) USE OF HIGH-RISK MEDICATION: ICD-10-CM

## 2022-09-13 DIAGNOSIS — G20 COGNITIVE DEFICIT DUE TO PARKINSON'S DISEASE (HCC): ICD-10-CM

## 2022-09-13 DIAGNOSIS — G20 PARKINSON DISEASE (HCC): ICD-10-CM

## 2022-09-13 PROCEDURE — G8399 PT W/DXA RESULTS DOCUMENT: HCPCS | Performed by: PSYCHIATRY & NEUROLOGY

## 2022-09-13 PROCEDURE — G8420 CALC BMI NORM PARAMETERS: HCPCS | Performed by: PSYCHIATRY & NEUROLOGY

## 2022-09-13 PROCEDURE — G8427 DOCREV CUR MEDS BY ELIG CLIN: HCPCS | Performed by: PSYCHIATRY & NEUROLOGY

## 2022-09-13 PROCEDURE — 1123F ACP DISCUSS/DSCN MKR DOCD: CPT | Performed by: PSYCHIATRY & NEUROLOGY

## 2022-09-13 PROCEDURE — 1036F TOBACCO NON-USER: CPT | Performed by: PSYCHIATRY & NEUROLOGY

## 2022-09-13 PROCEDURE — 1090F PRES/ABSN URINE INCON ASSESS: CPT | Performed by: PSYCHIATRY & NEUROLOGY

## 2022-09-13 PROCEDURE — 99215 OFFICE O/P EST HI 40 MIN: CPT | Performed by: PSYCHIATRY & NEUROLOGY

## 2022-09-13 RX ORDER — MEMANTINE HYDROCHLORIDE 10 MG/1
TABLET ORAL
Qty: 180 TABLET | Refills: 3 | Status: SHIPPED | OUTPATIENT
Start: 2022-09-13

## 2022-09-13 RX ORDER — DONEPEZIL HYDROCHLORIDE 10 MG/1
10 TABLET, FILM COATED ORAL NIGHTLY
Qty: 90 TABLET | Refills: 3 | Status: SHIPPED | OUTPATIENT
Start: 2022-09-13

## 2022-09-13 RX ORDER — ZOLPIDEM TARTRATE 10 MG/1
TABLET ORAL NIGHTLY PRN
COMMUNITY
Start: 2022-07-12

## 2022-09-13 RX ORDER — MIDODRINE HYDROCHLORIDE 10 MG/1
TABLET ORAL
Qty: 270 TABLET | Refills: 3 | Status: SHIPPED | OUTPATIENT
Start: 2022-09-13

## 2022-09-13 RX ORDER — CARBIDOPA/LEVODOPA 25MG-250MG
1 TABLET ORAL 4 TIMES DAILY
Qty: 360 TABLET | Refills: 3 | Status: SHIPPED | OUTPATIENT
Start: 2022-09-13

## 2022-09-13 RX ORDER — DONEPEZIL HYDROCHLORIDE 10 MG/1
10 TABLET, FILM COATED ORAL NIGHTLY
COMMUNITY
End: 2022-09-13 | Stop reason: SDUPTHER

## 2022-09-13 RX ORDER — FLUDROCORTISONE ACETATE 0.1 MG/1
TABLET ORAL
Qty: 90 TABLET | Refills: 3 | Status: SHIPPED | OUTPATIENT
Start: 2022-09-13

## 2022-09-13 RX ORDER — CARBIDOPA AND LEVODOPA 50; 200 MG/1; MG/1
1 TABLET, EXTENDED RELEASE ORAL NIGHTLY
Qty: 90 TABLET | Refills: 3 | Status: SHIPPED | OUTPATIENT
Start: 2022-09-13

## 2022-09-13 ASSESSMENT — ENCOUNTER SYMPTOMS
EYES NEGATIVE: 1
GASTROINTESTINAL NEGATIVE: 1
RESPIRATORY NEGATIVE: 1
ALLERGIC/IMMUNOLOGIC NEGATIVE: 1

## 2022-09-13 NOTE — PROGRESS NOTES
09/13/22  Myrtis Curlene Mcardle        Chief Complaint:  Chief Complaint   Patient presents with    Neurologic Problem     6 month follow up Parkinson's       Parkinson's Disease Diagnosed: 2009 tremors in head but they began in 2003. They were not visible but she could \"feel it\"         HPI:   Joelle Diallo, 78 y.o.,female here in clinic follow up for continued management of Parkinson's Disease. Having memory issues like recalling names and names of medications, name of movies. After a while it does come back to her. It is starting to bother her. She does not get lost getting around. She is able to do her house work and has no trouble with cooking. She feels like her sinemet dose is good and is not wearing off. Sleeping well. BP is stable. Swallowing is a mild issue with certain foods that are dry or tough. Constipation is also a mild issue but she can use colace and this helps. Or Miralax. Current Neuro related meds:  carbidopa-levodopa (Sinemet-25/250)  mg, Take 1 Tab by mouth four (4) times daily   carbidopa-levodopa ER (SINEMET CR)  mg, 1 tablet QHS     Florinef 0.1 mg, TAKE 1 TABLET DAILY  Midodrine 10mg 1 tab TID  Aricept 10mg 1 tab Q hs  Ambien 10 mg 1 tab QHS PRN (PCP)         Review of Systems:    Review of Systems   Constitutional: Negative. HENT: Negative. Eyes: Negative. Respiratory: Negative. Cardiovascular: Negative. Gastrointestinal: Negative. Endocrine: Negative. Genitourinary: Negative. Musculoskeletal: Negative. Skin: Negative. Allergic/Immunologic: Negative. Neurological: Negative. Hematological: Negative. Psychiatric/Behavioral: Negative. Patient denies:  dizziness or light headedness,  drooling or swallowing issues  constipation,   hallucinations/ visual illusions or impulse control disorder   recent falls.    RBD     RLS    Past Medical History:   Diagnosis Date    Arthritis     oa-  knees and elbows    Asthma proair and neb     Cancer ([facility] [address] dx---9/2016    bile duct --pancreatic----whipple-    Cancer ([facility] [address]     skin- basal cell- removed     Chronic pain     GERD (gastroesophageal reflux disease)     controlled with meds    Hypotension     \"off and on\"    IBS (irritable bowel syndrome)     Insomnia     Macular degeneration     Mass of ampulla of Vater     Menopause     Orthostatic hypotension     followed by Dr. Tariq Sanchez at [facility] cardio     Parkinson's disease [facility]) dx 2009    followed by [facility] Neurology ; on med for control     Psychiatric disorder     anxiety: PRN meds    PUD (peptic ulcer disease)     SVT (supraventricular tachycardia) ([facility] [address]     Syncope and collapse     last episode 2 months ago (noted 1/20/21)    Unspecified adverse effect of anesthesia     delayed awakening---     Weight loss         Past Surgical History:   Procedure Laterality Date    BREAST BIOPSY Left     CHOLECYSTECTOMY, LAPAROSCOPIC  2001    COLONOSCOPY N/A 12/28/2017    COLONOSCOPY/ 19 performed by Dilma Gruber MD at [address] [facility]  1/22/2021         COLONOSCOPY N/A 1/22/2021    COLONOSCOPY/ BMI 20 performed by Lo Dominguez MD at [facility]    ERCP  06/2016    GI  09/12/2016    Whipple    GI  2/2012    bowel obstruction     GI  4/2016 at [facility]    colonoscopy    [address]  2014 at [facility]    mid abd    HYSTERECTOMY (CERVIX STATUS UNKNOWN)  [address] Right 2008    R FOOT SURG     HEEL SPUR    UPPER GASTROINTESTINAL ENDOSCOPY  1/22/2021         UPPER GASTROINTESTINAL ENDOSCOPY  08/2016    failed ampullectomy       Family History   Problem Relation Age of Onset    Lung Disease Mother     Cancer Father         prostate    Cancer Sister         colon cancer    Cancer Brother        Social History     Socioeconomic History    Marital status:      Spouse name: Not on file    Number of children: Not on file    Years of education: Not on file    Highest education level: Not on file   Occupational History    Not on file   Tobacco Use    Smoking status: Never    Smokeless tobacco: Never   Vaping Use    Vaping Use: Never used   Substance and Sexual Activity    Alcohol use: No     Alcohol/week: 0.0 standard drinks    Drug use: No    Sexual activity: Not on file   Other Topics Concern    Not on file   Social History Narrative    Not on file     Social Determinants of Health     Financial Resource Strain: Low Risk     Difficulty of Paying Living Expenses: Not hard at all   Food Insecurity: No Food Insecurity    Worried About Running Out of Food in the Last Year: Never true    Ran Out of Food in the Last Year: Never true   Transportation Needs: Not on file   Physical Activity: Not on file   Stress: Not on file   Social Connections: Not on file   Intimate Partner Violence: Not on file   Housing Stability: Not on file       Current Outpatient Medications on File Prior to Visit   Medication Sig Dispense Refill    zolpidem (AMBIEN) 10 MG tablet nightly as needed. cephALEXin (KEFLEX) 500 MG capsule Take 1 capsule by mouth 3 times daily for 7 days 21 capsule 0    oxyCODONE (ROXICODONE) 5 MG immediate release tablet Take 1 tablet by mouth every 6 hours as needed for Pain for up to 5 days. Intended supply: 5 days. Take lowest dose possible to manage pain 20 tablet 0    diazePAM (VALIUM) 10 MG tablet Take 1 tablet by mouth 2 times daily as needed for Anxiety for up to 120 days.  60 tablet 3    ondansetron (ZOFRAN-ODT) 4 MG disintegrating tablet Take 1 tablet by mouth every 8 hours as needed for Nausea 21 tablet 1    albuterol sulfate HFA (PROVENTIL;VENTOLIN;PROAIR) 108 (90 Base) MCG/ACT inhaler USE 2 INHALATIONS EVERY 6 HOURS AS NEEDED FOR WHEEZING      famotidine (PEPCID) 40 MG tablet Take 40 mg by mouth 2 times daily      hydrOXYzine (ATARAX) 25 MG tablet TAKE 1 TABLET THREE TIMES A DAY AS NEEDED FOR ITCHING      Hyoscyamine Sulfate SL 0.125 MG SUBL Place 0.125 mg under the tongue every 4 hours as needed      levalbuterol (XOPENEX) 0.63 MG/3ML nebulization Inhale 0.63 mg into the lungs every 6 hours as needed      mupirocin (BACTROBAN) 2 % ointment Apply topically 2 times daily      oxybutynin (DITROPAN) 5 MG tablet 1-2 tabs Qhs only      potassium chloride (KLOR-CON M) 10 MEQ extended release tablet Take 10 mEq by mouth 2 times daily      sucralfate (CARAFATE) 1 GM tablet Take 1 g by mouth 4 times daily as needed       No current facility-administered medications on file prior to visit. Allergies   Allergen Reactions    Aspirin Other (See Comments)     Painful stomach-- Hx of PUD    Levothyroxine Hives    Morphine Other (See Comments)     Hallucinations           Physical Examination    Vitals:    09/13/22 1346 09/13/22 1355   BP: (!) 121/49 131/63   Site: Left Upper Arm Left Upper Arm   Position: Sitting Standing   Pulse: 71 75   Weight: 109 lb (49.4 kg)    Height: 5' 1\" (1.549 m)          General: No acute distress  Psychiatric: well oriented, normal mood and affect  Cardiovascular: no peripheral edema, no JVD, no carotid bruits, RRR  Pulmonary: CTAB  Skin: No rashes, lesions or ulcers  Ext: no C/C/E      Neurologic Exam  Patient oriented to Person, Place and Time. Language and fund of knowledge intact. CN:  Extraocular movements are intact,facial sensation  Intact and strength is intact, , palate elevates normally, tongue protrudes midline, shoulder shrug is normal.    Motor:RUE 5/5, RLE 5/5, LUE 5/5, LLE 5/5 ,  normal bulk and tone    Reflexes: Patellar reflexes are +2 , Achilles reflexes are 1+ , toes are downgoing. Sensation: Normal  light touch, temperature and vibration throughout     Cerebellar: FTN, HTS intact    Motor Examination: Last dose of sinemet was 1 hrs ago.     Voice tremor       Chin tremor         RIGHT LEFT   Tremor at rest 0 1   Postural Tremor of hands 0 0   Action Tremor of hands 0 0   Tremor of Lower extremity 0 0   Open/Close 0 0   Rapid Alternating Movements of Hands 1 1   Finger Taps 0 0   Rigidity - Upper extremity 1 1   Rigidity- Lower extremity  1 1   Foot tapping/LE agility 0 0         Hypophonia 3   Hypomimia 2   Arising from Chair Slow but no assistance   Posture WNL   Gait Slow with decreased stride and decreased arm swing and slow turns. Pull test Deferred due to off exam   Dyskinesia  absent   Body Bradykinsesia 2        Assessment/Plan:   Diagnosis Orders   1. Tremor        2. Parkinson disease ([facility] [address]        3. Encounter for long-term (current) use of high-risk medication        4. Neurologic orthostatic hypotension (HCC)        5. Constipation by delayed colonic transit        6. Cognitive deficit due to Parkinson's disease ([address]            Tremor predominant PD H&Y stage 2 that is stable overall though today is an off exam because she is past due for her dose. No changes to her sinemet is needed. Memory is worsening so I willl add namenda and monitor. She is already taking aricept. She is on ambien 10mg. I would prefer her not take this but she has been taking it for several years. May cause memory issues     I have spent greater than 50% of the patient's 60 minute visit  in counseling for importance of exercise,  medications and education of disease and disease progression. Patient is to continue all other medications as directed by prescribing physicians unless addressed above in plan. Continuation of these medications from today's visit are made based on the patient's report of current medications.      Geri Maurice MD  [facility]  Director Movement Disorders Program  [facility]. [address]  [place], [address]   Phone: [phone]  Fax: [phone]

## 2022-11-17 ENCOUNTER — OFFICE VISIT (OUTPATIENT)
Dept: FAMILY MEDICINE CLINIC | Facility: CLINIC | Age: 82
End: 2022-11-17
Payer: MEDICARE

## 2022-11-17 VITALS
WEIGHT: 113.6 LBS | HEIGHT: 61 IN | BODY MASS INDEX: 21.45 KG/M2 | SYSTOLIC BLOOD PRESSURE: 122 MMHG | HEART RATE: 75 BPM | OXYGEN SATURATION: 99 % | DIASTOLIC BLOOD PRESSURE: 62 MMHG | TEMPERATURE: 97 F | RESPIRATION RATE: 16 BRPM

## 2022-11-17 DIAGNOSIS — S81.812A SKIN TEAR OF LEFT LOWER LEG WITHOUT COMPLICATION, INITIAL ENCOUNTER: ICD-10-CM

## 2022-11-17 DIAGNOSIS — L29.9 GENERALIZED PRURITUS: ICD-10-CM

## 2022-11-17 DIAGNOSIS — N30.00 ACUTE CYSTITIS WITHOUT HEMATURIA: Primary | ICD-10-CM

## 2022-11-17 DIAGNOSIS — R10.9 ABDOMINAL CRAMPING: ICD-10-CM

## 2022-11-17 LAB
BILIRUBIN, URINE, POC: NEGATIVE
BLOOD URINE, POC: NEGATIVE
GLUCOSE URINE, POC: NEGATIVE
KETONES, URINE, POC: NEGATIVE
LEUKOCYTE ESTERASE, URINE, POC: NEGATIVE
NITRITE, URINE, POC: NEGATIVE
PH, URINE, POC: 5 (ref 4.6–8)
PROTEIN,URINE, POC: NORMAL
SPECIFIC GRAVITY, URINE, POC: 1.02 (ref 1–1.03)
URINALYSIS CLARITY, POC: CLEAR
URINALYSIS COLOR, POC: YELLOW
UROBILINOGEN, POC: 0.2

## 2022-11-17 PROCEDURE — 99213 OFFICE O/P EST LOW 20 MIN: CPT | Performed by: PHYSICIAN ASSISTANT

## 2022-11-17 PROCEDURE — 1036F TOBACCO NON-USER: CPT | Performed by: PHYSICIAN ASSISTANT

## 2022-11-17 PROCEDURE — G8427 DOCREV CUR MEDS BY ELIG CLIN: HCPCS | Performed by: PHYSICIAN ASSISTANT

## 2022-11-17 PROCEDURE — G8484 FLU IMMUNIZE NO ADMIN: HCPCS | Performed by: PHYSICIAN ASSISTANT

## 2022-11-17 PROCEDURE — 81003 URINALYSIS AUTO W/O SCOPE: CPT | Performed by: PHYSICIAN ASSISTANT

## 2022-11-17 PROCEDURE — 1123F ACP DISCUSS/DSCN MKR DOCD: CPT | Performed by: PHYSICIAN ASSISTANT

## 2022-11-17 PROCEDURE — 1090F PRES/ABSN URINE INCON ASSESS: CPT | Performed by: PHYSICIAN ASSISTANT

## 2022-11-17 PROCEDURE — G8420 CALC BMI NORM PARAMETERS: HCPCS | Performed by: PHYSICIAN ASSISTANT

## 2022-11-17 PROCEDURE — G8399 PT W/DXA RESULTS DOCUMENT: HCPCS | Performed by: PHYSICIAN ASSISTANT

## 2022-11-17 RX ORDER — FAMOTIDINE 40 MG/1
40 TABLET, FILM COATED ORAL 2 TIMES DAILY
Qty: 90 TABLET | Refills: 3 | Status: CANCELLED | OUTPATIENT
Start: 2022-11-17

## 2022-11-17 RX ORDER — POTASSIUM CHLORIDE 750 MG/1
10 TABLET, EXTENDED RELEASE ORAL 2 TIMES DAILY
Qty: 180 TABLET | Refills: 3 | Status: CANCELLED | OUTPATIENT
Start: 2022-11-17

## 2022-11-17 RX ORDER — LEVALBUTEROL INHALATION SOLUTION 0.63 MG/3ML
0.63 SOLUTION RESPIRATORY (INHALATION) EVERY 6 HOURS PRN
Qty: 120 EACH | Refills: 1 | Status: CANCELLED | OUTPATIENT
Start: 2022-11-17

## 2022-11-17 RX ORDER — HYOSCYAMINE SULFATE 0.12 MG/1
0.12 TABLET SUBLINGUAL EVERY 4 HOURS PRN
Qty: 120 EACH | Refills: 1 | Status: SHIPPED | OUTPATIENT
Start: 2022-11-17

## 2022-11-17 RX ORDER — HYDROXYZINE HYDROCHLORIDE 25 MG/1
TABLET, FILM COATED ORAL
Qty: 30 TABLET | Refills: 2 | Status: SHIPPED | OUTPATIENT
Start: 2022-11-17

## 2022-11-17 ASSESSMENT — ENCOUNTER SYMPTOMS
SHORTNESS OF BREATH: 0
ABDOMINAL PAIN: 0
NAUSEA: 0

## 2022-11-17 ASSESSMENT — PATIENT HEALTH QUESTIONNAIRE - PHQ9
2. FEELING DOWN, DEPRESSED OR HOPELESS: 0
SUM OF ALL RESPONSES TO PHQ QUESTIONS 1-9: 0
SUM OF ALL RESPONSES TO PHQ QUESTIONS 1-9: 0
SUM OF ALL RESPONSES TO PHQ9 QUESTIONS 1 & 2: 0
1. LITTLE INTEREST OR PLEASURE IN DOING THINGS: 0
SUM OF ALL RESPONSES TO PHQ QUESTIONS 1-9: 0
SUM OF ALL RESPONSES TO PHQ QUESTIONS 1-9: 0

## 2022-11-17 NOTE — PROGRESS NOTES
[facility]  Phone [phone]      Patient: Corinne Grace  YOB: 1940  Age 78 y.o. Sex female  Medical Record:  [id number]  Visit Date: 11/17/22  Author:  Stephany Billings PA-C    Family Practice Clinic Note    Chief Complaint   Patient presents with    Follow-up     UTI - Treated at urgent care    Other     Wound on left lower leg       History of Present Illness  This is an 78-year-old female who presents today for urgent care follow-up. She was seen 9 days ago at a local urgent care with complaints of urinary symptoms. She was experiencing dysuria and increased frequency. She had one-time gross hematuria but has not seen any blood since. She was treated for urinary tract infection with Keflex. She states that she has completed the course. Urinary symptoms have resolved but patient was instructed to return and have a repeat UA done here for follow-up. She states that she also has a wound on her left lower leg/shin region. She struck it on something at home few days ago. She states that she has a history of skin tears from minor traumas in the past.  Current symptoms are similar. She has been cleaning the wound daily and applying a dressing. Simply wants to have it evaluated today. She denies any bleeding or discharge from the wound. No fever or chills.     Past History:    Past Medical history   Past Medical History:   Diagnosis Date    Arthritis     oa-  knees and elbows    Asthma     proair and neb     Cancer ([facility] [address] dx---9/2016    bile duct --pancreatic----whipple-    Cancer (HCC)     skin- basal cell- removed     Chronic pain     GERD (gastroesophageal reflux disease)     controlled with meds    Hypotension     \"off and on\"    IBS (irritable bowel syndrome)     Insomnia     Macular degeneration     Mass of ampulla of Vater     Menopause     Orthostatic hypotension     followed by [facility]. Minh Moya at [facility] cardio Parkinson's disease ([facility] [address] dx 2009    followed by [facility] Neurology ; on med for control     Psychiatric disorder     anxiety: PRN meds    PUD (peptic ulcer disease)     SVT (supraventricular tachycardia) ([facility] [address]     Syncope and collapse     last episode 2 months ago (noted 1/20/21)    Unspecified adverse effect of anesthesia     delayed awakening---     Weight loss        Current Problem List:   Patient Active Problem List   Diagnosis    Ampullary adenoma    Adenomyoma of gallbladder    Lower abdominal pain    Parkinson's disease ([address]    Pleural effusion    Environmental and seasonal allergies    Psychosis due to Parkinson's disease ([facility] [address]    Pulmonary infiltrates    Other dysphagia    Ilene-ampullary neoplasm    Hypoxemia    Leukocytosis (leucocytosis)    Parkinson disease (HCC)    Post-operative state    Chronic fatigue    Cognitive deficit due to Parkinson's disease ([facility] [address]    Syncope and collapse    Cervical stenosis of spine    Orthostatic hypotension    Tremor    Neurologic orthostatic hypotension (HCC)    Heel spur    Asthma    Ekbom's delusional parasitosis (HCC)    GERD (gastroesophageal reflux disease)       Current Medications: . Current Outpatient Medications   Medication Sig Dispense Refill    Hyoscyamine Sulfate SL 0.125 MG SUBL Place 0.125 mg under the tongue every 4 hours as needed (abdominal cramping) 120 each 1    hydrOXYzine HCl (ATARAX) 25 MG tablet TAKE 1 TABLET THREE TIMES A DAY AS NEEDED FOR ITCHING 30 tablet 2    zolpidem (AMBIEN) 10 MG tablet nightly as needed.       carbidopa-levodopa (SINEMET)  MG per tablet Take 1 tablet by mouth 4 times daily 360 tablet 3    carbidopa-levodopa (SINEMET CR)  MG per extended release tablet Take 1 tablet by mouth nightly 90 tablet 3    fludrocortisone (FLORINEF) 0.1 MG tablet TAKE 1 TABLET DAILY 90 tablet 3    midodrine (PROAMATINE) 10 MG tablet TAKE 1 TABLET THREE TIMES A  tablet 3    donepezil (ARICEPT) 10 MG tablet Take 1 tablet by mouth nightly 90 tablet 3    memantine (NAMENDA) 10 MG tablet 1/2 tab BID for 2 weeks then increase to 1 tab  tablet 3    diazePAM (VALIUM) 10 MG tablet Take 1 tablet by mouth 2 times daily as needed for Anxiety for up to 120 days. 60 tablet 3    ondansetron (ZOFRAN-ODT) 4 MG disintegrating tablet Take 1 tablet by mouth every 8 hours as needed for Nausea 21 tablet 1    albuterol sulfate HFA (PROVENTIL;VENTOLIN;PROAIR) 108 (90 Base) MCG/ACT inhaler USE 2 INHALATIONS EVERY 6 HOURS AS NEEDED FOR WHEEZING      famotidine (PEPCID) 40 MG tablet Take 40 mg by mouth 2 times daily      levalbuterol (XOPENEX) 0.63 MG/3ML nebulization Inhale 0.63 mg into the lungs every 6 hours as needed      mupirocin (BACTROBAN) 2 % ointment Apply topically 2 times daily      oxybutynin (DITROPAN) 5 MG tablet 1-2 tabs Qhs only      potassium chloride (KLOR-CON M) 10 MEQ extended release tablet Take 10 mEq by mouth 2 times daily      sucralfate (CARAFATE) 1 GM tablet Take 1 g by mouth 4 times daily as needed       No current facility-administered medications for this visit. Allergies:   Allergies   Allergen Reactions    Aspirin Other (See Comments)     Painful stomach-- Hx of PUD    Levothyroxine Hives    Morphine Other (See Comments)     Hallucinations       Surgical History:  Past Surgical History:   Procedure Laterality Date    BREAST BIOPSY Left     CHOLECYSTECTOMY, LAPAROSCOPIC  2001    COLONOSCOPY N/A 12/28/2017    COLONOSCOPY/ 19 performed by Ghazala Jacobo MD at [facility] ENDOSCOPY    COLONOSCOPY  1/22/2021         COLONOSCOPY N/A 1/22/2021    COLONOSCOPY/ BMI 20 performed by Emma Porter MD at [address] [place] [address]    ERCP  06/2016    GI  09/12/2016    Whipple    GI  2/2012    bowel obstruction     GI  4/2016 at [facility]    colonoscopy    HERNIA REPAIR  2014 at [facility]    mid abd    HYSTERECTOMY (CERVIX STATUS UNKNOWN)  [address] Right 2008    R FOOT SURG     HEEL SPUR    UPPER GASTROINTESTINAL ENDOSCOPY  1/22/2021         UPPER GASTROINTESTINAL ENDOSCOPY  08/2016    failed ampullectomy       Family History:  Family History   Problem Relation Age of Onset    Lung Disease Mother     Cancer Father         prostate    Cancer Sister         colon cancer    Cancer Brother        Social History:   Social History     Social History Narrative    Not on file      Social History     Socioeconomic History    Marital status:      Spouse name: Not on file    Number of children: Not on file    Years of education: Not on file    Highest education level: Not on file   Occupational History    Not on file   Tobacco Use    Smoking status: Never    Smokeless tobacco: Never   Vaping Use    Vaping Use: Never used   Substance and Sexual Activity    Alcohol use: No     Alcohol/week: 0.0 standard drinks    Drug use: No    Sexual activity: Not on file   Other Topics Concern    Not on file   Social History Narrative    Not on file     Social Determinants of Health     Financial Resource Strain: Low Risk     Difficulty of Paying Living Expenses: Not hard at all   Food Insecurity: No Food Insecurity    Worried About Running Out of Food in the Last Year: Never true    Ran Out of Food in the Last Year: Never true   Transportation Needs: Not on file   Physical Activity: Not on file   Stress: Not on file   Social Connections: Not on file   Intimate Partner Violence: Not on file   Housing Stability: Not on file         ROS  Review of Systems   Constitutional:  Negative for chills and fever. Respiratory:  Negative for shortness of breath. Cardiovascular:  Negative for chest pain. Gastrointestinal:  Negative for abdominal pain and nausea. Genitourinary:  Negative for dysuria, frequency, hematuria and urgency.        /62 (Site: Left Upper Arm)   Pulse 75   Temp 97 °F (36.1 °C) (Temporal)   Resp 16   Ht 5' 1\" (1.549 m)   Wt 113 lb 9.6 oz (51.5 kg)   SpO2 99%   BMI 21.46 kg/m²   Body mass index is 21.46 kg/m². Physical Exam    Physical Exam  Vitals and nursing note reviewed. Constitutional:       Appearance: Normal appearance. She is not ill-appearing. HENT:      Head: Normocephalic. Cardiovascular:      Rate and Rhythm: Normal rate and regular rhythm. Heart sounds: Normal heart sounds. Pulmonary:      Effort: Pulmonary effort is normal.      Breath sounds: Normal breath sounds. Abdominal:      Palpations: Abdomen is soft. Tenderness: There is no abdominal tenderness. Skin:     Comments: Skin tear noted on the left lower leg without active bleeding or discharge. Appears to be healing. No surrounding erythema, swelling or sign of infection. Neurological:      Mental Status: She is alert. Psychiatric:         Mood and Affect: Mood normal.         Behavior: Behavior normal.         Thought Content: Thought content normal.       Component      Latest Ref Rng & Units 11/17/2022          12:02 PM   Color, Urine, POC       yellow   Clarity, Urine, POC       clear   Glucose, Urine, POC      Negative Negative   Bilirubin, Urine, POC      Negative Negative   Ketones, Urine, POC      Negative Negative   Specific Gravity, Urine, POC      1.001 - 1.035 1.020   Blood, Urine, POC      Negative negative   pH, Urine, POC      4.6 - 8.0 5.0   Protein, Urine, POC      Negative 1+   Urobilinogen, POC       0.2   Nitrate, Urine, POC      Negative negative   Leukocyte Esterase, Urine, POC      Negative Negative       ASSESSMENT & PLAN    ICD-10-CM    1. Acute cystitis without hematuria  N30.00 AMB POC URINALYSIS DIP STICK AUTO W/O MICRO      2. Skin tear of left lower leg without complication, initial encounter  S81.812A       3. Generalized pruritus  L29.9 hydrOXYzine HCl (ATARAX) 25 MG tablet      4. Abdominal cramping  R10.9 Hyoscyamine Sulfate SL 0.125 MG SUBL           1. Acute cystitis without hematuria  *Recently treated by urgent care provider. Symptoms seem to have resolved.   UA obtained today is unrevealing. She completed course of Keflex. *Continue to drink plenty of water. Observation for now. Return for any recurrent symptoms.  - AMB POC URINALYSIS DIP STICK AUTO W/O MICRO    2. Skin tear of left lower leg without complication, initial encounter  *Appears to be healing well. Continue washing the area with soap and water daily. To apply a small layer of Vaseline with a bandage daily until healed. 3. Generalized pruritus  *Stable issue. Patient requesting refill on Atarax to continue for as needed use. - hydrOXYzine HCl (ATARAX) 25 MG tablet; TAKE 1 TABLET THREE TIMES A DAY AS NEEDED FOR ITCHING  Dispense: 30 tablet; Refill: 2    4. Abdominal cramping  *Stable issue. Patient requesting refill on hyoscyamine.  - Hyoscyamine Sulfate SL 0.125 MG SUBL; Place 0.125 mg under the tongue every 4 hours as needed (abdominal cramping)  Dispense: 120 each; Refill: 1      Orders Placed This Encounter   Procedures    AMB POC URINALYSIS DIP STICK AUTO W/O MICRO     I have reviewed the patient's past medical history, social history and family history and vitals. We have discussed treatment plan and follow up and given patient instructions. Patient's questions are answered and we will follow up as indicated. Dictated using voice recognition software. Proof read but unrecognized errors may exist.    Follow-up and Dispositions    Return As previously scheduled, 12/2022, with Dr. Sri Alaniz PA-C

## 2022-12-06 ENCOUNTER — NURSE ONLY (OUTPATIENT)
Dept: FAMILY MEDICINE CLINIC | Facility: CLINIC | Age: 82
End: 2022-12-06

## 2022-12-06 ENCOUNTER — OFFICE VISIT (OUTPATIENT)
Dept: FAMILY MEDICINE CLINIC | Facility: CLINIC | Age: 82
End: 2022-12-06
Payer: MEDICARE

## 2022-12-06 VITALS
HEIGHT: 61 IN | DIASTOLIC BLOOD PRESSURE: 46 MMHG | OXYGEN SATURATION: 97 % | BODY MASS INDEX: 21.14 KG/M2 | SYSTOLIC BLOOD PRESSURE: 135 MMHG | RESPIRATION RATE: 16 BRPM | HEART RATE: 75 BPM | TEMPERATURE: 98.1 F | WEIGHT: 112 LBS

## 2022-12-06 DIAGNOSIS — F41.9 ANXIETY: ICD-10-CM

## 2022-12-06 DIAGNOSIS — E55.9 VITAMIN D DEFICIENCY, UNSPECIFIED: ICD-10-CM

## 2022-12-06 DIAGNOSIS — F51.04 CHRONIC INSOMNIA: ICD-10-CM

## 2022-12-06 DIAGNOSIS — G20 PARKINSON'S DISEASE (HCC): ICD-10-CM

## 2022-12-06 DIAGNOSIS — D49.0 PERI-AMPULLARY NEOPLASM: ICD-10-CM

## 2022-12-06 DIAGNOSIS — R10.84 GENERALIZED ABDOMINAL PAIN: ICD-10-CM

## 2022-12-06 DIAGNOSIS — Z87.898 HISTORY OF ABDOMINAL PAIN: ICD-10-CM

## 2022-12-06 DIAGNOSIS — R30.0 DYSURIA: Primary | ICD-10-CM

## 2022-12-06 DIAGNOSIS — R30.0 DYSURIA: ICD-10-CM

## 2022-12-06 LAB
ALBUMIN SERPL-MCNC: 4.1 G/DL (ref 3.2–4.6)
ALBUMIN/GLOB SERPL: 1.2 (ref 0.4–1.6)
ALP SERPL-CCNC: 117 U/L (ref 50–136)
ALT SERPL-CCNC: 22 U/L (ref 12–65)
ANION GAP SERPL CALC-SCNC: ABNORMAL MMOL/L (ref 2–11)
AST SERPL-CCNC: 18 U/L (ref 15–37)
BASOPHILS # BLD: 0.1 K/UL (ref 0–0.2)
BASOPHILS NFR BLD: 1 % (ref 0–2)
BILIRUB SERPL-MCNC: 0.3 MG/DL (ref 0.2–1.1)
BUN SERPL-MCNC: 17 MG/DL (ref 8–23)
CALCIUM SERPL-MCNC: 9.8 MG/DL (ref 8.3–10.4)
CHLORIDE SERPL-SCNC: 108 MMOL/L (ref 101–110)
CO2 SERPL-SCNC: 33 MMOL/L (ref 21–32)
CREAT SERPL-MCNC: 1.1 MG/DL (ref 0.6–1)
DIFFERENTIAL METHOD BLD: ABNORMAL
EOSINOPHIL # BLD: 0.2 K/UL (ref 0–0.8)
EOSINOPHIL NFR BLD: 2 % (ref 0.5–7.8)
ERYTHROCYTE [DISTWIDTH] IN BLOOD BY AUTOMATED COUNT: 14.3 % (ref 11.9–14.6)
GLOBULIN SER CALC-MCNC: 3.4 G/DL (ref 2.8–4.5)
GLUCOSE SERPL-MCNC: 80 MG/DL (ref 65–100)
HCT VFR BLD AUTO: 40.7 % (ref 35.8–46.3)
HGB BLD-MCNC: 12.7 G/DL (ref 11.7–15.4)
IMM GRANULOCYTES # BLD AUTO: 0 K/UL (ref 0–0.5)
IMM GRANULOCYTES NFR BLD AUTO: 0 % (ref 0–5)
LIPASE SERPL-CCNC: 26 U/L (ref 73–393)
LYMPHOCYTES # BLD: 1.6 K/UL (ref 0.5–4.6)
LYMPHOCYTES NFR BLD: 24 % (ref 13–44)
MCH RBC QN AUTO: 28.8 PG (ref 26.1–32.9)
MCHC RBC AUTO-ENTMCNC: 31.2 G/DL (ref 31.4–35)
MCV RBC AUTO: 92.3 FL (ref 82–102)
MONOCYTES # BLD: 0.5 K/UL (ref 0.1–1.3)
MONOCYTES NFR BLD: 7 % (ref 4–12)
NEUTS SEG # BLD: 4.4 K/UL (ref 1.7–8.2)
NEUTS SEG NFR BLD: 65 % (ref 43–78)
NRBC # BLD: 0 K/UL (ref 0–0.2)
PLATELET # BLD AUTO: 197 K/UL (ref 150–450)
PMV BLD AUTO: 13.7 FL (ref 9.4–12.3)
POTASSIUM SERPL-SCNC: 4.1 MMOL/L (ref 3.5–5.1)
PROT SERPL-MCNC: 7.5 G/DL (ref 6.3–8.2)
RBC # BLD AUTO: 4.41 M/UL (ref 4.05–5.2)
SODIUM SERPL-SCNC: 140 MMOL/L (ref 133–143)
WBC # BLD AUTO: 6.8 K/UL (ref 4.3–11.1)

## 2022-12-06 PROCEDURE — 1123F ACP DISCUSS/DSCN MKR DOCD: CPT | Performed by: FAMILY MEDICINE

## 2022-12-06 PROCEDURE — G8420 CALC BMI NORM PARAMETERS: HCPCS | Performed by: FAMILY MEDICINE

## 2022-12-06 PROCEDURE — 81002 URINALYSIS NONAUTO W/O SCOPE: CPT | Performed by: FAMILY MEDICINE

## 2022-12-06 PROCEDURE — G8427 DOCREV CUR MEDS BY ELIG CLIN: HCPCS | Performed by: FAMILY MEDICINE

## 2022-12-06 PROCEDURE — 99214 OFFICE O/P EST MOD 30 MIN: CPT | Performed by: FAMILY MEDICINE

## 2022-12-06 PROCEDURE — G8399 PT W/DXA RESULTS DOCUMENT: HCPCS | Performed by: FAMILY MEDICINE

## 2022-12-06 PROCEDURE — 1036F TOBACCO NON-USER: CPT | Performed by: FAMILY MEDICINE

## 2022-12-06 PROCEDURE — 1090F PRES/ABSN URINE INCON ASSESS: CPT | Performed by: FAMILY MEDICINE

## 2022-12-06 PROCEDURE — G8484 FLU IMMUNIZE NO ADMIN: HCPCS | Performed by: FAMILY MEDICINE

## 2022-12-06 RX ORDER — ZOLPIDEM TARTRATE 10 MG/1
10 TABLET ORAL NIGHTLY PRN
Qty: 30 TABLET | Refills: 5 | Status: SHIPPED | OUTPATIENT
Start: 2022-12-06 | End: 2022-12-06 | Stop reason: SDUPTHER

## 2022-12-06 RX ORDER — DIAZEPAM 10 MG/1
10 TABLET ORAL 2 TIMES DAILY PRN
Qty: 60 TABLET | Refills: 3 | Status: SHIPPED | OUTPATIENT
Start: 2022-12-06 | End: 2022-12-06 | Stop reason: SDUPTHER

## 2022-12-06 RX ORDER — OXYCODONE HYDROCHLORIDE 5 MG/1
5 TABLET ORAL EVERY 8 HOURS PRN
Qty: 20 TABLET | Refills: 0 | Status: SHIPPED | OUTPATIENT
Start: 2022-12-06 | End: 2022-12-13

## 2022-12-06 RX ORDER — DIAZEPAM 10 MG/1
10 TABLET ORAL 2 TIMES DAILY PRN
Qty: 60 TABLET | Refills: 3 | Status: SHIPPED | OUTPATIENT
Start: 2022-12-06 | End: 2023-04-05

## 2022-12-06 RX ORDER — OXYCODONE HYDROCHLORIDE 5 MG/1
5 TABLET ORAL EVERY 8 HOURS PRN
Qty: 20 TABLET | Refills: 0 | Status: SHIPPED | OUTPATIENT
Start: 2022-12-06 | End: 2022-12-06 | Stop reason: SDUPTHER

## 2022-12-06 RX ORDER — ZOLPIDEM TARTRATE 10 MG/1
10 TABLET ORAL NIGHTLY PRN
Qty: 30 TABLET | Refills: 5 | Status: SHIPPED | OUTPATIENT
Start: 2022-12-06 | End: 2023-06-04

## 2022-12-06 ASSESSMENT — ENCOUNTER SYMPTOMS
ABDOMINAL PAIN: 1
NAUSEA: 1

## 2022-12-06 ASSESSMENT — PATIENT HEALTH QUESTIONNAIRE - PHQ9
SUM OF ALL RESPONSES TO PHQ QUESTIONS 1-9: 2
1. LITTLE INTEREST OR PLEASURE IN DOING THINGS: 1
SUM OF ALL RESPONSES TO PHQ QUESTIONS 1-9: 2
SUM OF ALL RESPONSES TO PHQ9 QUESTIONS 1 & 2: 2
2. FEELING DOWN, DEPRESSED OR HOPELESS: 1

## 2022-12-06 NOTE — PROGRESS NOTES
Subjective:      Patient ID: Monica Day is a 78 y.o. female. HPI  Here for follow up regarding urinary tract infection that she had last month requiring visit to urgent care. Still has some slight dysuria but also is having some abdominal discomfort at times. 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 She has been eating okay does report some decreased appetite though at times. Patient does have to take some medication for pain but typically only half of a tablet to help with that.   Past Medical History:   Diagnosis Date    Arthritis     oa-  knees and elbows    Asthma     proair and neb     Cancer ([facility] dx---9/2016    bile duct --pancreatic----whipple-    Cancer ([facility]     skin- basal cell- removed     Chronic pain     GERD (gastroesophageal reflux disease)     controlled with meds    Hypotension     \"off and on\"    IBS (irritable bowel syndrome)     Insomnia     Macular degeneration     Mass of ampulla of Vater     Menopause     Orthostatic hypotension     followed by Dr. Ivanna Calvert at [facility] cardio     Parkinson's disease [facility]) dx 2009    followed by [facility] Neurology ; on med for control     Psychiatric disorder     anxiety: PRN meds    PUD (peptic ulcer disease)     SVT (supraventricular tachycardia) ([facility]     Syncope and collapse     last episode 2 months ago (noted 1/20/21)    Unspecified adverse effect of anesthesia     delayed awakening---     Weight loss        Allergies   Allergen Reactions    Aspirin Other (See Comments)     Painful stomach-- Hx of PUD    Levothyroxine Hives    Morphine Other (See Comments)     Hallucinations       Current Outpatient Medications   Medication Sig Dispense Refill    Hyoscyamine Sulfate SL 0.125 MG SUBL Place 0.125 mg under the tongue every 4 hours as needed (abdominal cramping) 120 each 1    hydrOXYzine HCl (ATARAX) 25 MG tablet TAKE 1 TABLET THREE TIMES A DAY AS NEEDED FOR ITCHING 30 tablet 2    zolpidem (AMBIEN) 10 MG tablet nightly as needed. carbidopa-levodopa (SINEMET)  MG per tablet Take 1 tablet by mouth 4 times daily 360 tablet 3    carbidopa-levodopa (SINEMET CR)  MG per extended release tablet Take 1 tablet by mouth nightly 90 tablet 3    fludrocortisone (FLORINEF) 0.1 MG tablet TAKE 1 TABLET DAILY 90 tablet 3    midodrine (PROAMATINE) 10 MG tablet TAKE 1 TABLET THREE TIMES A  tablet 3    donepezil (ARICEPT) 10 MG tablet Take 1 tablet by mouth nightly 90 tablet 3    memantine (NAMENDA) 10 MG tablet 1/2 tab BID for 2 weeks then increase to 1 tab  tablet 3    diazePAM (VALIUM) 10 MG tablet Take 1 tablet by mouth 2 times daily as needed for Anxiety for up to 120 days. 60 tablet 3    ondansetron (ZOFRAN-ODT) 4 MG disintegrating tablet Take 1 tablet by mouth every 8 hours as needed for Nausea 21 tablet 1    albuterol sulfate HFA (PROVENTIL;VENTOLIN;PROAIR) 108 (90 Base) MCG/ACT inhaler USE 2 INHALATIONS EVERY 6 HOURS AS NEEDED FOR WHEEZING      famotidine (PEPCID) 40 MG tablet Take 40 mg by mouth 2 times daily      levalbuterol (XOPENEX) 0.63 MG/3ML nebulization Inhale 0.63 mg into the lungs every 6 hours as needed      mupirocin (BACTROBAN) 2 % ointment Apply topically 2 times daily      oxybutynin (DITROPAN) 5 MG tablet 1-2 tabs Qhs only      potassium chloride (KLOR-CON M) 10 MEQ extended release tablet Take 10 mEq by mouth 2 times daily      sucralfate (CARAFATE) 1 GM tablet Take 1 g by mouth 4 times daily as needed       No current facility-administered medications for this visit. Social History     Tobacco Use    Smoking status: Never    Smokeless tobacco: Never   Vaping Use    Vaping Use: Never used   Substance Use Topics    Alcohol use: No     Alcohol/week: 0.0 standard drinks    Drug use: No     Review of Systems   Gastrointestinal:  Positive for abdominal pain and nausea. Neurological:  Positive for tremors.      Blood pressure (!) 135/46, pulse 75, temperature 98.1 °F (36.7 °C), temperature source Temporal, resp. rate 16, height 5' 1\" (1.549 m), weight 112 lb (50.8 kg), SpO2 97 %. Objective:   Physical Exam  Vitals reviewed. Constitutional:       General: She is not in acute distress. Appearance: Normal appearance. Cardiovascular:      Rate and Rhythm: Normal rate and regular rhythm. Pulses: Normal pulses. Pulmonary:      Effort: Pulmonary effort is normal.      Breath sounds: Normal breath sounds. Neurological:      General: No focal deficit present. Mental Status: She is alert and oriented to person, place, and time. Assessment / Plan:       Elisabet was seen today for abdominal pain, anxiety and dysuria. Diagnoses and all orders for this visit:    Dysuria  -     AMB POC URINALYSIS DIP STICK MANUAL W/O MICRO  -     Culture, Urine; Future    Anxiety  -     Discontinue: diazePAM (VALIUM) 10 MG tablet; Take 1 tablet by mouth 2 times daily as needed for Anxiety for up to 120 days. -     diazePAM (VALIUM) 10 MG tablet; Take 1 tablet by mouth 2 times daily as needed for Anxiety for up to 120 days. Chronic insomnia  -     Discontinue: zolpidem (AMBIEN) 10 MG tablet; Take 1 tablet by mouth nightly as needed for Sleep for up to 180 days. -     zolpidem (AMBIEN) 10 MG tablet; Take 1 tablet by mouth nightly as needed for Sleep for up to 180 days. History of abdominal pain  -     Discontinue: oxyCODONE (ROXICODONE) 5 MG immediate release tablet; Take 1 tablet by mouth every 8 hours as needed for Pain for up to 7 days. Intended supply: 7 days. Take lowest dose possible to manage pain  -     oxyCODONE (ROXICODONE) 5 MG immediate release tablet; Take 1 tablet by mouth every 8 hours as needed for Pain for up to 7 days. Intended supply: 7 days. Take lowest dose possible to manage pain    Parkinson's disease ([facility]    Vitamin D deficiency, unspecified    Generalized abdominal pain  -     Lipase;  Future  -     CBC with Auto Differential; Future  - Comprehensive Metabolic Panel; Future    Ilene-ampullary neoplasm  -     Lipase; Future  -     CBC with Auto Differential; Future  -     Comprehensive Metabolic Panel; Future       Repeat a urine culture because of the urinary tract infection last month. I have reviewed the patients controlled substance prescription history, as maintained in the [place] prescription monitoring program, so that the prescription(s) for a  controlled substance can be given. I did query the [facility] prescribers database and no suspicious activity noted. We will check some additional labs and notify her of the results. Follow-up and Dispositions    Return in about 3 months (around 3/6/2023), or if symptoms worsen or fail to improve. Dictated using voice recognition software.  Proofread, but unrecognized errors may exist.

## 2022-12-09 ENCOUNTER — TELEPHONE (OUTPATIENT)
Dept: FAMILY MEDICINE CLINIC | Facility: CLINIC | Age: 82
End: 2022-12-09

## 2022-12-09 LAB
BACTERIA SPEC CULT: ABNORMAL
SERVICE CMNT-IMP: ABNORMAL

## 2022-12-09 RX ORDER — SULFAMETHOXAZOLE AND TRIMETHOPRIM 800; 160 MG/1; MG/1
1 TABLET ORAL 2 TIMES DAILY
Qty: 14 TABLET | Refills: 0 | Status: SHIPPED | OUTPATIENT
Start: 2022-12-09 | End: 2022-12-16

## 2022-12-09 NOTE — TELEPHONE ENCOUNTER
Let patient know that her urine culture did come back positive. Would suggest going on Bactrim DS twice daily to cover for the urinary tract infection. Other labs showed her kidney function, liver enzymes and pancreas enzymes are normal.  White blood count was normal and hemoglobin is good at 12.7.   Sent in prescription for:     Requested Prescriptions     Signed Prescriptions Disp Refills    sulfamethoxazole-trimethoprim (BACTRIM DS;SEPTRA DS) 800-160 MG per tablet 14 tablet 0     Sig: Take 1 tablet by mouth 2 times daily for 7 days     Authorizing Provider: Renay Akbar

## 2022-12-20 ENCOUNTER — TELEPHONE (OUTPATIENT)
Dept: FAMILY MEDICINE CLINIC | Facility: CLINIC | Age: 82
End: 2022-12-20

## 2022-12-20 NOTE — TELEPHONE ENCOUNTER
Sent in prescription for:     Requested Prescriptions     Signed Prescriptions Disp Refills    nirmatrelvir/ritonavir (PAXLOVID) 20 x 150 MG & 10 x 100MG TBPK 30 tablet 0     Sig: Take 3 tablets (two 150 mg nirmatrelvir and one 100 mg ritonavir tablets) by mouth every 12 hours for 5 days.   Normal renal function     Authorizing Provider: Srikanth Molina

## 2023-01-17 DIAGNOSIS — L29.9 GENERALIZED PRURITUS: ICD-10-CM

## 2023-01-17 RX ORDER — SUCRALFATE 1 G/1
TABLET ORAL
Qty: 360 TABLET | Refills: 3 | Status: SHIPPED | OUTPATIENT
Start: 2023-01-17

## 2023-01-17 RX ORDER — HYDROXYZINE HYDROCHLORIDE 25 MG/1
TABLET, FILM COATED ORAL
Qty: 90 TABLET | Refills: 11 | Status: SHIPPED | OUTPATIENT
Start: 2023-01-17

## 2023-03-03 ENCOUNTER — TELEPHONE (OUTPATIENT)
Dept: FAMILY MEDICINE CLINIC | Facility: CLINIC | Age: 83
End: 2023-03-03

## 2023-03-03 NOTE — TELEPHONE ENCOUNTER
Surgery clearance from [facility] for oral and facial surgery. Placed in primitivo in tray.

## 2023-03-20 RX ORDER — POTASSIUM CHLORIDE 750 MG/1
TABLET, EXTENDED RELEASE ORAL
Qty: 180 TABLET | Refills: 3 | Status: SHIPPED | OUTPATIENT
Start: 2023-03-20

## 2023-04-04 ENCOUNTER — OFFICE VISIT (OUTPATIENT)
Dept: FAMILY MEDICINE CLINIC | Facility: CLINIC | Age: 83
End: 2023-04-04
Payer: MEDICARE

## 2023-04-04 VITALS
HEIGHT: 61 IN | SYSTOLIC BLOOD PRESSURE: 129 MMHG | OXYGEN SATURATION: 97 % | RESPIRATION RATE: 16 BRPM | DIASTOLIC BLOOD PRESSURE: 54 MMHG | BODY MASS INDEX: 21.49 KG/M2 | WEIGHT: 113.8 LBS | HEART RATE: 75 BPM | TEMPERATURE: 97.7 F

## 2023-04-04 DIAGNOSIS — E55.9 VITAMIN D DEFICIENCY, UNSPECIFIED: ICD-10-CM

## 2023-04-04 DIAGNOSIS — C17.0 MALIGNANT NEOPLASM OF DUODENUM (HCC): ICD-10-CM

## 2023-04-04 DIAGNOSIS — J45.909 ASTHMA, UNSPECIFIED ASTHMA SEVERITY, UNSPECIFIED WHETHER COMPLICATED, UNSPECIFIED WHETHER PERSISTENT: Primary | ICD-10-CM

## 2023-04-04 DIAGNOSIS — G20 PARKINSON'S DISEASE (HCC): ICD-10-CM

## 2023-04-04 DIAGNOSIS — R53.82 CHRONIC FATIGUE, UNSPECIFIED: ICD-10-CM

## 2023-04-04 DIAGNOSIS — K21.9 GASTROESOPHAGEAL REFLUX DISEASE, UNSPECIFIED WHETHER ESOPHAGITIS PRESENT: ICD-10-CM

## 2023-04-04 DIAGNOSIS — R32 URINARY INCONTINENCE, UNSPECIFIED TYPE: ICD-10-CM

## 2023-04-04 DIAGNOSIS — D49.0 PERI-AMPULLARY NEOPLASM: ICD-10-CM

## 2023-04-04 DIAGNOSIS — N39.0 URINARY TRACT INFECTION WITHOUT HEMATURIA, SITE UNSPECIFIED: ICD-10-CM

## 2023-04-04 DIAGNOSIS — Z78.0 ASYMPTOMATIC MENOPAUSAL STATE: ICD-10-CM

## 2023-04-04 PROBLEM — F22 EKBOM'S DELUSIONAL PARASITOSIS (HCC): Status: RESOLVED | Noted: 2019-08-19 | Resolved: 2023-04-04

## 2023-04-04 LAB
BILIRUBIN, URINE, POC: NEGATIVE
BLOOD URINE, POC: NORMAL
GLUCOSE URINE, POC: NEGATIVE
KETONES, URINE, POC: NEGATIVE
LEUKOCYTE ESTERASE, URINE, POC: NORMAL
NITRITE, URINE, POC: POSITIVE
PH, URINE, POC: 5 (ref 4.6–8)
PROTEIN,URINE, POC: NORMAL
SPECIFIC GRAVITY, URINE, POC: 1.01 (ref 1–1.03)
URINALYSIS CLARITY, POC: CLEAR
URINALYSIS COLOR, POC: YELLOW
UROBILINOGEN, POC: NORMAL

## 2023-04-04 PROCEDURE — 1036F TOBACCO NON-USER: CPT | Performed by: FAMILY MEDICINE

## 2023-04-04 PROCEDURE — 1090F PRES/ABSN URINE INCON ASSESS: CPT | Performed by: FAMILY MEDICINE

## 2023-04-04 PROCEDURE — G8399 PT W/DXA RESULTS DOCUMENT: HCPCS | Performed by: FAMILY MEDICINE

## 2023-04-04 PROCEDURE — 99214 OFFICE O/P EST MOD 30 MIN: CPT | Performed by: FAMILY MEDICINE

## 2023-04-04 PROCEDURE — 0509F URINE INCON PLAN DOCD: CPT | Performed by: FAMILY MEDICINE

## 2023-04-04 PROCEDURE — G8420 CALC BMI NORM PARAMETERS: HCPCS | Performed by: FAMILY MEDICINE

## 2023-04-04 PROCEDURE — 1123F ACP DISCUSS/DSCN MKR DOCD: CPT | Performed by: FAMILY MEDICINE

## 2023-04-04 PROCEDURE — 81003 URINALYSIS AUTO W/O SCOPE: CPT | Performed by: FAMILY MEDICINE

## 2023-04-04 PROCEDURE — G8427 DOCREV CUR MEDS BY ELIG CLIN: HCPCS | Performed by: FAMILY MEDICINE

## 2023-04-04 RX ORDER — LEVALBUTEROL INHALATION SOLUTION 0.63 MG/3ML
0.63 SOLUTION RESPIRATORY (INHALATION) EVERY 6 HOURS PRN
Qty: 120 EACH | Refills: 5 | Status: SHIPPED | OUTPATIENT
Start: 2023-04-04

## 2023-04-04 RX ORDER — FAMOTIDINE 40 MG/1
40 TABLET, FILM COATED ORAL 2 TIMES DAILY
Qty: 30 TABLET | Refills: 5 | Status: SHIPPED | OUTPATIENT
Start: 2023-04-04

## 2023-04-04 RX ORDER — OXYBUTYNIN CHLORIDE 5 MG/1
5 TABLET ORAL 2 TIMES DAILY
Qty: 90 TABLET | Refills: 1 | Status: SHIPPED | OUTPATIENT
Start: 2023-04-04

## 2023-04-04 RX ORDER — TRAMADOL HYDROCHLORIDE 50 MG/1
50 TABLET ORAL EVERY 6 HOURS PRN
Qty: 30 TABLET | Refills: 2 | Status: SHIPPED | OUTPATIENT
Start: 2023-04-04 | End: 2023-04-09

## 2023-04-04 RX ORDER — ALBUTEROL SULFATE 90 UG/1
2 AEROSOL, METERED RESPIRATORY (INHALATION) EVERY 6 HOURS PRN
Qty: 18 G | Refills: 5 | Status: SHIPPED | OUTPATIENT
Start: 2023-04-04

## 2023-04-04 RX ORDER — CIPROFLOXACIN 500 MG/1
500 TABLET, FILM COATED ORAL 2 TIMES DAILY
Qty: 14 TABLET | Refills: 0 | Status: SHIPPED | OUTPATIENT
Start: 2023-04-04 | End: 2023-04-11

## 2023-04-04 SDOH — ECONOMIC STABILITY: INCOME INSECURITY: HOW HARD IS IT FOR YOU TO PAY FOR THE VERY BASICS LIKE FOOD, HOUSING, MEDICAL CARE, AND HEATING?: NOT HARD AT ALL

## 2023-04-04 SDOH — ECONOMIC STABILITY: HOUSING INSECURITY
IN THE LAST 12 MONTHS, WAS THERE A TIME WHEN YOU DID NOT HAVE A STEADY PLACE TO SLEEP OR SLEPT IN A SHELTER (INCLUDING NOW)?: NO

## 2023-04-04 SDOH — ECONOMIC STABILITY: FOOD INSECURITY: WITHIN THE PAST 12 MONTHS, YOU WORRIED THAT YOUR FOOD WOULD RUN OUT BEFORE YOU GOT MONEY TO BUY MORE.: NEVER TRUE

## 2023-04-04 SDOH — ECONOMIC STABILITY: FOOD INSECURITY: WITHIN THE PAST 12 MONTHS, THE FOOD YOU BOUGHT JUST DIDN'T LAST AND YOU DIDN'T HAVE MONEY TO GET MORE.: NEVER TRUE

## 2023-04-04 ASSESSMENT — PATIENT HEALTH QUESTIONNAIRE - PHQ9
SUM OF ALL RESPONSES TO PHQ QUESTIONS 1-9: 0
SUM OF ALL RESPONSES TO PHQ9 QUESTIONS 1 & 2: 0
2. FEELING DOWN, DEPRESSED OR HOPELESS: 0
1. LITTLE INTEREST OR PLEASURE IN DOING THINGS: 0
SUM OF ALL RESPONSES TO PHQ QUESTIONS 1-9: 0

## 2023-04-04 ASSESSMENT — ANXIETY QUESTIONNAIRES
7. FEELING AFRAID AS IF SOMETHING AWFUL MIGHT HAPPEN: 0
GAD7 TOTAL SCORE: 0
2. NOT BEING ABLE TO STOP OR CONTROL WORRYING: 0
6. BECOMING EASILY ANNOYED OR IRRITABLE: 0
4. TROUBLE RELAXING: 0
IF YOU CHECKED OFF ANY PROBLEMS ON THIS QUESTIONNAIRE, HOW DIFFICULT HAVE THESE PROBLEMS MADE IT FOR YOU TO DO YOUR WORK, TAKE CARE OF THINGS AT HOME, OR GET ALONG WITH OTHER PEOPLE: NOT DIFFICULT AT ALL
1. FEELING NERVOUS, ANXIOUS, OR ON EDGE: 0
3. WORRYING TOO MUCH ABOUT DIFFERENT THINGS: 0
5. BEING SO RESTLESS THAT IT IS HARD TO SIT STILL: 0

## 2023-04-04 ASSESSMENT — ENCOUNTER SYMPTOMS: ABDOMINAL PAIN: 1

## 2023-04-04 NOTE — PROGRESS NOTES
Subjective:      Patient ID: Lindy Marte is a 78 y.o. female. HPI  Patient comes in today for follow-up on her medication and she has had some increased dysuria in the last few days. Was treated at urgent care in February for UTI. Patient does have some severe abdominal pain at times and she is status post surgery or a bile duct or pancreatic mass with a Whipple procedure.   Past Medical History:   Diagnosis Date    Arthritis     oa-  knees and elbows    Asthma     proair and neb     Cancer ([facility] dx---9/2016    bile duct --pancreatic----whipple-    [facility] ([facility]     skin- basal cell- removed     Chronic pain     GERD (gastroesophageal reflux disease)     controlled with meds    Hypotension     \"off and on\"    IBS (irritable bowel syndrome)     Insomnia     Macular degeneration     Mass of ampulla of Vater     Menopause     Orthostatic hypotension     followed by Dr. Angela Apodaca at [facility] cardio     Parkinson's disease [facility]) dx 2009    followed by [facility] Neurology ; on med for control     Psychiatric disorder     anxiety: PRN meds    PUD (peptic ulcer disease)     SVT (supraventricular tachycardia) ([facility]     Syncope and collapse     last episode 2 months ago (noted 1/20/21)    Unspecified adverse effect of anesthesia     delayed awakening---     Weight loss        Allergies   Allergen Reactions    Aspirin Other (See Comments)     Painful stomach-- Hx of PUD    Levothyroxine Hives    Morphine Other (See Comments)     Hallucinations       Current Outpatient Medications   Medication Sig Dispense Refill    albuterol sulfate HFA (PROVENTIL;VENTOLIN;PROAIR) 108 (90 Base) MCG/ACT inhaler Inhale 2 puffs into the lungs every 6 hours as needed for Wheezing USE 2 INHALATIONS EVERY 6 HOURS AS NEEDED FOR WHEEZING 18 g 5    famotidine (PEPCID) 40 MG tablet Take 1 tablet by mouth 2 times daily 30 tablet 5    levalbuterol (XOPENEX) 0.63 MG/3ML nebulization Take 3 mLs by nebulization every 6 hours as needed for

## 2023-05-11 ENCOUNTER — TELEPHONE (OUTPATIENT)
Age: 83
End: 2023-05-11

## 2023-06-27 ENCOUNTER — OFFICE VISIT (OUTPATIENT)
Dept: NEUROLOGY | Age: 83
End: 2023-06-27
Payer: MEDICARE

## 2023-06-27 VITALS
BODY MASS INDEX: 20.92 KG/M2 | HEART RATE: 71 BPM | SYSTOLIC BLOOD PRESSURE: 153 MMHG | WEIGHT: 110.8 LBS | OXYGEN SATURATION: 98 % | HEIGHT: 61 IN | DIASTOLIC BLOOD PRESSURE: 65 MMHG

## 2023-06-27 DIAGNOSIS — G20 PARKINSON'S DISEASE (HCC): ICD-10-CM

## 2023-06-27 DIAGNOSIS — M25.552 ACUTE HIP PAIN, LEFT: ICD-10-CM

## 2023-06-27 DIAGNOSIS — R25.1 TREMOR: ICD-10-CM

## 2023-06-27 DIAGNOSIS — G20 COGNITIVE DEFICIT DUE TO PARKINSON'S DISEASE (HCC): ICD-10-CM

## 2023-06-27 DIAGNOSIS — G20 PARKINSON DISEASE (HCC): Primary | ICD-10-CM

## 2023-06-27 DIAGNOSIS — G90.3 NEUROLOGIC ORTHOSTATIC HYPOTENSION (HCC): ICD-10-CM

## 2023-06-27 DIAGNOSIS — G20 PSYCHOSIS DUE TO PARKINSON'S DISEASE (HCC): ICD-10-CM

## 2023-06-27 PROCEDURE — G8399 PT W/DXA RESULTS DOCUMENT: HCPCS | Performed by: PSYCHIATRY & NEUROLOGY

## 2023-06-27 PROCEDURE — 99215 OFFICE O/P EST HI 40 MIN: CPT | Performed by: PSYCHIATRY & NEUROLOGY

## 2023-06-27 PROCEDURE — 1036F TOBACCO NON-USER: CPT | Performed by: PSYCHIATRY & NEUROLOGY

## 2023-06-27 PROCEDURE — 1123F ACP DISCUSS/DSCN MKR DOCD: CPT | Performed by: PSYCHIATRY & NEUROLOGY

## 2023-06-27 PROCEDURE — G8420 CALC BMI NORM PARAMETERS: HCPCS | Performed by: PSYCHIATRY & NEUROLOGY

## 2023-06-27 PROCEDURE — 1090F PRES/ABSN URINE INCON ASSESS: CPT | Performed by: PSYCHIATRY & NEUROLOGY

## 2023-06-27 PROCEDURE — G8427 DOCREV CUR MEDS BY ELIG CLIN: HCPCS | Performed by: PSYCHIATRY & NEUROLOGY

## 2023-06-27 RX ORDER — CARBIDOPA AND LEVODOPA 50; 200 MG/1; MG/1
1 TABLET, EXTENDED RELEASE ORAL NIGHTLY
Qty: 90 TABLET | Refills: 3 | Status: SHIPPED | OUTPATIENT
Start: 2023-06-27

## 2023-06-27 ASSESSMENT — ENCOUNTER SYMPTOMS
CONSTIPATION: 1
BACK PAIN: 1

## 2023-07-17 ENCOUNTER — OFFICE VISIT (OUTPATIENT)
Dept: FAMILY MEDICINE CLINIC | Facility: CLINIC | Age: 83
End: 2023-07-17
Payer: MEDICARE

## 2023-07-17 ENCOUNTER — HOSPITAL ENCOUNTER (OUTPATIENT)
Dept: CT IMAGING | Age: 83
Discharge: HOME | End: 2023-07-20
Attending: FAMILY MEDICINE
Payer: MEDICARE

## 2023-07-17 VITALS
HEART RATE: 68 BPM | SYSTOLIC BLOOD PRESSURE: 135 MMHG | WEIGHT: 113 LBS | TEMPERATURE: 98.1 F | OXYGEN SATURATION: 99 % | BODY MASS INDEX: 21.34 KG/M2 | RESPIRATION RATE: 16 BRPM | DIASTOLIC BLOOD PRESSURE: 66 MMHG | HEIGHT: 61 IN

## 2023-07-17 DIAGNOSIS — R31.9 URINARY TRACT INFECTION WITH HEMATURIA, SITE UNSPECIFIED: Primary | ICD-10-CM

## 2023-07-17 DIAGNOSIS — R31.1 BENIGN ESSENTIAL MICROSCOPIC HEMATURIA: ICD-10-CM

## 2023-07-17 DIAGNOSIS — N39.0 URINARY TRACT INFECTION WITH HEMATURIA, SITE UNSPECIFIED: Primary | ICD-10-CM

## 2023-07-17 DIAGNOSIS — R10.9 LEFT FLANK PAIN: ICD-10-CM

## 2023-07-17 DIAGNOSIS — R31.9 URINARY TRACT INFECTION WITH HEMATURIA, SITE UNSPECIFIED: ICD-10-CM

## 2023-07-17 DIAGNOSIS — N39.0 URINARY TRACT INFECTION WITH HEMATURIA, SITE UNSPECIFIED: ICD-10-CM

## 2023-07-17 LAB
BILIRUBIN, URINE, POC: NEGATIVE
BLOOD URINE, POC: NEGATIVE
GLUCOSE URINE, POC: NEGATIVE
KETONES, URINE, POC: NEGATIVE
LEUKOCYTE ESTERASE, URINE, POC: NEGATIVE
NITRITE, URINE, POC: NEGATIVE
PH, URINE, POC: 6.5 (ref 4.6–8)
PROTEIN,URINE, POC: 300
SPECIFIC GRAVITY, URINE, POC: 1 (ref 1–1.03)
URINALYSIS CLARITY, POC: CLEAR
URINALYSIS COLOR, POC: YELLOW
UROBILINOGEN, POC: NORMAL

## 2023-07-17 PROCEDURE — G8427 DOCREV CUR MEDS BY ELIG CLIN: HCPCS | Performed by: FAMILY MEDICINE

## 2023-07-17 PROCEDURE — 1123F ACP DISCUSS/DSCN MKR DOCD: CPT | Performed by: FAMILY MEDICINE

## 2023-07-17 PROCEDURE — 99214 OFFICE O/P EST MOD 30 MIN: CPT | Performed by: FAMILY MEDICINE

## 2023-07-17 PROCEDURE — G8399 PT W/DXA RESULTS DOCUMENT: HCPCS | Performed by: FAMILY MEDICINE

## 2023-07-17 PROCEDURE — G8420 CALC BMI NORM PARAMETERS: HCPCS | Performed by: FAMILY MEDICINE

## 2023-07-17 PROCEDURE — 74176 CT ABD & PELVIS W/O CONTRAST: CPT

## 2023-07-17 PROCEDURE — 1090F PRES/ABSN URINE INCON ASSESS: CPT | Performed by: FAMILY MEDICINE

## 2023-07-17 PROCEDURE — 1036F TOBACCO NON-USER: CPT | Performed by: FAMILY MEDICINE

## 2023-07-17 PROCEDURE — 81003 URINALYSIS AUTO W/O SCOPE: CPT | Performed by: FAMILY MEDICINE

## 2023-07-17 RX ORDER — OXYCODONE HYDROCHLORIDE 5 MG/1
5 TABLET ORAL EVERY 6 HOURS PRN
Qty: 20 TABLET | Refills: 0 | Status: SHIPPED | OUTPATIENT
Start: 2023-07-17 | End: 2023-07-22

## 2023-07-17 RX ORDER — OXYCODONE HYDROCHLORIDE 5 MG/1
5 TABLET ORAL EVERY 4 HOURS PRN
COMMUNITY
End: 2023-07-17

## 2023-07-17 RX ORDER — CEPHALEXIN 500 MG/1
500 CAPSULE ORAL 2 TIMES DAILY
COMMUNITY

## 2023-07-17 RX ORDER — CIPROFLOXACIN 500 MG/1
500 TABLET, FILM COATED ORAL 2 TIMES DAILY
Qty: 14 TABLET | Refills: 0 | Status: SHIPPED | OUTPATIENT
Start: 2023-07-17 | End: 2023-07-24

## 2023-07-17 RX ORDER — LIDOCAINE 4 G/G
1 PATCH TOPICAL DAILY
Qty: 7 EACH | Refills: 0 | COMMUNITY
Start: 2023-07-13 | End: 2023-07-20

## 2023-07-17 RX ORDER — TRAMADOL HYDROCHLORIDE 50 MG/1
TABLET ORAL
COMMUNITY
Start: 2023-05-15 | End: 2023-07-17

## 2023-07-17 ASSESSMENT — ENCOUNTER SYMPTOMS: BACK PAIN: 1

## 2023-07-17 NOTE — RESULT ENCOUNTER NOTE
Let the patient know that the CT scan came back looking good with no evidence of kidney stone or other abnormality.   Would try the Cipro as an alternative Keflex and we will wait on the urine culture results

## 2023-07-17 NOTE — PROGRESS NOTES
Subjective:      Patient ID: John Haddad is a 78 y.o. female. HPI  complains of left lower back pain for 3 weeks. Currently on Keflex for uti. Urgent care on July 13 and had a urinalysis that showed some blood as well as nitrite positive. Urine culture was come back growing out E. coli which appeared to be sensitive to cephalosporins. She has never had a kidney stone. Has has had some subjective fevers at times.   Past Medical History:   Diagnosis Date    Arthritis     oa-  knees and elbows    Asthma     proair and neb     Cancer ([address]) dx---9/2016    bile duct --pancreatic----whipple-    Cancer ([address])     skin- basal cell- removed     Chronic pain     GERD (gastroesophageal reflux disease)     controlled with meds    Hypotension     \"off and on\"    IBS (irritable bowel syndrome)     Insomnia     Macular degeneration     Mass of ampulla of Vater     Menopause     Orthostatic hypotension     followed by Dr. Gus Bernal at [facility] cardio     Parkinson's disease [facility]) dx 2009    followed by [facility] Neurology ; on med for control     Psychiatric disorder     anxiety: PRN meds    PUD (peptic ulcer disease)     SVT (supraventricular tachycardia) ([address])     Syncope and collapse     last episode 2 months ago (noted 1/20/21)    Unspecified adverse effect of anesthesia     delayed awakening---     Weight loss        Allergies   Allergen Reactions    Aspirin Other (See Comments)     Painful stomach-- Hx of PUD    Levothyroxine Hives    Morphine Other (See Comments)     Hallucinations       Current Outpatient Medications   Medication Sig Dispense Refill    lidocaine 4 % external patch Apply 1 patch topically daily 7 each 0    cephALEXin (KEFLEX) 500 MG capsule Take 1 capsule by mouth 2 times daily      ciprofloxacin (CIPRO) 500 MG tablet Take 1 tablet by mouth 2 times daily for 7 days Stop keflex 14 tablet 0    carbidopa-levodopa (SINEMET CR)  MG per extended release tablet Take 1 tablet by mouth nightly 90

## 2023-07-20 LAB
BACTERIA SPEC CULT: NORMAL
SERVICE CMNT-IMP: NORMAL

## 2023-07-28 ASSESSMENT — LIFESTYLE VARIABLES
HOW MANY STANDARD DRINKS CONTAINING ALCOHOL DO YOU HAVE ON A TYPICAL DAY: PATIENT DOES NOT DRINK
HOW OFTEN DO YOU HAVE A DRINK CONTAINING ALCOHOL: NEVER

## 2023-08-04 ENCOUNTER — OFFICE VISIT (OUTPATIENT)
Dept: FAMILY MEDICINE CLINIC | Facility: CLINIC | Age: 83
End: 2023-08-04

## 2023-08-04 VITALS
HEIGHT: 61 IN | RESPIRATION RATE: 16 BRPM | HEART RATE: 76 BPM | WEIGHT: 109 LBS | SYSTOLIC BLOOD PRESSURE: 142 MMHG | BODY MASS INDEX: 20.58 KG/M2 | DIASTOLIC BLOOD PRESSURE: 51 MMHG | OXYGEN SATURATION: 98 % | TEMPERATURE: 98.2 F

## 2023-08-04 DIAGNOSIS — Z00.00 MEDICARE ANNUAL WELLNESS VISIT, SUBSEQUENT: Primary | ICD-10-CM

## 2023-08-04 DIAGNOSIS — R10.9 LEFT FLANK PAIN: ICD-10-CM

## 2023-08-04 DIAGNOSIS — M25.552 LEFT HIP PAIN: ICD-10-CM

## 2023-08-04 DIAGNOSIS — E55.9 VITAMIN D DEFICIENCY, UNSPECIFIED: ICD-10-CM

## 2023-08-04 DIAGNOSIS — G20 PARKINSON'S DISEASE (HCC): ICD-10-CM

## 2023-08-04 DIAGNOSIS — R32 URINARY INCONTINENCE, UNSPECIFIED TYPE: ICD-10-CM

## 2023-08-04 DIAGNOSIS — B37.0 THRUSH: ICD-10-CM

## 2023-08-04 DIAGNOSIS — J45.909 ASTHMA, UNSPECIFIED ASTHMA SEVERITY, UNSPECIFIED WHETHER COMPLICATED, UNSPECIFIED WHETHER PERSISTENT: ICD-10-CM

## 2023-08-04 DIAGNOSIS — R11.0 NAUSEA: ICD-10-CM

## 2023-08-04 LAB
25(OH)D3 SERPL-MCNC: 29.8 NG/ML (ref 30–100)
ALBUMIN SERPL-MCNC: 3.9 G/DL (ref 3.2–4.6)
ALBUMIN/GLOB SERPL: 1.3 (ref 0.4–1.6)
ALP SERPL-CCNC: 104 U/L (ref 50–136)
ALT SERPL-CCNC: 12 U/L (ref 12–65)
ANION GAP SERPL CALC-SCNC: 4 MMOL/L (ref 2–11)
AST SERPL-CCNC: 16 U/L (ref 15–37)
BASOPHILS # BLD: 0.1 K/UL (ref 0–0.2)
BASOPHILS NFR BLD: 1 % (ref 0–2)
BILIRUB SERPL-MCNC: 0.4 MG/DL (ref 0.2–1.1)
BUN SERPL-MCNC: 13 MG/DL (ref 8–23)
CALCIUM SERPL-MCNC: 9.5 MG/DL (ref 8.3–10.4)
CHLORIDE SERPL-SCNC: 113 MMOL/L (ref 101–110)
CO2 SERPL-SCNC: 28 MMOL/L (ref 21–32)
CREAT SERPL-MCNC: 0.9 MG/DL (ref 0.6–1)
DIFFERENTIAL METHOD BLD: ABNORMAL
EOSINOPHIL # BLD: 0.2 K/UL (ref 0–0.8)
EOSINOPHIL NFR BLD: 4 % (ref 0.5–7.8)
ERYTHROCYTE [DISTWIDTH] IN BLOOD BY AUTOMATED COUNT: 14.9 % (ref 11.9–14.6)
GLOBULIN SER CALC-MCNC: 3 G/DL (ref 2.8–4.5)
GLUCOSE SERPL-MCNC: 94 MG/DL (ref 65–100)
HCT VFR BLD AUTO: 36.2 % (ref 35.8–46.3)
HGB BLD-MCNC: 11.3 G/DL (ref 11.7–15.4)
IMM GRANULOCYTES # BLD AUTO: 0 K/UL (ref 0–0.5)
IMM GRANULOCYTES NFR BLD AUTO: 0 % (ref 0–5)
LYMPHOCYTES # BLD: 1.5 K/UL (ref 0.5–4.6)
LYMPHOCYTES NFR BLD: 27 % (ref 13–44)
MCH RBC QN AUTO: 28.9 PG (ref 26.1–32.9)
MCHC RBC AUTO-ENTMCNC: 31.2 G/DL (ref 31.4–35)
MCV RBC AUTO: 92.6 FL (ref 82–102)
MONOCYTES # BLD: 0.5 K/UL (ref 0.1–1.3)
MONOCYTES NFR BLD: 9 % (ref 4–12)
NEUTS SEG # BLD: 3.2 K/UL (ref 1.7–8.2)
NEUTS SEG NFR BLD: 59 % (ref 43–78)
NRBC # BLD: 0 K/UL (ref 0–0.2)
PLATELET # BLD AUTO: 190 K/UL (ref 150–450)
PMV BLD AUTO: 13 FL (ref 9.4–12.3)
POTASSIUM SERPL-SCNC: 3.8 MMOL/L (ref 3.5–5.1)
PROT SERPL-MCNC: 6.9 G/DL (ref 6.3–8.2)
RBC # BLD AUTO: 3.91 M/UL (ref 4.05–5.2)
SODIUM SERPL-SCNC: 145 MMOL/L (ref 133–143)
WBC # BLD AUTO: 5.4 K/UL (ref 4.3–11.1)

## 2023-08-04 RX ORDER — DONEPEZIL HYDROCHLORIDE 10 MG/1
10 TABLET, FILM COATED ORAL NIGHTLY
Qty: 90 TABLET | Refills: 3 | Status: SHIPPED | OUTPATIENT
Start: 2023-08-04

## 2023-08-04 RX ORDER — TRAMADOL HYDROCHLORIDE 50 MG/1
50 TABLET ORAL EVERY 6 HOURS PRN
Qty: 30 TABLET | Refills: 0 | Status: SHIPPED | OUTPATIENT
Start: 2023-08-04 | End: 2023-08-12

## 2023-08-04 RX ORDER — METHYLPREDNISOLONE 4 MG/1
TABLET ORAL
Qty: 1 KIT | Refills: 0 | Status: SHIPPED | OUTPATIENT
Start: 2023-08-04

## 2023-08-04 RX ORDER — MIDODRINE HYDROCHLORIDE 10 MG/1
TABLET ORAL
Qty: 270 TABLET | Refills: 3 | Status: SHIPPED | OUTPATIENT
Start: 2023-08-04

## 2023-08-04 RX ORDER — CARBIDOPA/LEVODOPA 25MG-250MG
1 TABLET ORAL 4 TIMES DAILY
Qty: 360 TABLET | Refills: 3 | Status: SHIPPED | OUTPATIENT
Start: 2023-08-04

## 2023-08-04 RX ORDER — FLUDROCORTISONE ACETATE 0.1 MG/1
TABLET ORAL
Qty: 90 TABLET | Refills: 3 | Status: SHIPPED | OUTPATIENT
Start: 2023-08-04

## 2023-08-04 RX ORDER — MEMANTINE HYDROCHLORIDE 10 MG/1
TABLET ORAL
Qty: 180 TABLET | Refills: 3 | Status: SHIPPED | OUTPATIENT
Start: 2023-08-04

## 2023-08-04 RX ORDER — ONDANSETRON 4 MG/1
4 TABLET, ORALLY DISINTEGRATING ORAL EVERY 8 HOURS PRN
Qty: 21 TABLET | Refills: 1 | Status: SHIPPED | OUTPATIENT
Start: 2023-08-04

## 2023-08-04 ASSESSMENT — PATIENT HEALTH QUESTIONNAIRE - PHQ9
SUM OF ALL RESPONSES TO PHQ9 QUESTIONS 1 & 2: 2
1. LITTLE INTEREST OR PLEASURE IN DOING THINGS: 1
SUM OF ALL RESPONSES TO PHQ QUESTIONS 1-9: 2
2. FEELING DOWN, DEPRESSED OR HOPELESS: 1
SUM OF ALL RESPONSES TO PHQ QUESTIONS 1-9: 2

## 2023-08-04 NOTE — PROGRESS NOTES
Medicare Annual Wellness Visit    Mendez Villalobos is here for Medicare AWV and Leg Swelling (Bilateral leg swelling  L > R)    Assessment & Plan   Medicare annual wellness visit, subsequent  Nausea  -     ondansetron (ZOFRAN-ODT) 4 MG disintegrating tablet; Take 1 tablet by mouth every 8 hours as needed for Nausea, Disp-21 tablet, R-1Normal  Urinary incontinence, unspecified type  Left flank pain  -     methylPREDNISolone (MEDROL, MEG,) 4 MG tablet; Take by mouth., Disp-1 kit, R-0Normal  -     traMADol (ULTRAM) 50 MG tablet; Take 1 tablet by mouth every 6 hours as needed for Pain for up to 8 days. Intended supply: 5 days. Take lowest dose possible to manage pain Max Daily Amount: 200 mg, Disp-30 tablet, R-0Normal  Vitamin D deficiency, unspecified  -     Vitamin D 25 Hydroxy; Future  Asthma, unspecified asthma severity, unspecified whether complicated, unspecified whether persistent  Parkinson's disease ([address])  -     CBC with Auto Differential; Future  -     Comprehensive Metabolic Panel; Future  Thrush  -     nystatin (MYCOSTATIN) 490508 UNIT/ML suspension; Take 5 mLs by mouth 4 times daily for 10 days Retain in mouth as long as possible, Oral, 4 TIMES DAILY Starting Fri 8/4/2023, Until Mon 8/14/2023, For 10 days, Disp-200 mL, R-0, Normal  Left hip pain  -     traMADol (ULTRAM) 50 MG tablet; Take 1 tablet by mouth every 6 hours as needed for Pain for up to 8 days. Intended supply: 5 days. Take lowest dose possible to manage pain Max Daily Amount: 200 mg, Disp-30 tablet, R-0Normal    Recommendations for Preventive Services Due: see orders and patient instructions/AVS.  Recommended screening schedule for the next 5-10 years is provided to the patient in written form: see Patient Instructions/AVS.  He had a negative urine culture and negative CT to rule out a stone so I think the left flank and back pain may be more musculoskeletal.  We will try Medrol Dosepak along with tramadol and see if that helps with pain.

## 2023-09-18 DIAGNOSIS — R32 URINARY INCONTINENCE, UNSPECIFIED TYPE: ICD-10-CM

## 2023-09-19 RX ORDER — OXYBUTYNIN CHLORIDE 5 MG/1
TABLET ORAL
Qty: 180 TABLET | Refills: 3 | Status: SHIPPED | OUTPATIENT
Start: 2023-09-19

## 2023-10-16 ENCOUNTER — OFFICE VISIT (OUTPATIENT)
Dept: FAMILY MEDICINE CLINIC | Facility: CLINIC | Age: 83
End: 2023-10-16
Payer: MEDICARE

## 2023-10-16 ENCOUNTER — TELEPHONE (OUTPATIENT)
Dept: FAMILY MEDICINE CLINIC | Facility: CLINIC | Age: 83
End: 2023-10-16

## 2023-10-16 VITALS
HEIGHT: 61 IN | HEART RATE: 86 BPM | SYSTOLIC BLOOD PRESSURE: 156 MMHG | RESPIRATION RATE: 16 BRPM | BODY MASS INDEX: 19.54 KG/M2 | WEIGHT: 103.5 LBS | DIASTOLIC BLOOD PRESSURE: 78 MMHG | TEMPERATURE: 98.8 F | OXYGEN SATURATION: 98 %

## 2023-10-16 DIAGNOSIS — R39.9 UTI SYMPTOMS: Primary | ICD-10-CM

## 2023-10-16 DIAGNOSIS — M25.552 LEFT HIP PAIN: ICD-10-CM

## 2023-10-16 DIAGNOSIS — B37.0 THRUSH: ICD-10-CM

## 2023-10-16 DIAGNOSIS — R31.9 URINARY TRACT INFECTION WITH HEMATURIA, SITE UNSPECIFIED: Primary | ICD-10-CM

## 2023-10-16 DIAGNOSIS — M25.552 LEFT HIP PAIN: Primary | ICD-10-CM

## 2023-10-16 DIAGNOSIS — N39.0 URINARY TRACT INFECTION WITH HEMATURIA, SITE UNSPECIFIED: Primary | ICD-10-CM

## 2023-10-16 LAB
BILIRUBIN, URINE, POC: NEGATIVE
BLOOD URINE, POC: ABNORMAL
GLUCOSE URINE, POC: NEGATIVE
KETONES, URINE, POC: NEGATIVE
LEUKOCYTE ESTERASE, URINE, POC: ABNORMAL
NITRITE, URINE, POC: POSITIVE
PH, URINE, POC: 7.5 (ref 4.6–8)
PROTEIN,URINE, POC: ABNORMAL
SPECIFIC GRAVITY, URINE, POC: 1.01 (ref 1–1.03)
URINALYSIS CLARITY, POC: ABNORMAL
URINALYSIS COLOR, POC: YELLOW
UROBILINOGEN, POC: NORMAL

## 2023-10-16 PROCEDURE — 99213 OFFICE O/P EST LOW 20 MIN: CPT | Performed by: NURSE PRACTITIONER

## 2023-10-16 PROCEDURE — 81003 URINALYSIS AUTO W/O SCOPE: CPT | Performed by: NURSE PRACTITIONER

## 2023-10-16 PROCEDURE — 1123F ACP DISCUSS/DSCN MKR DOCD: CPT | Performed by: NURSE PRACTITIONER

## 2023-10-16 RX ORDER — TRAMADOL HYDROCHLORIDE 50 MG/1
50 TABLET ORAL EVERY 8 HOURS PRN
Qty: 30 TABLET | Refills: 0 | Status: SHIPPED | OUTPATIENT
Start: 2023-10-16 | End: 2023-10-26

## 2023-10-16 RX ORDER — NITROFURANTOIN 25; 75 MG/1; MG/1
100 CAPSULE ORAL 2 TIMES DAILY
Qty: 14 CAPSULE | Refills: 0 | Status: SHIPPED | OUTPATIENT
Start: 2023-10-16 | End: 2023-10-23

## 2023-10-16 RX ORDER — DONEPEZIL HYDROCHLORIDE 10 MG/1
10 TABLET, FILM COATED ORAL NIGHTLY
Qty: 90 TABLET | Refills: 3 | Status: CANCELLED | OUTPATIENT
Start: 2023-10-16

## 2023-10-16 ASSESSMENT — ENCOUNTER SYMPTOMS
RESPIRATORY NEGATIVE: 1
COLOR CHANGE: 0

## 2023-10-16 NOTE — PATIENT INSTRUCTIONS
*You had an xray today. We have reviewed the images today. All of our x-rays, however, are sent out to be read by radiologist.  We will let you know if there is any change noted to the report.

## 2023-10-16 NOTE — PROGRESS NOTES
[address], [address]  Phone: [phone] Fax [phone]  Marita Cloud Sensor, [facility]           Elisabet Ruiz (: 1940) presents today c/o left hip pain, she has been using lidocaine patches that helps some. The pain comes and goes, The pain is worse in the evenings. The pain has been occurring for over a year. She is also needing refill of tramadol for hip pain, nystatin oral susp that helps with mouth pain sores/thrush. She is having urine frequency and urgency but is unable to urinate and states she will come back to complete urinalysis     Chief Complaint   Patient presents with    Urinary Tract Infection    Swelling     Both ankles, raising them at night helps    Hip Pain     Left hip. Has been using lidocaine patches which help a little bit. Patient states pain is worse than labor pains, pain comes and goes, but each causes her to stop moving suddenly     Patient Active Problem List   Diagnosis    Ampullary adenoma    Adenomyoma of gallbladder    Lower abdominal pain    Parkinson's disease    Pleural effusion    Environmental and seasonal allergies    Psychosis due to Parkinson's disease    Pulmonary infiltrates    Other dysphagia    Ilene-ampullary neoplasm    Hypoxemia    Leukocytosis (leucocytosis)    Parkinson disease    Post-operative state    Chronic fatigue    Cognitive deficit due to Parkinson's disease    Syncope and collapse    Cervical stenosis of spine    Orthostatic hypotension    Tremor    Neurologic orthostatic hypotension (HCC)    Heel spur    Asthma    GERD (gastroesophageal reflux disease)        Reviewed and updated this visit by provider:  Tobacco  Allergies  Meds  Problems  Med Hx  Surg Hx  Fam Hx           Review of Systems   Constitutional:  Negative for chills and fever. Respiratory: Negative. Cardiovascular: Negative. Genitourinary:  Positive for frequency and urgency.    Musculoskeletal:

## 2023-10-19 ENCOUNTER — TELEPHONE (OUTPATIENT)
Dept: FAMILY MEDICINE CLINIC | Facility: CLINIC | Age: 83
End: 2023-10-19

## 2023-10-19 NOTE — TELEPHONE ENCOUNTER
Patient states that she saw Waqar Odell on Monday and Waqar Odell told her she needed to come back this week. There is no mention of this in her chart or on her AVS. She wants to speak with Reva's nurse. Please call her.

## 2023-10-23 ENCOUNTER — OFFICE VISIT (OUTPATIENT)
Dept: FAMILY MEDICINE CLINIC | Facility: CLINIC | Age: 83
End: 2023-10-23
Payer: MEDICARE

## 2023-10-23 VITALS
HEIGHT: 61 IN | WEIGHT: 100 LBS | OXYGEN SATURATION: 98 % | BODY MASS INDEX: 18.88 KG/M2 | TEMPERATURE: 98.6 F | HEART RATE: 185 BPM | RESPIRATION RATE: 16 BRPM | DIASTOLIC BLOOD PRESSURE: 85 MMHG | SYSTOLIC BLOOD PRESSURE: 142 MMHG

## 2023-10-23 DIAGNOSIS — R00.0 TACHYCARDIA: ICD-10-CM

## 2023-10-23 DIAGNOSIS — F22 PARANOID BEHAVIOR (HCC): ICD-10-CM

## 2023-10-23 DIAGNOSIS — R41.0 CONFUSION: Primary | ICD-10-CM

## 2023-10-23 LAB
ALBUMIN SERPL-MCNC: 3.7 G/DL (ref 3.2–4.6)
ALBUMIN/GLOB SERPL: 0.9 (ref 0.4–1.6)
ALP SERPL-CCNC: 118 U/L (ref 50–136)
ALT SERPL-CCNC: 22 U/L (ref 12–65)
ANION GAP SERPL CALC-SCNC: 7 MMOL/L (ref 2–11)
AST SERPL-CCNC: 21 U/L (ref 15–37)
BASOPHILS # BLD: 0.1 K/UL (ref 0–0.2)
BASOPHILS NFR BLD: 1 % (ref 0–2)
BILIRUB SERPL-MCNC: 0.7 MG/DL (ref 0.2–1.1)
BILIRUBIN, URINE, POC: NEGATIVE
BLOOD URINE, POC: ABNORMAL
BUN SERPL-MCNC: 9 MG/DL (ref 8–23)
CALCIUM SERPL-MCNC: 10.1 MG/DL (ref 8.3–10.4)
CHLORIDE SERPL-SCNC: 100 MMOL/L (ref 101–110)
CO2 SERPL-SCNC: 35 MMOL/L (ref 21–32)
CREAT SERPL-MCNC: 1.1 MG/DL (ref 0.6–1)
DIFFERENTIAL METHOD BLD: ABNORMAL
EOSINOPHIL # BLD: 0.4 K/UL (ref 0–0.8)
EOSINOPHIL NFR BLD: 4 % (ref 0.5–7.8)
ERYTHROCYTE [DISTWIDTH] IN BLOOD BY AUTOMATED COUNT: 14.4 % (ref 11.9–14.6)
GLOBULIN SER CALC-MCNC: 4.1 G/DL (ref 2.8–4.5)
GLUCOSE SERPL-MCNC: 145 MG/DL (ref 65–100)
GLUCOSE URINE, POC: NEGATIVE
HCT VFR BLD AUTO: 39.4 % (ref 35.8–46.3)
HGB BLD-MCNC: 12.4 G/DL (ref 11.7–15.4)
IMM GRANULOCYTES # BLD AUTO: 0 K/UL (ref 0–0.5)
IMM GRANULOCYTES NFR BLD AUTO: 0 % (ref 0–5)
KETONES, URINE, POC: NEGATIVE
LEUKOCYTE ESTERASE, URINE, POC: NEGATIVE
LYMPHOCYTES # BLD: 0.9 K/UL (ref 0.5–4.6)
LYMPHOCYTES NFR BLD: 7 % (ref 13–44)
MCH RBC QN AUTO: 28.1 PG (ref 26.1–32.9)
MCHC RBC AUTO-ENTMCNC: 31.5 G/DL (ref 31.4–35)
MCV RBC AUTO: 89.3 FL (ref 82–102)
MONOCYTES # BLD: 0.8 K/UL (ref 0.1–1.3)
MONOCYTES NFR BLD: 7 % (ref 4–12)
NEUTS SEG # BLD: 9.6 K/UL (ref 1.7–8.2)
NEUTS SEG NFR BLD: 81 % (ref 43–78)
NITRITE, URINE, POC: NEGATIVE
NRBC # BLD: 0 K/UL (ref 0–0.2)
PH, URINE, POC: 7.5 (ref 4.6–8)
PLATELET # BLD AUTO: 188 K/UL (ref 150–450)
PMV BLD AUTO: 13.9 FL (ref 9.4–12.3)
POTASSIUM SERPL-SCNC: 3.2 MMOL/L (ref 3.5–5.1)
PROT SERPL-MCNC: 7.8 G/DL (ref 6.3–8.2)
PROTEIN,URINE, POC: ABNORMAL
RBC # BLD AUTO: 4.41 M/UL (ref 4.05–5.2)
SODIUM SERPL-SCNC: 142 MMOL/L (ref 133–143)
SPECIFIC GRAVITY, URINE, POC: 1.01 (ref 1–1.03)
URINALYSIS CLARITY, POC: CLEAR
URINALYSIS COLOR, POC: YELLOW
UROBILINOGEN, POC: NORMAL
WBC # BLD AUTO: 11.8 K/UL (ref 4.3–11.1)

## 2023-10-23 PROCEDURE — 99214 OFFICE O/P EST MOD 30 MIN: CPT | Performed by: NURSE PRACTITIONER

## 2023-10-23 PROCEDURE — 1123F ACP DISCUSS/DSCN MKR DOCD: CPT | Performed by: NURSE PRACTITIONER

## 2023-10-23 PROCEDURE — 93000 ELECTROCARDIOGRAM COMPLETE: CPT | Performed by: NURSE PRACTITIONER

## 2023-10-23 PROCEDURE — 81003 URINALYSIS AUTO W/O SCOPE: CPT | Performed by: NURSE PRACTITIONER

## 2023-10-23 ASSESSMENT — ENCOUNTER SYMPTOMS: RESPIRATORY NEGATIVE: 1

## 2023-10-23 NOTE — PROGRESS NOTES
irregularly irregular. Pulmonary:      Effort: Pulmonary effort is normal. No accessory muscle usage or respiratory distress. Breath sounds: Normal breath sounds. Musculoskeletal:      Right lower leg: No edema. Left lower leg: No edema. Neurological:      Mental Status: She is alert. Mental status is at baseline. She is confused. Psychiatric:         Mood and Affect: Mood is anxious. Speech: Speech is rapid and pressured. Behavior: Behavior is cooperative. Thought Content: Thought content is paranoid and delusional.         Cognition and Memory: Cognition is not impaired. Assessment/Plan:  1. Confusion    2. Tachycardia    3. Paranoid behavior ([address])    4. Urinary tract infection without hematuria, site unspecified          Elisabet was seen today for anxiety. Diagnoses and all orders for this visit:    Confusion  -     AMB POC URINALYSIS DIP STICK AUTO W/O MICRO  -     Comprehensive Metabolic Panel; Future  -     CBC with Auto Differential; Future  -     CBC with Auto Differential  -     Comprehensive Metabolic Panel  -     Ambulatory referral to Social Work    Tachycardia  -     EKG 12 Lead  -     AMB POC URINALYSIS DIP STICK AUTO W/O MICRO  -     Comprehensive Metabolic Panel; Future  -     CBC with Auto Differential; Future  -     CBC with Auto Differential  -     Comprehensive Metabolic Panel    Paranoid behavior ([address])  -     Ambulatory referral to Social Work    Urinary tract infection without hematuria, site unspecified       No orders of the defined types were placed in this encounter. No follow-ups on file. EKG reviewed, urinalysis reviewed and patient to finish antibiotic. Will discharge with care of . Referral to social work for information on caregiver and patient resources. Spoke with  her caregiver concerning patient confusion, he would like to know of resources to help. Reviewed patients past medical history.  We discussed

## 2023-10-25 ENCOUNTER — CARE COORDINATION (OUTPATIENT)
Dept: CARE COORDINATION | Facility: CLINIC | Age: 83
End: 2023-10-25

## 2023-10-25 NOTE — CARE COORDINATION
Initial Contact Social Work Note - Ambulatory  10/25/2023      Date of referral: 10.25.23  Referral received from: PCP  Reason for referral: care/support options    Previous SW referral: No  If yes, brief summary of outcome:     Two Identifiers Verified: Yes    Insurance Provider: Medicare    Support System:  Spouse/Partner and Adult Child/Children    [place] Status:     Community Providers:     ADL Assistance Needed:     Housing/Living Concerns or Home Modification Needs: none     Transportation Concern: none    Medication Cost Concern: none     Medication Adherence Concern: none    Financial Concern(s): none    Income (only if applicable): none    Ability to Read/Write: Yes    Advance Care Plan:  Not on file; educated patient    Other: Pt lives with spouse who is primary CG. They have a daughter that lives nearby who is available to assist as well. Identified Needs:  Extra assistance in the home    Social Work Plan:  Provided Medicaid information and sitter service list.  Provided [employer] information, GAPS link  Emailed to [email or web address]  Next Steps: remain available over the next month    Method of Communication With Provider (if appropriate): Chart Routing       Goals Addressed    None

## 2023-10-26 RX ORDER — POTASSIUM CHLORIDE 20 MEQ/1
20 TABLET, EXTENDED RELEASE ORAL 2 TIMES DAILY
Qty: 180 TABLET | Refills: 3 | Status: SHIPPED | OUTPATIENT
Start: 2023-10-26

## 2023-10-26 NOTE — RESULT ENCOUNTER NOTE
I think I would increase the potassium to 20 mEq twice a day instead of 10 mill equivalents and she has a follow-up in early December with me so in about 6 weeks and we can recheck a BMP at that time to reassess her potassium.   Sent in a new prescription for the potassium 20 mEq twice daily

## 2023-11-07 ENCOUNTER — APPOINTMENT (OUTPATIENT)
Dept: GENERAL RADIOLOGY | Age: 83
DRG: 309 | End: 2023-11-07
Payer: MEDICARE

## 2023-11-07 ENCOUNTER — HOSPITAL ENCOUNTER (INPATIENT)
Age: 83
LOS: 1 days | Discharge: HOME | DRG: 309 | End: 2023-11-10
Attending: EMERGENCY MEDICINE | Admitting: INTERNAL MEDICINE
Payer: MEDICARE

## 2023-11-07 DIAGNOSIS — I48.92 ATRIAL FLUTTER, UNSPECIFIED TYPE (HCC): Primary | ICD-10-CM

## 2023-11-07 DIAGNOSIS — I48.92 ATRIAL FLUTTER (HCC): ICD-10-CM

## 2023-11-07 LAB
ANION GAP SERPL CALC-SCNC: 9 MMOL/L (ref 2–11)
APTT PPP: 31.1 SEC (ref 24.5–34.2)
BASOPHILS # BLD: 0.1 K/UL (ref 0–0.2)
BASOPHILS NFR BLD: 1 % (ref 0–2)
BUN SERPL-MCNC: 16 MG/DL (ref 8–23)
CALCIUM SERPL-MCNC: 9.8 MG/DL (ref 8.3–10.4)
CHLORIDE SERPL-SCNC: 106 MMOL/L (ref 101–110)
CO2 SERPL-SCNC: 26 MMOL/L (ref 21–32)
CREAT SERPL-MCNC: 1.2 MG/DL (ref 0.6–1)
DIFFERENTIAL METHOD BLD: ABNORMAL
EKG ATRIAL RATE: 344 BPM
EKG DIAGNOSIS: NORMAL
EKG Q-T INTERVAL: 332 MS
EKG QRS DURATION: 58 MS
EKG QTC CALCULATION (BAZETT): 447 MS
EKG R AXIS: 63 DEGREES
EKG T AXIS: 71 DEGREES
EKG VENTRICULAR RATE: 109 BPM
EOSINOPHIL # BLD: 0.1 K/UL (ref 0–0.8)
EOSINOPHIL NFR BLD: 1 % (ref 0.5–7.8)
ERYTHROCYTE [DISTWIDTH] IN BLOOD BY AUTOMATED COUNT: 15.3 % (ref 11.9–14.6)
GLUCOSE SERPL-MCNC: 95 MG/DL (ref 65–100)
HCT VFR BLD AUTO: 42.6 % (ref 35.8–46.3)
HGB BLD-MCNC: 13.6 G/DL (ref 11.7–15.4)
IMM GRANULOCYTES # BLD AUTO: 0 K/UL (ref 0–0.5)
IMM GRANULOCYTES NFR BLD AUTO: 0 % (ref 0–5)
LYMPHOCYTES # BLD: 1.3 K/UL (ref 0.5–4.6)
LYMPHOCYTES NFR BLD: 17 % (ref 13–44)
MAGNESIUM SERPL-MCNC: 2.1 MG/DL (ref 1.8–2.4)
MCH RBC QN AUTO: 27.9 PG (ref 26.1–32.9)
MCHC RBC AUTO-ENTMCNC: 31.9 G/DL (ref 31.4–35)
MCV RBC AUTO: 87.5 FL (ref 82–102)
MONOCYTES # BLD: 0.5 K/UL (ref 0.1–1.3)
MONOCYTES NFR BLD: 6 % (ref 4–12)
NEUTS SEG # BLD: 6 K/UL (ref 1.7–8.2)
NEUTS SEG NFR BLD: 75 % (ref 43–78)
NRBC # BLD: 0 K/UL (ref 0–0.2)
PLATELET # BLD AUTO: 290 K/UL (ref 150–450)
PMV BLD AUTO: 12.6 FL (ref 9.4–12.3)
POTASSIUM SERPL-SCNC: 4.1 MMOL/L (ref 3.5–5.1)
RBC # BLD AUTO: 4.87 M/UL (ref 4.05–5.2)
SODIUM SERPL-SCNC: 141 MMOL/L (ref 133–143)
TROPONIN I SERPL HS-MCNC: 15.2 PG/ML (ref 0–14)
TROPONIN I SERPL HS-MCNC: 15.8 PG/ML (ref 0–14)
WBC # BLD AUTO: 8 K/UL (ref 4.3–11.1)

## 2023-11-07 PROCEDURE — 76937 US GUIDE VASCULAR ACCESS: CPT

## 2023-11-07 PROCEDURE — 93010 ELECTROCARDIOGRAM REPORT: CPT | Performed by: INTERNAL MEDICINE

## 2023-11-07 PROCEDURE — 71046 X-RAY EXAM CHEST 2 VIEWS: CPT

## 2023-11-07 PROCEDURE — 2580000003 HC RX 258: Performed by: NURSE PRACTITIONER

## 2023-11-07 PROCEDURE — 96366 THER/PROPH/DIAG IV INF ADDON: CPT

## 2023-11-07 PROCEDURE — 80048 BASIC METABOLIC PNL TOTAL CA: CPT

## 2023-11-07 PROCEDURE — 99285 EMERGENCY DEPT VISIT HI MDM: CPT

## 2023-11-07 PROCEDURE — 96376 TX/PRO/DX INJ SAME DRUG ADON: CPT

## 2023-11-07 PROCEDURE — 6370000000 HC RX 637 (ALT 250 FOR IP): Performed by: NURSE PRACTITIONER

## 2023-11-07 PROCEDURE — 36415 COLL VENOUS BLD VENIPUNCTURE: CPT

## 2023-11-07 PROCEDURE — G0378 HOSPITAL OBSERVATION PER HR: HCPCS

## 2023-11-07 PROCEDURE — 93005 ELECTROCARDIOGRAM TRACING: CPT | Performed by: EMERGENCY MEDICINE

## 2023-11-07 PROCEDURE — 96375 TX/PRO/DX INJ NEW DRUG ADDON: CPT

## 2023-11-07 PROCEDURE — 83735 ASSAY OF MAGNESIUM: CPT

## 2023-11-07 PROCEDURE — 96365 THER/PROPH/DIAG IV INF INIT: CPT

## 2023-11-07 PROCEDURE — 2500000003 HC RX 250 WO HCPCS: Performed by: NURSE PRACTITIONER

## 2023-11-07 PROCEDURE — 85730 THROMBOPLASTIN TIME PARTIAL: CPT

## 2023-11-07 PROCEDURE — 85025 COMPLETE CBC W/AUTO DIFF WBC: CPT

## 2023-11-07 PROCEDURE — 2500000003 HC RX 250 WO HCPCS: Performed by: EMERGENCY MEDICINE

## 2023-11-07 PROCEDURE — 96374 THER/PROPH/DIAG INJ IV PUSH: CPT

## 2023-11-07 PROCEDURE — 84484 ASSAY OF TROPONIN QUANT: CPT

## 2023-11-07 PROCEDURE — 85520 HEPARIN ASSAY: CPT

## 2023-11-07 RX ORDER — POTASSIUM CHLORIDE 7.45 MG/ML
10 INJECTION INTRAVENOUS PRN
Status: DISCONTINUED | OUTPATIENT
Start: 2023-11-07 | End: 2023-11-10 | Stop reason: HOSPADM

## 2023-11-07 RX ORDER — MIDODRINE HYDROCHLORIDE 5 MG/1
10 TABLET ORAL
Status: DISCONTINUED | OUTPATIENT
Start: 2023-11-08 | End: 2023-11-10 | Stop reason: HOSPADM

## 2023-11-07 RX ORDER — NITROGLYCERIN 0.4 MG/1
0.4 TABLET SUBLINGUAL EVERY 5 MIN PRN
Status: DISCONTINUED | OUTPATIENT
Start: 2023-11-07 | End: 2023-11-10 | Stop reason: HOSPADM

## 2023-11-07 RX ORDER — HEPARIN SODIUM 1000 [USP'U]/ML
60 INJECTION, SOLUTION INTRAVENOUS; SUBCUTANEOUS ONCE
Status: COMPLETED | OUTPATIENT
Start: 2023-11-07 | End: 2023-11-08

## 2023-11-07 RX ORDER — HEPARIN SODIUM 1000 [USP'U]/ML
60 INJECTION, SOLUTION INTRAVENOUS; SUBCUTANEOUS PRN
Status: DISCONTINUED | OUTPATIENT
Start: 2023-11-07 | End: 2023-11-08

## 2023-11-07 RX ORDER — SODIUM CHLORIDE 0.9 % (FLUSH) 0.9 %
5-40 SYRINGE (ML) INJECTION EVERY 12 HOURS SCHEDULED
Status: DISCONTINUED | OUTPATIENT
Start: 2023-11-07 | End: 2023-11-10 | Stop reason: HOSPADM

## 2023-11-07 RX ORDER — CARBIDOPA/LEVODOPA 25MG-250MG
1 TABLET ORAL 4 TIMES DAILY
Status: DISCONTINUED | OUTPATIENT
Start: 2023-11-07 | End: 2023-11-10 | Stop reason: HOSPADM

## 2023-11-07 RX ORDER — POLYETHYLENE GLYCOL 3350 17 G/17G
17 POWDER, FOR SOLUTION ORAL DAILY PRN
Status: DISCONTINUED | OUTPATIENT
Start: 2023-11-07 | End: 2023-11-10 | Stop reason: HOSPADM

## 2023-11-07 RX ORDER — HEPARIN SODIUM 1000 [USP'U]/ML
30 INJECTION, SOLUTION INTRAVENOUS; SUBCUTANEOUS PRN
Status: DISCONTINUED | OUTPATIENT
Start: 2023-11-07 | End: 2023-11-08

## 2023-11-07 RX ORDER — POTASSIUM CHLORIDE 20 MEQ/1
40 TABLET, EXTENDED RELEASE ORAL PRN
Status: DISCONTINUED | OUTPATIENT
Start: 2023-11-07 | End: 2023-11-10 | Stop reason: HOSPADM

## 2023-11-07 RX ORDER — FAMOTIDINE 20 MG/1
20 TABLET, FILM COATED ORAL DAILY
Status: DISCONTINUED | OUTPATIENT
Start: 2023-11-08 | End: 2023-11-10 | Stop reason: HOSPADM

## 2023-11-07 RX ORDER — DILTIAZEM HYDROCHLORIDE 5 MG/ML
5 INJECTION INTRAVENOUS ONCE
Status: COMPLETED | OUTPATIENT
Start: 2023-11-07 | End: 2023-11-07

## 2023-11-07 RX ORDER — ONDANSETRON 2 MG/ML
4 INJECTION INTRAMUSCULAR; INTRAVENOUS EVERY 4 HOURS PRN
Status: DISCONTINUED | OUTPATIENT
Start: 2023-11-07 | End: 2023-11-10 | Stop reason: HOSPADM

## 2023-11-07 RX ORDER — OXYBUTYNIN CHLORIDE 5 MG/1
5 TABLET ORAL NIGHTLY
Status: DISCONTINUED | OUTPATIENT
Start: 2023-11-07 | End: 2023-11-10 | Stop reason: HOSPADM

## 2023-11-07 RX ORDER — SODIUM CHLORIDE 0.9 % (FLUSH) 0.9 %
5-40 SYRINGE (ML) INJECTION PRN
Status: DISCONTINUED | OUTPATIENT
Start: 2023-11-07 | End: 2023-11-10 | Stop reason: HOSPADM

## 2023-11-07 RX ORDER — ACETAMINOPHEN 325 MG/1
650 TABLET ORAL EVERY 6 HOURS PRN
Status: DISCONTINUED | OUTPATIENT
Start: 2023-11-07 | End: 2023-11-10 | Stop reason: HOSPADM

## 2023-11-07 RX ORDER — MAGNESIUM SULFATE IN WATER 40 MG/ML
2000 INJECTION, SOLUTION INTRAVENOUS PRN
Status: DISCONTINUED | OUTPATIENT
Start: 2023-11-07 | End: 2023-11-10 | Stop reason: HOSPADM

## 2023-11-07 RX ORDER — FLUDROCORTISONE ACETATE 0.1 MG/1
0.1 TABLET ORAL DAILY
Status: DISCONTINUED | OUTPATIENT
Start: 2023-11-08 | End: 2023-11-10 | Stop reason: HOSPADM

## 2023-11-07 RX ORDER — HEPARIN SODIUM 10000 [USP'U]/100ML
5-30 INJECTION, SOLUTION INTRAVENOUS CONTINUOUS
Status: DISCONTINUED | OUTPATIENT
Start: 2023-11-07 | End: 2023-11-08

## 2023-11-07 RX ORDER — DONEPEZIL HYDROCHLORIDE 5 MG/1
10 TABLET, FILM COATED ORAL NIGHTLY
Status: DISCONTINUED | OUTPATIENT
Start: 2023-11-07 | End: 2023-11-10 | Stop reason: HOSPADM

## 2023-11-07 RX ORDER — DILTIAZEM HYDROCHLORIDE 5 MG/ML
10 INJECTION INTRAVENOUS
Status: COMPLETED | OUTPATIENT
Start: 2023-11-07 | End: 2023-11-07

## 2023-11-07 RX ADMIN — SODIUM CHLORIDE, PRESERVATIVE FREE 10 ML: 5 INJECTION INTRAVENOUS at 22:48

## 2023-11-07 RX ADMIN — DONEPEZIL HYDROCHLORIDE 10 MG: 5 TABLET, FILM COATED ORAL at 22:48

## 2023-11-07 RX ADMIN — OXYBUTYNIN CHLORIDE 5 MG: 5 TABLET ORAL at 22:48

## 2023-11-07 RX ADMIN — DILTIAZEM HYDROCHLORIDE 5 MG: 5 INJECTION INTRAVENOUS at 22:47

## 2023-11-07 RX ADMIN — CARBIDOPA AND LEVODOPA 1 TABLET: 25; 250 TABLET ORAL at 22:48

## 2023-11-07 RX ADMIN — DILTIAZEM HYDROCHLORIDE 10 MG: 5 INJECTION INTRAVENOUS at 18:38

## 2023-11-07 RX ADMIN — SODIUM CHLORIDE 5 MG/HR: 900 INJECTION, SOLUTION INTRAVENOUS at 22:46

## 2023-11-07 ASSESSMENT — ENCOUNTER SYMPTOMS
NAUSEA: 0
COUGH: 1
BACK PAIN: 0
VOMITING: 0
SORE THROAT: 0
SHORTNESS OF BREATH: 1
ABDOMINAL PAIN: 0

## 2023-11-07 ASSESSMENT — LIFESTYLE VARIABLES
HOW OFTEN DO YOU HAVE A DRINK CONTAINING ALCOHOL: NEVER
HOW MANY STANDARD DRINKS CONTAINING ALCOHOL DO YOU HAVE ON A TYPICAL DAY: PATIENT DOES NOT DRINK

## 2023-11-07 NOTE — ED TRIAGE NOTES
Pt ambulatory to triage c/o \"feeling heart palpitations\" and weakness. C.P, and SOB . Pt denies cardiac HX

## 2023-11-07 NOTE — ED PROVIDER NOTES
(minutes):  32    Critical care time was exclusive of:  Separately billable procedures and treating other patients    Critical care was necessary to treat or prevent imminent or life-threatening deterioration of the following conditions:  Cardiac failure    Critical care was time spent personally by me on the following activities:  Development of treatment plan with patient or surrogate, discussions with consultants, evaluation of patient's response to treatment, examination of patient, obtaining history from patient or surrogate, ordering and performing treatments and interventions, ordering and review of laboratory studies, ordering and review of radiographic studies, re-evaluation of patient's condition and review of old charts    I assumed direction of critical care for this patient from another provider in my specialty: no      Care discussed with: admitting provider        Results Include:    Recent Results (from the past 24 hour(s))   EKG 12 Lead    Collection Time: 11/07/23  3:18 PM   Result Value Ref Range    Ventricular Rate 109 BPM    Atrial Rate 344 BPM    QRS Duration 58 ms    Q-T Interval 332 ms    QTc Calculation (Bazett) 447 ms    R Axis 63 degrees    T Axis 71 degrees    Diagnosis       Atrial flutter with variable A-V block  Cannot rule out Anterior infarct , age undetermined  Abnormal ECG  Confirmed by ST STACI ADAM MD (), SIA KOWALSKI ([id number]) on 11/7/2023 5:06:28 PM     Basic Metabolic Panel    Collection Time: 11/07/23  4:17 PM   Result Value Ref Range    Sodium 141 133 - 143 mmol/L    Potassium 4.1 3.5 - 5.1 mmol/L    Chloride 106 101 - 110 mmol/L    CO2 26 21 - 32 mmol/L    Anion Gap 9 2 - 11 mmol/L    Glucose 95 65 - 100 mg/dL    BUN 16 8 - 23 MG/DL    Creatinine 1.20 (H) 0.6 - 1.0 MG/DL    Est, Glom Filt Rate 44 (L) >60 ml/min/1.73m2    Calcium 9.8 8.3 - 10.4 MG/DL   CBC with Auto Differential    Collection Time: 11/07/23  4:17 PM   Result Value Ref Range    WBC 8.0 4.3 - 11.1 K/uL    RBC 4.87 4.05 - 5.2 M/uL    Hemoglobin 13.6 11.7 - 15.4 g/dL    Hematocrit 42.6 35.8 - 46.3 %    MCV 87.5 82 - 102 FL    MCH 27.9 26.1 - 32.9 PG    MCHC 31.9 31.4 - 35.0 g/dL    RDW 15.3 (H) 11.9 - 14.6 %    Platelets 672 055 - 676 K/uL    MPV 12.6 (H) 9.4 - 12.3 FL    nRBC 0.00 0.0 - 0.2 K/uL    Differential Type AUTOMATED      Neutrophils % 75 43 - 78 %    Lymphocytes % 17 13 - 44 %    Monocytes % 6 4.0 - 12.0 %    Eosinophils % 1 0.5 - 7.8 %    Basophils % 1 0.0 - 2.0 %    Immature Granulocytes 0 0.0 - 5.0 %    Neutrophils Absolute 6.0 1.7 - 8.2 K/UL    Lymphocytes Absolute 1.3 0.5 - 4.6 K/UL    Monocytes Absolute 0.5 0.1 - 1.3 K/UL    Eosinophils Absolute 0.1 0.0 - 0.8 K/UL    Basophils Absolute 0.1 0.0 - 0.2 K/UL    Absolute Immature Granulocyte 0.0 0.0 - 0.5 K/UL   Troponin    Collection Time: 11/07/23  4:17 PM   Result Value Ref Range    Troponin, High Sensitivity 15.2 (H) 0 - 14 pg/mL   Magnesium    Collection Time: 11/07/23  4:17 PM   Result Value Ref Range    Magnesium 2.1 1.8 - 2.4 mg/dL          XR CHEST (2 VW)   Final Result   No active disease in the chest.                                ED Course as of 11/07/23 2036 Tue Nov 07, 2023 1704 I looked at patient's EKG she was in a flutter. Dr. Magdaleno Webb has agreed to take over this patient. Charge nurse notified that patient needs a bed with a monitor [JG]   2008 Patient is resting comfortably in bed with heart rate in the 90s on the monitor. He still appears in A-fib. She states she is feeling better currently. I updated her on the results and plan. [CW]   2019 I messaged with Dr. Milad Perez of cardiology about admission. [CW]      ED Course User Index  [CW] Vish Reyna MD  [JG] Zahida Barajas       The diagnosis and plan as well as the results of any testing and treatments today in the department were communicated to the patient and/or their family/caregiver (if present).   The patient/caregiver verbalized understanding and realize that they are

## 2023-11-08 ENCOUNTER — CARE COORDINATION (OUTPATIENT)
Dept: CARE COORDINATION | Facility: CLINIC | Age: 83
End: 2023-11-08

## 2023-11-08 ENCOUNTER — APPOINTMENT (OUTPATIENT)
Dept: CARDIAC CATH/INVASIVE PROCEDURES | Age: 83
DRG: 309 | End: 2023-11-08
Attending: INTERNAL MEDICINE
Payer: MEDICARE

## 2023-11-08 PROBLEM — I95.1 ORTHOSTATIC HYPOTENSION: Status: ACTIVE | Noted: 2017-08-25

## 2023-11-08 PROBLEM — F06.8 PSYCHOSIS DUE TO PARKINSON'S DISEASE: Status: RESOLVED | Noted: 2020-02-06 | Resolved: 2023-11-08

## 2023-11-08 PROBLEM — G20.A1 PARKINSON DISEASE: Status: RESOLVED | Noted: 2017-03-25 | Resolved: 2023-11-08

## 2023-11-08 PROBLEM — G20.A1 PSYCHOSIS DUE TO PARKINSON'S DISEASE: Status: RESOLVED | Noted: 2020-02-06 | Resolved: 2023-11-08

## 2023-11-08 PROBLEM — G20 PSYCHOSIS DUE TO PARKINSON'S DISEASE: Status: RESOLVED | Noted: 2020-02-06 | Resolved: 2023-11-08

## 2023-11-08 LAB
ANION GAP SERPL CALC-SCNC: 7 MMOL/L (ref 2–11)
BUN SERPL-MCNC: 16 MG/DL (ref 8–23)
CALCIUM SERPL-MCNC: 8.9 MG/DL (ref 8.3–10.4)
CHLORIDE SERPL-SCNC: 107 MMOL/L (ref 101–110)
CO2 SERPL-SCNC: 29 MMOL/L (ref 21–32)
CREAT SERPL-MCNC: 0.9 MG/DL (ref 0.6–1)
ECHO BSA: 1.37 M2
EKG ATRIAL RATE: 78 BPM
EKG DIAGNOSIS: NORMAL
EKG P AXIS: 82 DEGREES
EKG P-R INTERVAL: 144 MS
EKG Q-T INTERVAL: 420 MS
EKG QRS DURATION: 68 MS
EKG QTC CALCULATION (BAZETT): 478 MS
EKG R AXIS: -55 DEGREES
EKG T AXIS: 55 DEGREES
EKG VENTRICULAR RATE: 78 BPM
ERYTHROCYTE [DISTWIDTH] IN BLOOD BY AUTOMATED COUNT: 14.9 % (ref 11.9–14.6)
GLUCOSE SERPL-MCNC: 111 MG/DL (ref 65–100)
HCT VFR BLD AUTO: 36.4 % (ref 35.8–46.3)
HGB BLD-MCNC: 11.5 G/DL (ref 11.7–15.4)
MCH RBC QN AUTO: 27.3 PG (ref 26.1–32.9)
MCHC RBC AUTO-ENTMCNC: 31.6 G/DL (ref 31.4–35)
MCV RBC AUTO: 86.5 FL (ref 82–102)
NRBC # BLD: 0 K/UL (ref 0–0.2)
PLATELET # BLD AUTO: 240 K/UL (ref 150–450)
PMV BLD AUTO: 11.8 FL (ref 9.4–12.3)
POTASSIUM SERPL-SCNC: 3.7 MMOL/L (ref 3.5–5.1)
RBC # BLD AUTO: 4.21 M/UL (ref 4.05–5.2)
SODIUM SERPL-SCNC: 143 MMOL/L (ref 133–143)
UFH PPP CHRO-ACNC: 0.18 IU/ML (ref 0.3–0.7)
UFH PPP CHRO-ACNC: <0.1 IU/ML (ref 0.3–0.7)
WBC # BLD AUTO: 7.6 K/UL (ref 4.3–11.1)

## 2023-11-08 PROCEDURE — 6370000000 HC RX 637 (ALT 250 FOR IP): Performed by: INTERNAL MEDICINE

## 2023-11-08 PROCEDURE — G0378 HOSPITAL OBSERVATION PER HR: HCPCS

## 2023-11-08 PROCEDURE — 6370000000 HC RX 637 (ALT 250 FOR IP): Performed by: NURSE PRACTITIONER

## 2023-11-08 PROCEDURE — 96368 THER/DIAG CONCURRENT INF: CPT

## 2023-11-08 PROCEDURE — 99152 MOD SED SAME PHYS/QHP 5/>YRS: CPT | Performed by: INTERNAL MEDICINE

## 2023-11-08 PROCEDURE — 36415 COLL VENOUS BLD VENIPUNCTURE: CPT

## 2023-11-08 PROCEDURE — 96366 THER/PROPH/DIAG IV INF ADDON: CPT

## 2023-11-08 PROCEDURE — 93325 DOPPLER ECHO COLOR FLOW MAPG: CPT

## 2023-11-08 PROCEDURE — 80048 BASIC METABOLIC PNL TOTAL CA: CPT

## 2023-11-08 PROCEDURE — 2580000003 HC RX 258: Performed by: NURSE PRACTITIONER

## 2023-11-08 PROCEDURE — 93312 ECHO TRANSESOPHAGEAL: CPT

## 2023-11-08 PROCEDURE — 6360000002 HC RX W HCPCS: Performed by: INTERNAL MEDICINE

## 2023-11-08 PROCEDURE — 99152 MOD SED SAME PHYS/QHP 5/>YRS: CPT

## 2023-11-08 PROCEDURE — 85027 COMPLETE CBC AUTOMATED: CPT

## 2023-11-08 PROCEDURE — 93005 ELECTROCARDIOGRAM TRACING: CPT | Performed by: INTERNAL MEDICINE

## 2023-11-08 PROCEDURE — 85520 HEPARIN ASSAY: CPT

## 2023-11-08 PROCEDURE — 6360000002 HC RX W HCPCS: Performed by: NURSE PRACTITIONER

## 2023-11-08 RX ORDER — MIDAZOLAM HYDROCHLORIDE 1 MG/ML
INJECTION INTRAMUSCULAR; INTRAVENOUS PRN
Status: COMPLETED | OUTPATIENT
Start: 2023-11-08 | End: 2023-11-08

## 2023-11-08 RX ORDER — AMIODARONE HYDROCHLORIDE 200 MG/1
200 TABLET ORAL DAILY
Status: DISCONTINUED | OUTPATIENT
Start: 2023-11-08 | End: 2023-11-10 | Stop reason: HOSPADM

## 2023-11-08 RX ORDER — LIDOCAINE HYDROCHLORIDE 20 MG/ML
SOLUTION OROPHARYNGEAL PRN
Status: COMPLETED | OUTPATIENT
Start: 2023-11-08 | End: 2023-11-08

## 2023-11-08 RX ADMIN — HEPARIN SODIUM 1310 UNITS: 1000 INJECTION INTRAVENOUS; SUBCUTANEOUS at 10:12

## 2023-11-08 RX ADMIN — AMIODARONE HYDROCHLORIDE 200 MG: 200 TABLET ORAL at 15:02

## 2023-11-08 RX ADMIN — MIDAZOLAM 1 MG: 1 INJECTION INTRAMUSCULAR; INTRAVENOUS at 08:59

## 2023-11-08 RX ADMIN — MIDAZOLAM 1 MG: 1 INJECTION INTRAMUSCULAR; INTRAVENOUS at 09:01

## 2023-11-08 RX ADMIN — APIXABAN 2.5 MG: 2.5 TABLET, FILM COATED ORAL at 11:29

## 2023-11-08 RX ADMIN — MIDAZOLAM 1 MG: 1 INJECTION INTRAMUSCULAR; INTRAVENOUS at 08:57

## 2023-11-08 RX ADMIN — HEPARIN SODIUM 2620 UNITS: 1000 INJECTION INTRAVENOUS; SUBCUTANEOUS at 00:10

## 2023-11-08 RX ADMIN — APIXABAN 2.5 MG: 2.5 TABLET, FILM COATED ORAL at 20:32

## 2023-11-08 RX ADMIN — CARBIDOPA AND LEVODOPA 1 TABLET: 25; 250 TABLET ORAL at 11:28

## 2023-11-08 RX ADMIN — FLUDROCORTISONE ACETATE 0.1 MG: 0.1 TABLET ORAL at 11:29

## 2023-11-08 RX ADMIN — HEPARIN SODIUM 12 UNITS/KG/HR: 10000 INJECTION, SOLUTION INTRAVENOUS at 00:10

## 2023-11-08 RX ADMIN — LIDOCAINE HYDROCHLORIDE 15 ML: 20 SOLUTION ORAL at 08:40

## 2023-11-08 RX ADMIN — OXYBUTYNIN CHLORIDE 5 MG: 5 TABLET ORAL at 20:32

## 2023-11-08 RX ADMIN — MIDAZOLAM 1 MG: 1 INJECTION INTRAMUSCULAR; INTRAVENOUS at 09:06

## 2023-11-08 RX ADMIN — CARBIDOPA AND LEVODOPA 1 TABLET: 25; 250 TABLET ORAL at 20:32

## 2023-11-08 RX ADMIN — MIDODRINE HYDROCHLORIDE 10 MG: 5 TABLET ORAL at 16:40

## 2023-11-08 RX ADMIN — DONEPEZIL HYDROCHLORIDE 10 MG: 5 TABLET, FILM COATED ORAL at 20:32

## 2023-11-08 RX ADMIN — SODIUM CHLORIDE, PRESERVATIVE FREE 10 ML: 5 INJECTION INTRAVENOUS at 20:32

## 2023-11-08 RX ADMIN — CARBIDOPA AND LEVODOPA 1 TABLET: 25; 250 TABLET ORAL at 16:40

## 2023-11-08 RX ADMIN — MIDODRINE HYDROCHLORIDE 10 MG: 5 TABLET ORAL at 11:29

## 2023-11-08 RX ADMIN — SODIUM CHLORIDE, PRESERVATIVE FREE 10 ML: 5 INJECTION INTRAVENOUS at 11:44

## 2023-11-08 RX ADMIN — MIDAZOLAM 1 MG: 1 INJECTION INTRAMUSCULAR; INTRAVENOUS at 09:02

## 2023-11-08 RX ADMIN — FAMOTIDINE 20 MG: 20 TABLET, FILM COATED ORAL at 11:28

## 2023-11-08 ASSESSMENT — PAIN SCALES - GENERAL
PAINLEVEL_OUTOF10: 0

## 2023-11-08 ASSESSMENT — ENCOUNTER SYMPTOMS
RESPIRATORY NEGATIVE: 1
EYES NEGATIVE: 1
GASTROINTESTINAL NEGATIVE: 1
ALLERGIC/IMMUNOLOGIC NEGATIVE: 1

## 2023-11-08 NOTE — PROGRESS NOTES
TRANSFER - IN REPORT:    Verbal report received from [address] [place] [address] [facility]  being received from ED for routine progression of patient care      Report consisted of patient's Situation, Background, Assessment and   Recommendations(SBAR). Information from the following report(s) Nurse Handoff Report, ED Encounter Summary, ED SBAR, MAR, and Cardiac Rhythm afib   was reviewed with the receiving nurse. Opportunity for questions and clarification was provided. Assessment completed upon patient's arrival to unit and care assumed. Patient received to room 331. Patient connected to monitor and assessment completed. Plan of care reviewed. Patient oriented to room and call light. Patient aware to use call light to communicate any chest pain or needs. Admission skin assessment completed with second RN Johanny Kennedy) and reveals the following: Skin warm, dry, and intact. Sacrum and heels intact and free from redness. Scattered bruising and scars. Fragile. Damian Carter NP at bedside. Orders received.

## 2023-11-08 NOTE — PROGRESS NOTES
CATALINA/CVN by Dr Shelley April  II ASA II Mallampati  5mg versed  Viscous Solution given at 0840  1 shock at 200 joules synced  Post cardioversion rhythm NSR  Pt tolerated well.

## 2023-11-08 NOTE — CARE COORDINATION
Patient admitted in Obs status with Atrial flutter. S/P successful DCCV. CM reviewed clinical record and spoke with patient and her family. No CM needs identified. CM will follow. 11/08/23 1339   Service Assessment   Patient Orientation Alert and Oriented   Cognition Alert   History Provided By Patient   Primary Caregiver Self   Accompanied By/Relationship Family members   Support Systems Spouse/Significant Other;Children;Family Members   Patient's Healthcare Decision Maker is: Legal Next of [address] [place] [address]   PCP Verified by CM Yes   Last Visit to PCP Within last 3 months   Prior Functional Level Independent in ADLs/IADLs   Current Functional Level Independent in ADLs/IADLs   Can patient return to prior living arrangement Yes   Ability to make needs known: Good   Family able to assist with home care needs: Yes   Would you like for me to discuss the discharge plan with any other family members/significant others, and if so, who? No   Financial Resources Medicare; Other (Comment)  ( supplement)   Community Resources None   Social/Functional History   Lives With Spouse   Type of [address] [employer] None   Receives Help From Family   ADL Assistance Independent   Ambulation Assistance Independent   Active  No   Patient's  Info Family member transports   Mode of Transportation Car   Occupation Retired   Discharge Planning   Type of [address] [place] [address] Prior To Admission None   Potential Assistance Needed N/A   DME Ordered? No   Potential Assistance Purchasing Medications No   Type of Home Care Services None   Patient expects to be discharged to: [facility] Discharge   Transition of Care Consult (CM Consult) Discharge [facility] Discharge None   [facility] Information Provided?  No   Mode of Transport at Discharge Other (see comment)  (Family member to transport by car.)   Confirm Follow Up Transport

## 2023-11-08 NOTE — ACP (ADVANCE CARE PLANNING)
Advance Care Planning     Advance Care Planning Activator (Inpatient)  Conversation Note      Date of ACP Conversation: 11/8/2023     Conversation Conducted with: Patient with Decision Making Capacity    ACP Activator: Segundo Grove RN    Health Care Decision Maker:     Current Designated Health Care Decision Maker:     Primary Decision Maker: Donald Cortezaugustinecarmelina  Spouse - [phone]  Click here to complete Healthcare Decision Makers including section of the Healthcare Decision Maker Relationship (ie \"Primary\")  Today we documented Decision Maker(s). The patient will provide ACP documents.

## 2023-11-08 NOTE — PROGRESS NOTES
US Guided PIV access    Called for assistance with IV access. Ultrasound was used to find the vein which was compressible and does not have any features of an artery or nerve bundle. Skin was cleaned and disinfected prior to IV puncture. Under real-time ultrasound guidance, peripheral access was obtained in the right AC using 20 G 2.25\" [employer] IV Catheter LOT#    x 1 attempt. Blood return was present and IV flushed without difficulty. IV dressing applied, no immediate complications noted, and patient tolerated the procedure well.

## 2023-11-08 NOTE — PROGRESS NOTES
Comprehensive Nutrition Assessment    Type and Reason for Visit: Initial  Best Practice Alert for BMI <18.5    Nutrition Recommendations/Plan:   Meals and Snacks:  Diet: Continue current order  Nutrition Supplement Therapy:  Medical food supplement therapy:  Initiate Ensure Enlive three times per day (this provides 350 kcal and 20 grams protein per bottle)     Malnutrition Assessment:  Malnutrition Status: Moderate malnutrition  Context: Chronic Illness  Findings of clinical characteristics of malnutrition:   Energy Intake:  No significant decrease in energy intake (per patient report)  Weight Loss:  Greater than 10% over 6 months (15% body weight in < 4 months)     Body Fat Loss:  Mild body fat loss (Moderate) Orbital, Triceps, Buccal region   Muscle Mass Loss:  Mild muscle mass loss (Moderate) Temples (temporalis), Clavicles (pectoralis & deltoids), Hand (interosseous), Scapula (trapezius)  Fluid Accumulation:  Unable to assess     Strength:  Not Performed     Nutrition Assessment:  Nutrition History: Pt provides the following nutrition hx: Reports that her weight has been declining for the past 6 weeks; but patient has had not changes in appetite/intake over this time. Reports that she eats 2 meals per day at baseline and 1-2 ensure's per day (not sure of specific type of ensure). UBW of 113#. Pt has had significant weight loss per EMR review below. 15% body weight between 7/17 and 11/7 (CBW of 96 lbs). Wt hx: 10/23: 100#, 7/17: 113#, 4/4: 114#, 12/6/22: 112#, 11/17/22: 114#     Do You Have Any Cultural, Anabaptism, or Ethnic Food Preferences?: No   Nutrition Background:       PMH: GERD, parkinson's, orthostatic hypotension. Presented with complaints of heart palpitations. Admitted for Afib.   11/8: CATALINA/CVN  Nutrition Interval:  Pt resting in bed. Reports nutrition hx as noted above. Daughter and grandson in room. Pt reports that she has an appetite and is looking forward to eating.  Diet advanced to regular 1130 today. Discussed addition of Ensure Enlive TID with patient to supplement PO intake while in hospital; pt agreed. Discussed continuing with 2-3 high calorie ensure's at home pending weight trends. Pt agrees. Current Nutrition Therapies:  ADULT DIET; Regular; Low Fat/Low Chol/High Fiber/2 gm Na; No Caffeine    Current Intake:   Average Meal Intake: Unable to assess (previously NPO) Average Supplements Intake: Unable to assess (Previously NPO)      Anthropometric Measures:  Height: 154.9 cm (5' 0.98\")  Current Body Wt: 43.7 kg (96 lb 5.5 oz) (11/7/23), Weight source: Standing Scale  BMI: 18.2, Underweight (BMI less than 22) age over 72  Admission Body Weight: 43.7 kg (96 lb 5.5 oz) (11/7/23 standing scale)  Ideal Body Weight (Kg) (Calculated): 48 kg (105 lbs), 91.8 %  Usual Body Wt: 51.3 kg (113 lb) (7/17 OV), Percent weight change: -14.7       BMI Category Underweight (BMI less than 22) age over 72  Estimated Daily Nutrient Needs:  Energy (kcal/day): 8597-1252 (25-30 kcal/kg) (Kcal/kg Weight used: 43.7 kg Current  Protein (g/day): 55-66 (20% of energy needs)   Fluid (ml/day):   (1 ml/kcal)    Nutrition Diagnosis:   Inadequate oral intake related to  (increased energy needs) as evidenced by  (weight loss despite reports of no changes in intake/appetite)  Moderate malnutrition related to inadequate protein-energy intake as evidenced by Criteria as identified in malnutrition assessment    Nutrition Interventions:   Food and/or Nutrient Delivery: Continue Current Diet, Start Oral Nutrition Supplement     Coordination of Nutrition Care: Continue to monitor while inpatient      Goals:       Active Goal: PO intake 50% or greater, by next RD assessment       Nutrition Monitoring and Evaluation:      Food/Nutrient Intake Outcomes: Food and Nutrient Intake, Supplement Intake  Physical Signs/Symptoms Outcomes: Meal Time Behavior, Weight    Discharge Planning:    Continue Oral Nutrition Supplement    Lester Stewart RD

## 2023-11-08 NOTE — PLAN OF CARE
Problem: Discharge Planning  Goal: Discharge to home or other facility with appropriate resources  Outcome: Progressing  Flowsheets (Taken 11/7/2023 2200)  Discharge to home or other facility with appropriate resources:   Identify barriers to discharge with patient and caregiver   Identify discharge learning needs (meds, wound care, etc)   Arrange for needed discharge resources and transportation as appropriate   Refer to discharge planning if patient needs post-hospital services based on physician order or complex needs related to functional status, cognitive ability or social support system   Arrange for interpreters to assist at discharge as needed     Problem: Safety - Adult  Goal: Free from fall injury  Outcome: Progressing     Problem: ABCDS Injury Assessment  Goal: Absence of physical injury  Outcome: Progressing

## 2023-11-08 NOTE — ED NOTES
TRANSFER - OUT REPORT:    Verbal report given to erin Goldstein  being transferred to room 331 for routine progression of patient care       Report consisted of patient's Situation, Background, Assessment and   Recommendations(SBAR). Information from the following report(s) Nurse Handoff Report, ED SBAR, Adult Overview, MAR, Recent Results, Cardiac Rhythm afib, and Neuro Assessment was reviewed with the receiving nurse. [place] Fall Assessment:    Presents to emergency department  because of falls (Syncope, seizure, or loss of consciousness): No  Age > 70: Yes  Altered Mental Status, Intoxication with alcohol or substance confusion (Disorientation, impaired judgment, poor safety awaremess, or inability to follow instructions): No  Impaired Mobility: Ambulates or transfers with assistive devices or assistance; Unable to ambulate or transer.: No  Nursing Judgement: Yes          Lines:   Peripheral IV 11/07/23 Left Antecubital (Active)   Site Assessment Clean, dry & intact 11/07/23 1618   Phlebitis Assessment No symptoms 11/07/23 1618   Infiltration Assessment 0 11/07/23 1618        Opportunity for questions and clarification was provided.       Patient transported with:  Registered Nurse             Victor Manuel Rebolledo RN  11/07/23 [id number]

## 2023-11-09 ENCOUNTER — APPOINTMENT (OUTPATIENT)
Dept: CARDIAC CATH/INVASIVE PROCEDURES | Age: 83
DRG: 309 | End: 2023-11-09
Attending: INTERNAL MEDICINE
Payer: MEDICARE

## 2023-11-09 PROBLEM — I48.91 ATRIAL FIBRILLATION WITH RAPID VENTRICULAR RESPONSE (HCC): Status: ACTIVE | Noted: 2023-11-09

## 2023-11-09 LAB
ECHO BSA: 1.37 M2
EKG ATRIAL RATE: 71 BPM
EKG DIAGNOSIS: NORMAL
EKG P AXIS: 78 DEGREES
EKG P-R INTERVAL: 156 MS
EKG Q-T INTERVAL: 448 MS
EKG QRS DURATION: 70 MS
EKG QTC CALCULATION (BAZETT): 486 MS
EKG R AXIS: -12 DEGREES
EKG T AXIS: 45 DEGREES
EKG VENTRICULAR RATE: 71 BPM

## 2023-11-09 PROCEDURE — 92960 CARDIOVERSION ELECTRIC EXT: CPT

## 2023-11-09 PROCEDURE — 6360000002 HC RX W HCPCS: Performed by: INTERNAL MEDICINE

## 2023-11-09 PROCEDURE — 2580000003 HC RX 258: Performed by: INTERNAL MEDICINE

## 2023-11-09 PROCEDURE — 96366 THER/PROPH/DIAG IV INF ADDON: CPT

## 2023-11-09 PROCEDURE — 2060000000 HC ICU INTERMEDIATE R&B

## 2023-11-09 PROCEDURE — 5A2204Z RESTORATION OF CARDIAC RHYTHM, SINGLE: ICD-10-PCS | Performed by: INTERNAL MEDICINE

## 2023-11-09 PROCEDURE — 6370000000 HC RX 637 (ALT 250 FOR IP): Performed by: NURSE PRACTITIONER

## 2023-11-09 PROCEDURE — 93005 ELECTROCARDIOGRAM TRACING: CPT | Performed by: INTERNAL MEDICINE

## 2023-11-09 PROCEDURE — 2580000003 HC RX 258: Performed by: NURSE PRACTITIONER

## 2023-11-09 PROCEDURE — 6370000000 HC RX 637 (ALT 250 FOR IP): Performed by: INTERNAL MEDICINE

## 2023-11-09 PROCEDURE — B246ZZ4 ULTRASONOGRAPHY OF RIGHT AND LEFT HEART, TRANSESOPHAGEAL: ICD-10-PCS | Performed by: INTERNAL MEDICINE

## 2023-11-09 PROCEDURE — 96367 TX/PROPH/DG ADDL SEQ IV INF: CPT

## 2023-11-09 PROCEDURE — G0378 HOSPITAL OBSERVATION PER HR: HCPCS

## 2023-11-09 RX ORDER — MIDAZOLAM HYDROCHLORIDE 1 MG/ML
INJECTION INTRAMUSCULAR; INTRAVENOUS PRN
Status: COMPLETED | OUTPATIENT
Start: 2023-11-09 | End: 2023-11-09

## 2023-11-09 RX ORDER — FENTANYL CITRATE 50 UG/ML
INJECTION, SOLUTION INTRAMUSCULAR; INTRAVENOUS PRN
Status: COMPLETED | OUTPATIENT
Start: 2023-11-09 | End: 2023-11-09

## 2023-11-09 RX ADMIN — MIDAZOLAM 1 MG: 1 INJECTION INTRAMUSCULAR; INTRAVENOUS at 14:14

## 2023-11-09 RX ADMIN — MIDODRINE HYDROCHLORIDE 10 MG: 5 TABLET ORAL at 08:13

## 2023-11-09 RX ADMIN — MIDAZOLAM 2 MG: 1 INJECTION INTRAMUSCULAR; INTRAVENOUS at 14:14

## 2023-11-09 RX ADMIN — FAMOTIDINE 20 MG: 20 TABLET, FILM COATED ORAL at 08:13

## 2023-11-09 RX ADMIN — CARBIDOPA AND LEVODOPA 1 TABLET: 25; 250 TABLET ORAL at 09:03

## 2023-11-09 RX ADMIN — APIXABAN 2.5 MG: 2.5 TABLET, FILM COATED ORAL at 20:11

## 2023-11-09 RX ADMIN — AMIODARONE HYDROCHLORIDE 1 MG/MIN: 50 INJECTION, SOLUTION INTRAVENOUS at 17:39

## 2023-11-09 RX ADMIN — SODIUM CHLORIDE, PRESERVATIVE FREE 10 ML: 5 INJECTION INTRAVENOUS at 08:21

## 2023-11-09 RX ADMIN — CARBIDOPA AND LEVODOPA 1 TABLET: 25; 250 TABLET ORAL at 13:01

## 2023-11-09 RX ADMIN — AMIODARONE HYDROCHLORIDE 200 MG: 200 TABLET ORAL at 08:13

## 2023-11-09 RX ADMIN — DEXTROSE 150 MG: 50 INJECTION, SOLUTION INTRAVENOUS at 09:44

## 2023-11-09 RX ADMIN — CARBIDOPA AND LEVODOPA 1 TABLET: 25; 250 TABLET ORAL at 17:39

## 2023-11-09 RX ADMIN — MIDAZOLAM 2 MG: 1 INJECTION INTRAMUSCULAR; INTRAVENOUS at 14:08

## 2023-11-09 RX ADMIN — FLUDROCORTISONE ACETATE 0.1 MG: 0.1 TABLET ORAL at 08:13

## 2023-11-09 RX ADMIN — APIXABAN 2.5 MG: 2.5 TABLET, FILM COATED ORAL at 08:14

## 2023-11-09 RX ADMIN — OXYBUTYNIN CHLORIDE 5 MG: 5 TABLET ORAL at 20:11

## 2023-11-09 RX ADMIN — MIDODRINE HYDROCHLORIDE 10 MG: 5 TABLET ORAL at 17:39

## 2023-11-09 RX ADMIN — FENTANYL CITRATE 50 MCG: 50 INJECTION, SOLUTION INTRAMUSCULAR; INTRAVENOUS at 14:07

## 2023-11-09 RX ADMIN — DONEPEZIL HYDROCHLORIDE 10 MG: 5 TABLET, FILM COATED ORAL at 20:11

## 2023-11-09 RX ADMIN — AMIODARONE HYDROCHLORIDE 1 MG/MIN: 50 INJECTION, SOLUTION INTRAVENOUS at 09:58

## 2023-11-09 RX ADMIN — SODIUM CHLORIDE, PRESERVATIVE FREE 10 ML: 5 INJECTION INTRAVENOUS at 20:12

## 2023-11-09 RX ADMIN — CARBIDOPA AND LEVODOPA 1 TABLET: 25; 250 TABLET ORAL at 20:10

## 2023-11-09 ASSESSMENT — PAIN SCALES - GENERAL
PAINLEVEL_OUTOF10: 0

## 2023-11-09 NOTE — PROGRESS NOTES
CVN by Dr Claudean Goon  II ASA II Mallampati  5mg versed  1 shock at 360 joules synced  Post cardioversion rhythm NSR  Pt tolerated well.

## 2023-11-09 NOTE — DISCHARGE SUMMARY
[facility] Cardiology Discharge Summary     Patient ID:  Tammi Sharp  [id number]  70 y.o.  1940    Admit date: 11/7/2023    Discharge date and time:  11/9/2023    Admitting Physician: Toni Marvin MD     Discharge Physician: Dr. René Menon    Admission Diagnoses: Atrial flutter with rapid ventricular response ([address]) [I48.92]  Atrial flutter, unspecified type ([address]) [I48.92]  Atrial fibrillation with rapid ventricular response [facility]) [I48.91]    Discharge Diagnoses:   Patient Active Problem List    Diagnosis    Atrial flutter ([address])    Orthostatic hypotension    Parkinson's disease    GERD (gastroesophageal reflux disease)       Cardiology Procedures this admission: CATALINA/eCV  Consults: none    Hospital Course: Patient was admitted with complaints of palpitations and found to be in AF/flutter with RVR. Patient was scheduled for a CATALINA cardioversion at [facility] on 11/8/23. Patient was taken to the cath lab by Dr. Abilio Vallejo. Patient had CATALINA CV with no evidence of LA thrombus followed by successful eCV back to NSR. Patient tolerated the procedure well and was taken to the telemetry floor for recovery. Patient was started on amiodarone post-procedure. The morning of discharge, the patient was up feeling well without any complaints of CP, SOB or palpitations. Patient's labs were WNL. Patient was seen and examined by Dr. René Menon and determined stable and ready for discharge. Prior to DC the patient went back into rapid A Fib and required overnight IV amio and transition back to PO. The patient went back to NSR without complaints of palpitations. Patient was instructed on the importance of taking amiodarone and Apixaban (Eliquis) without missing a dose. Patient will be scheduled for follow-up with [facility] Cardiology -- Dr. René Menon in 2-4 weeks. [facility] Cardiology office has been informed you need a follow up appointment after discharge. You will be called  with the follow up appointment.  If you have NOT heard from our 5 4.0 - 12.0 %    Eosinophils % 1 0.5 - 7.8 %    Basophils % 1 0.0 - 2.0 %    Immature Granulocytes 0 0.0 - 5.0 %    Neutrophils Absolute 7.8 1.7 - 8.2 K/UL    Lymphocytes Absolute 1.0 0.5 - 4.6 K/UL    Monocytes Absolute 0.5 0.1 - 1.3 K/UL    Eosinophils Absolute 0.1 0.0 - 0.8 K/UL    Basophils Absolute 0.1 0.0 - 0.2 K/UL    Absolute Immature Granulocyte 0.0 0.0 - 0.5 K/UL   Basic Metabolic Panel w/ Reflex to MG    Collection Time: 11/10/23  4:45 AM   Result Value Ref Range    Sodium 139 133 - 143 mmol/L    Potassium 3.4 (L) 3.5 - 5.1 mmol/L    Chloride 105 101 - 110 mmol/L    CO2 28 21 - 32 mmol/L    Anion Gap 6 2 - 11 mmol/L    Glucose 136 (H) 65 - 100 mg/dL    BUN 12 8 - 23 MG/DL    Creatinine 0.90 0.6 - 1.0 MG/DL    Est, Glom Filt Rate >60 >60 ml/min/1.73m2    Calcium 8.9 8.3 - 10.4 MG/DL   Magnesium    Collection Time: 11/10/23  4:45 AM   Result Value Ref Range    Magnesium 2.0 1.8 - 2.4 mg/dL         Patient Instructions:      [facility] Cardiology office has been informed you need a follow up appointment after discharge. You will be called  with the follow up appointment. If you have NOT heard from our office after the next business day, please contact our office for appointment at [phone]. Current Discharge Medication List        START taking these medications    Details   nitroGLYCERIN (NITROSTAT) 0.4 MG SL tablet Place 1 tablet under the tongue every 5 minutes as needed for Chest pain up to max of 3 total doses. If no relief after 1 dose, call 090.   Qty: 25 tablet, Refills: 3  Start date: 11/10/2023      amiodarone (CORDARONE) 200 MG tablet Take 400 mg BID (two tablets) for 1 week, then 400 mg (two tablets) for 1 week, then take 200 mg until changed by your cardiologist  Qty: 90 tablet, Refills: 3  Start date: 11/10/2023      apixaban (ELIQUIS) 2.5 MG TABS tablet Take 1 tablet by mouth 2 times daily  Qty: 60 tablet, Refills: 3  Start date: 11/10/2023           CONTINUE these medications which have NOT

## 2023-11-09 NOTE — PROGRESS NOTES
TRANSFER - OUT REPORT:    Verbal report given to MALORIE Joy on [place]  being transferred to 3rd floor for routine post-op       Report consisted of patient's Situation, Background, Assessment and   Recommendations(SBAR). Information from the following report(s) Nurse Handoff Report was reviewed with the receiving nurse. Lines:   Peripheral IV 11/07/23 Left Antecubital (Active)   Site Assessment Clean, dry & intact 11/09/23 1222   Line Status Capped;Normal saline locked 11/09/23 1222   Line Care Connections checked and tightened 11/09/23 1222   Phlebitis Assessment No symptoms 11/09/23 1222   Infiltration Assessment 0 11/09/23 1222   Alcohol Cap Used No 11/09/23 1222   Dressing Status Clean, dry & intact 11/09/23 1222   Dressing Type Transparent 11/09/23 1222       Extended Dwell Peripherial IV 11/07/23 Right Other (Comment) (Active)   Criteria for Appropriate Use Limited/no vessel suitable for conventional peripheral access 11/09/23 1222   Site Assessment Clean, dry & intact 11/09/23 1222   Phlebitis Assessment No symptoms 11/09/23 1222   Infiltration Assessment 0 11/09/23 1222   External Catheter Length (cm) 0 cm 11/09/23 1222   Line Status Normal saline locked; Capped 11/09/23 1222   Line Care Connections checked and tightened 11/09/23 1222   Alcohol Cap Used No 11/09/23 1222   Date of Last Dressing Change 11/08/23 11/09/23 1222   Dressing Type Transparent 11/09/23 1222   Dressing Status Clean, dry & intact 11/09/23 1222   Dressing Intervention New 11/08/23 0452        Opportunity for questions and clarification was provided.       Patient transported with:  Monitor, Patient-specific medications from Pharmacy, and Registered Nurse

## 2023-11-09 NOTE — PROGRESS NOTES
Notified Zara ABEL of patient being back in a flutter after cardioversion 11/8. No new orders received.

## 2023-11-10 ENCOUNTER — APPOINTMENT (OUTPATIENT)
Dept: NON INVASIVE DIAGNOSTICS | Age: 83
DRG: 309 | End: 2023-11-10
Attending: INTERNAL MEDICINE
Payer: MEDICARE

## 2023-11-10 VITALS
DIASTOLIC BLOOD PRESSURE: 73 MMHG | RESPIRATION RATE: 20 BRPM | HEIGHT: 61 IN | WEIGHT: 95 LBS | HEART RATE: 64 BPM | TEMPERATURE: 97.4 F | SYSTOLIC BLOOD PRESSURE: 161 MMHG | OXYGEN SATURATION: 95 % | BODY MASS INDEX: 17.94 KG/M2

## 2023-11-10 PROBLEM — I95.1 ORTHOSTATIC HYPOTENSION: Status: RESOLVED | Noted: 2017-08-25 | Resolved: 2023-11-10

## 2023-11-10 PROBLEM — I48.91 ATRIAL FIBRILLATION WITH RAPID VENTRICULAR RESPONSE (HCC): Status: RESOLVED | Noted: 2023-11-09 | Resolved: 2023-11-10

## 2023-11-10 PROBLEM — I48.92 ATRIAL FLUTTER (HCC): Status: RESOLVED | Noted: 2023-11-07 | Resolved: 2023-11-10

## 2023-11-10 LAB
ANION GAP SERPL CALC-SCNC: 6 MMOL/L (ref 2–11)
BASOPHILS # BLD: 0.1 K/UL (ref 0–0.2)
BASOPHILS NFR BLD: 1 % (ref 0–2)
BUN SERPL-MCNC: 12 MG/DL (ref 8–23)
CALCIUM SERPL-MCNC: 8.9 MG/DL (ref 8.3–10.4)
CHLORIDE SERPL-SCNC: 105 MMOL/L (ref 101–110)
CO2 SERPL-SCNC: 28 MMOL/L (ref 21–32)
CREAT SERPL-MCNC: 0.9 MG/DL (ref 0.6–1)
DIFFERENTIAL METHOD BLD: ABNORMAL
ECHO AO ASC DIAM: 2.9 CM
ECHO AO ASCENDING AORTA INDEX: 2.1 CM/M2
ECHO AO ROOT DIAM: 2.3 CM
ECHO AO ROOT INDEX: 1.67 CM/M2
ECHO AV AREA PEAK VELOCITY: 1.6 CM2
ECHO AV AREA VTI: 1.4 CM2
ECHO AV AREA/BSA PEAK VELOCITY: 1.2 CM2/M2
ECHO AV AREA/BSA VTI: 1 CM2/M2
ECHO AV MEAN GRADIENT: 2 MMHG
ECHO AV MEAN VELOCITY: 0.7 M/S
ECHO AV PEAK GRADIENT: 3 MMHG
ECHO AV PEAK VELOCITY: 0.9 M/S
ECHO AV VELOCITY RATIO: 0.67
ECHO AV VTI: 23.8 CM
ECHO BSA: 1.37 M2
ECHO EST RA PRESSURE: 8 MMHG
ECHO IVC PROX: 1.8 CM
ECHO LA AREA 2C: 9.1 CM2
ECHO LA AREA 4C: 12.2 CM2
ECHO LA DIAMETER INDEX: 2.39 CM/M2
ECHO LA DIAMETER: 3.3 CM
ECHO LA MAJOR AXIS: 5.3 CM
ECHO LA MINOR AXIS: 3.4 CM
ECHO LA TO AORTIC ROOT RATIO: 1.43
ECHO LA VOL MOD A2C: 20 ML (ref 22–52)
ECHO LA VOL MOD A4C: 22 ML (ref 22–52)
ECHO LA VOLUME INDEX MOD A2C: 14 ML/M2 (ref 16–34)
ECHO LA VOLUME INDEX MOD A4C: 16 ML/M2 (ref 16–34)
ECHO LV E' LATERAL VELOCITY: 7 CM/S
ECHO LV E' SEPTAL VELOCITY: 7 CM/S
ECHO LV EDV A2C: 45 ML
ECHO LV EDV A4C: 66 ML
ECHO LV EDV INDEX A4C: 48 ML/M2
ECHO LV EDV NDEX A2C: 33 ML/M2
ECHO LV EJECTION FRACTION A2C: 57 %
ECHO LV EJECTION FRACTION A4C: 54 %
ECHO LV ESV A2C: 20 ML
ECHO LV ESV A4C: 30 ML
ECHO LV ESV INDEX A2C: 14 ML/M2
ECHO LV ESV INDEX A4C: 22 ML/M2
ECHO LV FRACTIONAL SHORTENING: 39 % (ref 28–44)
ECHO LV INTERNAL DIMENSION DIASTOLE INDEX: 2.97 CM/M2
ECHO LV INTERNAL DIMENSION DIASTOLIC: 4.1 CM (ref 3.9–5.3)
ECHO LV INTERNAL DIMENSION SYSTOLIC INDEX: 1.81 CM/M2
ECHO LV INTERNAL DIMENSION SYSTOLIC: 2.5 CM
ECHO LV IVSD: 0.5 CM (ref 0.6–0.9)
ECHO LV MASS 2D: 53.6 G (ref 67–162)
ECHO LV MASS INDEX 2D: 38.9 G/M2 (ref 43–95)
ECHO LV POSTERIOR WALL DIASTOLIC: 0.5 CM (ref 0.6–0.9)
ECHO LV RELATIVE WALL THICKNESS RATIO: 0.24
ECHO LVOT AREA: 2.3 CM2
ECHO LVOT AV VTI INDEX: 0.61
ECHO LVOT DIAM: 1.7 CM
ECHO LVOT MEAN GRADIENT: 1 MMHG
ECHO LVOT MEAN GRADIENT: 1 MMHG
ECHO LVOT PEAK GRADIENT: 2 MMHG
ECHO LVOT PEAK VELOCITY: 0.6 M/S
ECHO LVOT STROKE VOLUME INDEX: 24 ML/M2
ECHO LVOT SV: 33.1 ML
ECHO LVOT VTI: 14.6 CM
ECHO MV A VELOCITY: 0.24 M/S
ECHO MV E DECELERATION TIME (DT): 218 MS
ECHO MV E VELOCITY: 0.99 M/S
ECHO MV E/A RATIO: 4.13
ECHO MV E/E' LATERAL: 14.14
ECHO MV REGURGITANT PEAK GRADIENT: 88 MMHG
ECHO MV REGURGITANT PEAK VELOCITY: 4.7 M/S
ECHO MV REGURGITANT VTIA: 172 CM
ECHO PULMONARY ARTERY END DIASTOLIC PRESSURE: 1 MMHG
ECHO PV ACCELERATION TIME (AT): 95 MS
ECHO PV MAX VELOCITY: 0.5 M/S
ECHO PV PEAK GRADIENT: 1 MMHG
ECHO PV REGURGITANT MAX VELOCITY: 0.6 M/S
ECHO RIGHT VENTRICULAR SYSTOLIC PRESSURE (RVSP): 47 MMHG
ECHO RV INTERNAL DIMENSION: 2.4 CM
ECHO RV TAPSE: 2.1 CM (ref 1.7–?)
ECHO TV REGURGITANT MAX VELOCITY: 3.12 M/S
ECHO TV REGURGITANT PEAK GRADIENT: 39 MMHG
EOSINOPHIL # BLD: 0.1 K/UL (ref 0–0.8)
EOSINOPHIL NFR BLD: 1 % (ref 0.5–7.8)
ERYTHROCYTE [DISTWIDTH] IN BLOOD BY AUTOMATED COUNT: 15.3 % (ref 11.9–14.6)
GLUCOSE SERPL-MCNC: 136 MG/DL (ref 65–100)
HCT VFR BLD AUTO: 35.6 % (ref 35.8–46.3)
HGB BLD-MCNC: 11.3 G/DL (ref 11.7–15.4)
IMM GRANULOCYTES # BLD AUTO: 0 K/UL (ref 0–0.5)
IMM GRANULOCYTES NFR BLD AUTO: 0 % (ref 0–5)
LYMPHOCYTES # BLD: 1 K/UL (ref 0.5–4.6)
LYMPHOCYTES NFR BLD: 11 % (ref 13–44)
MAGNESIUM SERPL-MCNC: 2 MG/DL (ref 1.8–2.4)
MCH RBC QN AUTO: 27.8 PG (ref 26.1–32.9)
MCHC RBC AUTO-ENTMCNC: 31.7 G/DL (ref 31.4–35)
MCV RBC AUTO: 87.5 FL (ref 82–102)
MONOCYTES # BLD: 0.5 K/UL (ref 0.1–1.3)
MONOCYTES NFR BLD: 5 % (ref 4–12)
NEUTS SEG # BLD: 7.8 K/UL (ref 1.7–8.2)
NEUTS SEG NFR BLD: 82 % (ref 43–78)
NRBC # BLD: 0 K/UL (ref 0–0.2)
PLATELET # BLD AUTO: 236 K/UL (ref 150–450)
PMV BLD AUTO: 12.9 FL (ref 9.4–12.3)
POTASSIUM SERPL-SCNC: 3.4 MMOL/L (ref 3.5–5.1)
RBC # BLD AUTO: 4.07 M/UL (ref 4.05–5.2)
SODIUM SERPL-SCNC: 139 MMOL/L (ref 133–143)
WBC # BLD AUTO: 9.6 K/UL (ref 4.3–11.1)

## 2023-11-10 PROCEDURE — 93306 TTE W/DOPPLER COMPLETE: CPT

## 2023-11-10 PROCEDURE — 6360000002 HC RX W HCPCS: Performed by: INTERNAL MEDICINE

## 2023-11-10 PROCEDURE — 85025 COMPLETE CBC W/AUTO DIFF WBC: CPT

## 2023-11-10 PROCEDURE — 83735 ASSAY OF MAGNESIUM: CPT

## 2023-11-10 PROCEDURE — 2580000003 HC RX 258: Performed by: INTERNAL MEDICINE

## 2023-11-10 PROCEDURE — 80048 BASIC METABOLIC PNL TOTAL CA: CPT

## 2023-11-10 PROCEDURE — 36415 COLL VENOUS BLD VENIPUNCTURE: CPT

## 2023-11-10 PROCEDURE — 6370000000 HC RX 637 (ALT 250 FOR IP): Performed by: NURSE PRACTITIONER

## 2023-11-10 PROCEDURE — 6370000000 HC RX 637 (ALT 250 FOR IP): Performed by: INTERNAL MEDICINE

## 2023-11-10 PROCEDURE — 93306 TTE W/DOPPLER COMPLETE: CPT | Performed by: INTERNAL MEDICINE

## 2023-11-10 RX ORDER — NITROGLYCERIN 0.4 MG/1
0.4 TABLET SUBLINGUAL EVERY 5 MIN PRN
Qty: 25 TABLET | Refills: 3 | Status: SHIPPED | OUTPATIENT
Start: 2023-11-10

## 2023-11-10 RX ORDER — AMIODARONE HYDROCHLORIDE 200 MG/1
TABLET ORAL
Qty: 90 TABLET | Refills: 3 | Status: SHIPPED | OUTPATIENT
Start: 2023-11-10

## 2023-11-10 RX ADMIN — FAMOTIDINE 20 MG: 20 TABLET, FILM COATED ORAL at 09:21

## 2023-11-10 RX ADMIN — AMIODARONE HYDROCHLORIDE 1 MG/MIN: 50 INJECTION, SOLUTION INTRAVENOUS at 01:22

## 2023-11-10 RX ADMIN — AMIODARONE HYDROCHLORIDE 200 MG: 200 TABLET ORAL at 09:20

## 2023-11-10 RX ADMIN — FLUDROCORTISONE ACETATE 0.1 MG: 0.1 TABLET ORAL at 09:20

## 2023-11-10 RX ADMIN — CARBIDOPA AND LEVODOPA 1 TABLET: 25; 250 TABLET ORAL at 09:20

## 2023-11-10 RX ADMIN — AMIODARONE HYDROCHLORIDE 1 MG/MIN: 50 INJECTION, SOLUTION INTRAVENOUS at 08:54

## 2023-11-10 RX ADMIN — APIXABAN 2.5 MG: 2.5 TABLET, FILM COATED ORAL at 09:21

## 2023-11-10 ASSESSMENT — PAIN SCALES - GENERAL: PAINLEVEL_OUTOF10: 0

## 2023-11-10 NOTE — PROGRESS NOTES
Pt and  educated on d/c. Pt ambulated with no issues. All questions answered and packet handed to pt.

## 2023-11-10 NOTE — PROGRESS NOTES
Pt began throwing up as she was being discharged. Pt threw up this morning as well and by the time I got to the room the pt states she is fine and that it sometimes happens at home and once she throws up she feels better. MD notified and stated to have her follow up with her PCP once home due to it not being an acute issue.

## 2023-11-13 ENCOUNTER — TELEPHONE (OUTPATIENT)
Dept: FAMILY MEDICINE CLINIC | Facility: CLINIC | Age: 83
End: 2023-11-13

## 2023-11-13 ENCOUNTER — CARE COORDINATION (OUTPATIENT)
Dept: CARE COORDINATION | Facility: CLINIC | Age: 83
End: 2023-11-13

## 2023-11-13 NOTE — PROGRESS NOTES
Mild muscle mass loss (Moderate) Temples (temporalis),   Clavicles (pectoralis & deltoids), Hand (interosseous), Scapula (trapezius)  Treatment: RD consult, continue current diet, Start Oral Nutrition Supplement    ASPEN Criteria:    https://aspenjournals. onlinelibrary. duenas. com/doi/full/10.1177/488776059805471  5    Thank you,  Mandy Strickland RN, BSN, CDI  Cinthia [email or web address]  . Options provided:  -- Protein calorie malnutrition moderate  -- Underweight with BMI 17.96  -- Other - I will add my own diagnosis  -- Disagree - Not applicable / Not valid  -- Disagree - Clinically unable to determine / Unknown  -- Refer to Clinical Documentation Reviewer    PROVIDER RESPONSE TEXT:    This patient has moderate protein calorie malnutrition.     Query created by: Jose reyes 11/10/2023 9:31 AM      Electronically signed by:  Emanuel Darby NP 11/13/2023 12:44 PM

## 2023-11-13 NOTE — CARE COORDINATION
administration of home medications. Medications reviewed, 1111F entered: N/A    Was patient discharged with a pulse oximeter? no    Non-face-to-face services provided:  Scheduled appointment with PCP-office will call  Obtained and reviewed discharge summary and/or continuity of care documents    Offered patient enrollment in the Remote Patient Monitoring (RPM) program for in-home monitoring:  na .    Care Transitions 24 Hour Call    Schedule Follow Up Appointment with PCP: Completed  Do you have a copy of your discharge instructions?: Yes  Do you have all of your prescriptions and are they filled?: Yes  Have you been contacted by a [facility] Pharmacist?: No  Have you scheduled your follow up appointment?: Yes  How are you going to get to your appointment?: Car - family or friend to transport  Do you have support at home?: Partner/Spouse/SO  Do you feel like you have everything you need to keep you well at home?: Yes  Care Transitions Interventions  No Identified Needs                                   Discussed follow-up appointments. If no appointment was previously scheduled, appointment scheduling offered: Yes. Is follow up appointment scheduled within 7 days of discharge? Per discharge plan. Follow Up  Future Appointments   Date Time Provider [address]   11/30/2023  3:15 PM Saeid Nice MD [facility] AMB   12/5/2023  2:30 PM Elsi Durant MD A [facility] AMB   12/19/2023  2:30 PM Lawyer Kenrick MD BSNI [facility] AMB       [facility] Care Coordinator provided contact information. Plan for follow-up call in 5-7 days based on severity of symptoms and risk factors.   Plan for next call: follow-up appointment-assess scheduling and/or attendance    Jaquan Brizuela LPN

## 2023-11-13 NOTE — TELEPHONE ENCOUNTER
[facility] asking if nurse can call . Pt recently discharged from hospital for A-Flutter.  Asking if Bhargav needs to see her but would like a call back at [phone]

## 2023-11-16 ENCOUNTER — OFFICE VISIT (OUTPATIENT)
Dept: FAMILY MEDICINE CLINIC | Facility: CLINIC | Age: 83
End: 2023-11-16
Payer: MEDICARE

## 2023-11-16 VITALS
DIASTOLIC BLOOD PRESSURE: 51 MMHG | OXYGEN SATURATION: 99 % | RESPIRATION RATE: 16 BRPM | TEMPERATURE: 98.3 F | SYSTOLIC BLOOD PRESSURE: 125 MMHG | BODY MASS INDEX: 18.28 KG/M2 | WEIGHT: 96.8 LBS | HEIGHT: 61 IN | HEART RATE: 75 BPM

## 2023-11-16 DIAGNOSIS — I48.91 ATRIAL FIBRILLATION, UNSPECIFIED TYPE (HCC): ICD-10-CM

## 2023-11-16 DIAGNOSIS — R30.0 DYSURIA: Primary | ICD-10-CM

## 2023-11-16 DIAGNOSIS — I48.92 ATRIAL FLUTTER, UNSPECIFIED TYPE (HCC): ICD-10-CM

## 2023-11-16 DIAGNOSIS — G20.A1 PARKINSON'S DISEASE WITHOUT DYSKINESIA, UNSPECIFIED WHETHER MANIFESTATIONS FLUCTUATE: ICD-10-CM

## 2023-11-16 DIAGNOSIS — M25.552 LEFT HIP PAIN: ICD-10-CM

## 2023-11-16 DIAGNOSIS — E55.9 VITAMIN D DEFICIENCY, UNSPECIFIED: ICD-10-CM

## 2023-11-16 DIAGNOSIS — B37.0 THRUSH: ICD-10-CM

## 2023-11-16 LAB
ALBUMIN SERPL-MCNC: 3.8 G/DL (ref 3.2–4.6)
ALBUMIN/GLOB SERPL: 1 (ref 0.4–1.6)
ALP SERPL-CCNC: 104 U/L (ref 50–136)
ALT SERPL-CCNC: 21 U/L (ref 12–65)
ANION GAP SERPL CALC-SCNC: 8 MMOL/L (ref 2–11)
AST SERPL-CCNC: 12 U/L (ref 15–37)
BILIRUB SERPL-MCNC: 0.4 MG/DL (ref 0.2–1.1)
BUN SERPL-MCNC: 19 MG/DL (ref 8–23)
CALCIUM SERPL-MCNC: 9.2 MG/DL (ref 8.3–10.4)
CHLORIDE SERPL-SCNC: 103 MMOL/L (ref 101–110)
CO2 SERPL-SCNC: 32 MMOL/L (ref 21–32)
CREAT SERPL-MCNC: 1.3 MG/DL (ref 0.6–1)
GLOBULIN SER CALC-MCNC: 3.9 G/DL (ref 2.8–4.5)
GLUCOSE SERPL-MCNC: 62 MG/DL (ref 65–100)
MAGNESIUM SERPL-MCNC: 2.1 MG/DL (ref 1.8–2.4)
POTASSIUM SERPL-SCNC: 3.1 MMOL/L (ref 3.5–5.1)
PROT SERPL-MCNC: 7.7 G/DL (ref 6.3–8.2)
SODIUM SERPL-SCNC: 143 MMOL/L (ref 133–143)
TSH, 3RD GENERATION: 5.53 UIU/ML (ref 0.36–3.74)

## 2023-11-16 PROCEDURE — G8484 FLU IMMUNIZE NO ADMIN: HCPCS | Performed by: FAMILY MEDICINE

## 2023-11-16 PROCEDURE — G8427 DOCREV CUR MEDS BY ELIG CLIN: HCPCS | Performed by: FAMILY MEDICINE

## 2023-11-16 PROCEDURE — 1111F DSCHRG MED/CURRENT MED MERGE: CPT | Performed by: FAMILY MEDICINE

## 2023-11-16 PROCEDURE — 99214 OFFICE O/P EST MOD 30 MIN: CPT | Performed by: FAMILY MEDICINE

## 2023-11-16 PROCEDURE — G8419 CALC BMI OUT NRM PARAM NOF/U: HCPCS | Performed by: FAMILY MEDICINE

## 2023-11-16 PROCEDURE — 1123F ACP DISCUSS/DSCN MKR DOCD: CPT | Performed by: FAMILY MEDICINE

## 2023-11-16 PROCEDURE — 1090F PRES/ABSN URINE INCON ASSESS: CPT | Performed by: FAMILY MEDICINE

## 2023-11-16 PROCEDURE — 1036F TOBACCO NON-USER: CPT | Performed by: FAMILY MEDICINE

## 2023-11-16 PROCEDURE — G8399 PT W/DXA RESULTS DOCUMENT: HCPCS | Performed by: FAMILY MEDICINE

## 2023-11-16 RX ORDER — SUCRALFATE 1 G/1
TABLET ORAL
Qty: 360 TABLET | Refills: 3 | Status: SHIPPED | OUTPATIENT
Start: 2023-11-16

## 2023-11-16 RX ORDER — BETAMETHASONE DIPROPIONATE 0.05 %
OINTMENT (GRAM) TOPICAL
COMMUNITY
Start: 2023-11-11

## 2023-11-16 RX ORDER — TRAMADOL HYDROCHLORIDE 50 MG/1
50 TABLET ORAL EVERY 6 HOURS PRN
COMMUNITY

## 2023-11-16 ASSESSMENT — ENCOUNTER SYMPTOMS: SHORTNESS OF BREATH: 0

## 2023-11-17 RX ORDER — POTASSIUM CHLORIDE 20 MEQ/1
20 TABLET, EXTENDED RELEASE ORAL 2 TIMES DAILY
Qty: 180 TABLET | Refills: 3 | Status: SHIPPED | OUTPATIENT
Start: 2023-11-17

## 2023-11-17 NOTE — RESULT ENCOUNTER NOTE
The potassium is still low at 3.1, her magnesium level was normal.  The TSH was increased but since she just went on the amiodarone will wait before starting additional medication and I may recheck that along with some additional thyroid labs at follow-up next month.   Can we confirm with the patient or her  due to memory loss if she is taking the current potassium 20 mill equivalents twice daily and let me know what she says

## 2023-11-21 ENCOUNTER — NURSE ONLY (OUTPATIENT)
Dept: FAMILY MEDICINE CLINIC | Facility: CLINIC | Age: 83
End: 2023-11-21

## 2023-11-21 ENCOUNTER — CARE COORDINATION (OUTPATIENT)
Dept: CARE COORDINATION | Facility: CLINIC | Age: 83
End: 2023-11-21

## 2023-11-21 DIAGNOSIS — R30.0 DYSURIA: ICD-10-CM

## 2023-11-21 NOTE — CARE COORDINATION
Care Transitions Follow Up Call    Patient Current Location:  Home: [address] [place] [id number]    [facility] Care Coordinator contacted the patient by telephone to follow up after admission on . Verified name and  with patient as identifiers. Patient: Jarrod Haynes  Patient : 1940   MRN: [id number]  Reason for Admission: Atrial flutter  Discharge Date: 11/10/23 RARS: Readmission Risk Score: 11.4      Needs to be reviewed by the provider   Additional needs identified to be addressed with provider: No  none             Method of communication with provider: none. Patient states she is progressing well and had a nice visit with her PCP. Patient reports she is taking medication as directed. Denies new needs or concerns at this time. Addressed changes since last contact:  none  Discussed follow-up appointments. If no appointment was previously scheduled, appointment scheduling offered: Yes. Is follow up appointment scheduled within 7 days of discharge? Yes. Follow Up  Future Appointments   Date Time Provider [address] [place] [address]   2023  3:15 PM Renaldo Olson MD UCDG [facility] AMB   2023  2:30 PM Katherine Durant MD FPKELSEY [facility] AMB   2023  2:30 PM Hoang Nuñez MD NI [facility] AMB     External follow up appointment(s): cristela    [facility] Care Coordinator reviewed medical action plan with patient and discussed any barriers to care and/or understanding of plan of care after discharge. Discussed appropriate site of care based on symptoms and resources available to patient including: PCP  Specialist  When to call 911. The patient agrees to contact the PCP office for questions related to their healthcare. Advance Care Planning:   reviewed and current.      Patients top risk factors for readmission:  comorbidities  Interventions to address risk factors:  continued compliance with plan of care    Offered patient enrollment in the Remote Patient Monitoring (RPM) program for

## 2023-11-24 ENCOUNTER — TELEPHONE (OUTPATIENT)
Dept: FAMILY MEDICINE CLINIC | Facility: CLINIC | Age: 83
End: 2023-11-24

## 2023-11-24 LAB
BACTERIA SPEC CULT: NORMAL
BACTERIA SPEC CULT: NORMAL
SERVICE CMNT-IMP: NORMAL

## 2023-11-28 ENCOUNTER — TELEPHONE (OUTPATIENT)
Dept: FAMILY MEDICINE CLINIC | Facility: CLINIC | Age: 83
End: 2023-11-28

## 2023-11-28 DIAGNOSIS — T14.8XXA WOUND INFECTION: Primary | ICD-10-CM

## 2023-11-28 DIAGNOSIS — L08.9 WOUND INFECTION: Primary | ICD-10-CM

## 2023-11-28 RX ORDER — DOXYCYCLINE HYCLATE 100 MG/1
100 CAPSULE ORAL 2 TIMES DAILY
Qty: 20 CAPSULE | Refills: 0 | Status: SHIPPED | OUTPATIENT
Start: 2023-11-28 | End: 2023-12-08

## 2023-11-28 NOTE — TELEPHONE ENCOUNTER
Patient had a skin tear on her leg and was seen in the ER on November 22 at [facility]. She was placed on Cefzil but still has some redness according to the  around the wound. She has an appointment with me next week. Will switch her to doxycycline and see if that provides better coverage.  Sent in prescription for:     Requested Prescriptions     Signed Prescriptions Disp Refills    doxycycline hyclate (VIBRAMYCIN) 100 MG capsule 20 capsule 0     Sig: Take 1 capsule by mouth 2 times daily for 10 days     Authorizing Provider: Rosario Ravi did inform the  about the new prescription

## 2023-11-29 ENCOUNTER — TELEPHONE (OUTPATIENT)
Age: 83
End: 2023-11-29

## 2023-11-29 ENCOUNTER — CARE COORDINATION (OUTPATIENT)
Dept: CARE COORDINATION | Facility: CLINIC | Age: 83
End: 2023-11-29

## 2023-11-29 RX ORDER — AMIODARONE HYDROCHLORIDE 200 MG/1
TABLET ORAL
Qty: 30 TABLET | Refills: 0 | OUTPATIENT
Start: 2023-11-29

## 2023-11-29 NOTE — CARE COORDINATION
compliance with all plans of care    Offered patient enrollment in the Remote Patient Monitoring (RPM) program for in-home monitoring:  na .     Care Transitions Subsequent and Final Call    Schedule Follow Up Appointment with PCP: Completed  Subsequent and Final Calls  Do you have any ongoing symptoms?: No  Have your medications changed?: No  Do you have any questions related to your medications?: No  Do you currently have any active services?: No  Do you have any needs or concerns that I can assist you with?: No  Identified Barriers: None  Care Transitions Interventions  No Identified Needs                          Other Interventions:             [facility] Care Coordinator provided contact information for future needs. Plan for follow-up call in 10-14 days based on severity of symptoms and risk factors.   Plan for next call:  compliance with plan of care    Danielle Velázquez LPN

## 2023-11-29 NOTE — TELEPHONE ENCOUNTER
[facility] called in for amiodarone 200MG and eliquis 2.5MG needing a 90 day supply with 3 refills to be sent.  Call [phone] with any questions

## 2023-11-30 ENCOUNTER — OFFICE VISIT (OUTPATIENT)
Age: 83
End: 2023-11-30

## 2023-11-30 VITALS
WEIGHT: 93 LBS | HEART RATE: 71 BPM | SYSTOLIC BLOOD PRESSURE: 128 MMHG | DIASTOLIC BLOOD PRESSURE: 78 MMHG | HEIGHT: 61 IN | BODY MASS INDEX: 17.56 KG/M2

## 2023-11-30 DIAGNOSIS — Z79.01 ANTICOAGULANT LONG-TERM USE: ICD-10-CM

## 2023-11-30 DIAGNOSIS — Z79.899 LONG TERM CURRENT USE OF ANTIARRHYTHMIC DRUG: ICD-10-CM

## 2023-11-30 DIAGNOSIS — I48.92 ATRIAL FLUTTER, UNSPECIFIED TYPE (HCC): Primary | ICD-10-CM

## 2023-11-30 ASSESSMENT — ENCOUNTER SYMPTOMS
ORTHOPNEA: 0
DOUBLE VISION: 0
COUGH: 0
NAUSEA: 0
BLURRED VISION: 0
HEMOPTYSIS: 0
ABDOMINAL PAIN: 0
VOMITING: 0
SHORTNESS OF BREATH: 0
BLOATING: 0
BACK PAIN: 0

## 2023-11-30 NOTE — PROGRESS NOTES
related. Negative work-up at that time. States issues with palpitations noted at night; couple times/week; vague historian. With CP-states pain woke her up with  radiation to neck lasting about 25 minutes; no exertional changes and went to the ER. Has had one episode since then. Some low blood pressures but improved on therapy/compression hoses. Stable symptoms and doing well on midodrine/Florinef. Last noted vascular surgery evaluation from 11/19 with mild disease bilaterally. Intermittent episodes of positional dizziness. No recent syncope. Hypertension-controlled, syncope-stable, dizziness-controlled, palpitations-controlled, chest discomfort-controlled, atrial flutter-stable       Past Medical History, Past Surgical History, Family history, Social History, and Medications were all reviewed with the patient today and updated as necessary.      Allergies   Allergen Reactions    Aspirin Other (See Comments)     Painful stomach-- Hx of PUD    Levothyroxine Hives    Morphine Other (See Comments)     Hallucinations     Patient Active Problem List   Diagnosis    Ampullary adenoma    Adenomyoma of gallbladder    Parkinson's disease    Environmental and seasonal allergies    Pulmonary infiltrates    Other dysphagia    Ilene-ampullary neoplasm    Chronic fatigue    Cognitive deficit due to Parkinson's disease    Cervical stenosis of spine    Tremor    Neurologic orthostatic hypotension (HCC)    Asthma    GERD (gastroesophageal reflux disease)    Atrial flutter (HCC)     Past Medical History:   Diagnosis Date    Arthritis     oa-  knees and elbows    Asthma     proair and neb     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

## 2023-12-05 ENCOUNTER — OFFICE VISIT (OUTPATIENT)
Dept: FAMILY MEDICINE CLINIC | Facility: CLINIC | Age: 83
End: 2023-12-05
Payer: MEDICARE

## 2023-12-05 VITALS
RESPIRATION RATE: 16 BRPM | WEIGHT: 97.4 LBS | TEMPERATURE: 98.3 F | HEIGHT: 61 IN | OXYGEN SATURATION: 98 % | DIASTOLIC BLOOD PRESSURE: 53 MMHG | BODY MASS INDEX: 18.39 KG/M2 | HEART RATE: 64 BPM | SYSTOLIC BLOOD PRESSURE: 166 MMHG

## 2023-12-05 DIAGNOSIS — L08.9 WOUND INFECTION: Primary | ICD-10-CM

## 2023-12-05 DIAGNOSIS — T14.8XXA WOUND INFECTION: Primary | ICD-10-CM

## 2023-12-05 PROCEDURE — G8419 CALC BMI OUT NRM PARAM NOF/U: HCPCS | Performed by: FAMILY MEDICINE

## 2023-12-05 PROCEDURE — G8484 FLU IMMUNIZE NO ADMIN: HCPCS | Performed by: FAMILY MEDICINE

## 2023-12-05 PROCEDURE — 99213 OFFICE O/P EST LOW 20 MIN: CPT | Performed by: FAMILY MEDICINE

## 2023-12-05 PROCEDURE — 1123F ACP DISCUSS/DSCN MKR DOCD: CPT | Performed by: FAMILY MEDICINE

## 2023-12-05 PROCEDURE — 1036F TOBACCO NON-USER: CPT | Performed by: FAMILY MEDICINE

## 2023-12-05 PROCEDURE — G8399 PT W/DXA RESULTS DOCUMENT: HCPCS | Performed by: FAMILY MEDICINE

## 2023-12-05 PROCEDURE — 1090F PRES/ABSN URINE INCON ASSESS: CPT | Performed by: FAMILY MEDICINE

## 2023-12-05 PROCEDURE — G8427 DOCREV CUR MEDS BY ELIG CLIN: HCPCS | Performed by: FAMILY MEDICINE

## 2023-12-05 PROCEDURE — 1111F DSCHRG MED/CURRENT MED MERGE: CPT | Performed by: FAMILY MEDICINE

## 2023-12-05 RX ORDER — DOXYCYCLINE HYCLATE 100 MG/1
100 CAPSULE ORAL 2 TIMES DAILY
Qty: 14 CAPSULE | Refills: 0 | Status: SHIPPED | OUTPATIENT
Start: 2023-12-05 | End: 2023-12-12

## 2023-12-05 RX ORDER — CARBIDOPA/LEVODOPA 25MG-250MG
1 TABLET ORAL 4 TIMES DAILY
Qty: 360 TABLET | Refills: 3 | Status: SHIPPED | OUTPATIENT
Start: 2023-12-05

## 2023-12-05 RX ORDER — CARBIDOPA/LEVODOPA 25MG-250MG
TABLET ORAL
Qty: 360 TABLET | Refills: 3 | OUTPATIENT
Start: 2023-12-05

## 2023-12-06 NOTE — PROGRESS NOTES
Subjective:      Patient ID: Gregory House is a 78 y.o. female. HPI  Patient comes in today for follow-up on a skin tear on her left anterior tibia. She initially was put on Cefzil but we switched her to doxycycline because of some increased redness. It seems to be doing better and there is been no drainage noted. 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 James Mcnally at [facility] cardio     Parkinson's disease dx 2009    followed by [facility] Neurology ; on med for control     Psychiatric disorder     anxiety: PRN meds    PUD (peptic ulcer disease)     SVT (supraventricular tachycardia)     Syncope and collapse     last episode 2 months ago (noted 1/20/21)    Unspecified adverse effect of anesthesia     delayed awakening---     Weight loss        Allergies   Allergen Reactions    Aspirin Other (See Comments)     Painful stomach-- Hx of PUD    Levothyroxine Hives    Morphine Other (See Comments)     Hallucinations       Current Outpatient Medications   Medication Sig Dispense Refill    doxycycline hyclate (VIBRAMYCIN) 100 MG capsule Take 1 capsule by mouth 2 times daily for 7 days 14 capsule 0    carbidopa-levodopa (SINEMET)  MG per tablet Take 1 tablet by mouth 4 times daily 360 tablet 3    potassium chloride (KLOR-CON M) 20 MEQ extended release tablet Take 1 tablet by mouth 2 times daily 180 tablet 3    betamethasone dipropionate 0.05 % ointment       traMADol (ULTRAM) 50 MG tablet Take 1 tablet by mouth every 6 hours as needed for Pain.

## 2023-12-12 ENCOUNTER — CARE COORDINATION (OUTPATIENT)
Dept: CARE COORDINATION | Facility: CLINIC | Age: 83
End: 2023-12-12

## 2023-12-12 NOTE — CARE COORDINATION
Patient has graduated from the Care Transitions program on 12.12.23. Patient/family has the ability to self-manage at this time. Patient has no further care management needs, no referral to the [facility]note 3[facility] team for further management. Patient has [facility] Care Coordinator's contact information for any further questions, concerns, or needs.   Patients upcoming visits:    Future Appointments   Date Time Provider [address]   12/19/2023 10:00 AM Sudheer Garner PA-C FPA [facility]ENYS AMB   12/19/2023  2:30 PM Rocky Monet MD NI GV AMB

## 2023-12-26 DIAGNOSIS — N95.2 ATROPHIC VAGINITIS: ICD-10-CM

## 2023-12-26 RX ORDER — CONJUGATED ESTROGENS 0.62 MG/G
CREAM VAGINAL
Qty: 30 G | Refills: 1 | Status: SHIPPED | OUTPATIENT
Start: 2023-12-26

## 2024-01-13 ASSESSMENT — PATIENT HEALTH QUESTIONNAIRE - PHQ9
SUM OF ALL RESPONSES TO PHQ QUESTIONS 1-9: 0
SUM OF ALL RESPONSES TO PHQ QUESTIONS 1-9: 0
1. LITTLE INTEREST OR PLEASURE IN DOING THINGS: 0
SUM OF ALL RESPONSES TO PHQ9 QUESTIONS 1 & 2: 0
SUM OF ALL RESPONSES TO PHQ QUESTIONS 1-9: 0
SUM OF ALL RESPONSES TO PHQ9 QUESTIONS 1 & 2: 0
1. LITTLE INTEREST OR PLEASURE IN DOING THINGS: NOT AT ALL
SUM OF ALL RESPONSES TO PHQ QUESTIONS 1-9: 0
2. FEELING DOWN, DEPRESSED OR HOPELESS: NOT AT ALL
2. FEELING DOWN, DEPRESSED OR HOPELESS: 0

## 2024-01-16 ENCOUNTER — OFFICE VISIT (OUTPATIENT)
Dept: FAMILY MEDICINE CLINIC | Facility: CLINIC | Age: 84
End: 2024-01-16
Payer: MEDICARE

## 2024-01-16 VITALS
OXYGEN SATURATION: 99 % | HEIGHT: 61 IN | TEMPERATURE: 98.3 F | WEIGHT: 90.8 LBS | BODY MASS INDEX: 17.14 KG/M2 | HEART RATE: 81 BPM | DIASTOLIC BLOOD PRESSURE: 52 MMHG | SYSTOLIC BLOOD PRESSURE: 131 MMHG | RESPIRATION RATE: 16 BRPM

## 2024-01-16 DIAGNOSIS — K52.9 COLITIS: ICD-10-CM

## 2024-01-16 DIAGNOSIS — R79.89 ABNORMAL TSH: ICD-10-CM

## 2024-01-16 DIAGNOSIS — R63.4 WEIGHT LOSS: ICD-10-CM

## 2024-01-16 DIAGNOSIS — C17.0 MALIGNANT NEOPLASM OF DUODENUM (HCC): ICD-10-CM

## 2024-01-16 DIAGNOSIS — S41.112D SKIN TEAR OF LEFT UPPER ARM WITHOUT COMPLICATION, SUBSEQUENT ENCOUNTER: ICD-10-CM

## 2024-01-16 DIAGNOSIS — R10.9 ABDOMINAL CRAMPING: ICD-10-CM

## 2024-01-16 DIAGNOSIS — I48.92 ATRIAL FLUTTER, UNSPECIFIED TYPE (HCC): ICD-10-CM

## 2024-01-16 DIAGNOSIS — R79.89 ABNORMAL TSH: Primary | ICD-10-CM

## 2024-01-16 DIAGNOSIS — G20.A1 PARKINSON'S DISEASE, UNSPECIFIED WHETHER DYSKINESIA PRESENT, UNSPECIFIED WHETHER MANIFESTATIONS FLUCTUATE: ICD-10-CM

## 2024-01-16 DIAGNOSIS — L29.9 GENERALIZED PRURITUS: ICD-10-CM

## 2024-01-16 LAB
ALBUMIN SERPL-MCNC: 3.7 G/DL (ref 3.2–4.6)
ALBUMIN/GLOB SERPL: 1.2 (ref 0.4–1.6)
ALP SERPL-CCNC: 183 U/L (ref 50–136)
ALT SERPL-CCNC: 22 U/L (ref 12–65)
ANION GAP SERPL CALC-SCNC: 4 MMOL/L (ref 2–11)
AST SERPL-CCNC: 11 U/L (ref 15–37)
BASOPHILS # BLD: 0.1 K/UL (ref 0–0.2)
BASOPHILS NFR BLD: 1 % (ref 0–2)
BILIRUB SERPL-MCNC: 0.2 MG/DL (ref 0.2–1.1)
BUN SERPL-MCNC: 24 MG/DL (ref 8–23)
CALCIUM SERPL-MCNC: 9.7 MG/DL (ref 8.3–10.4)
CHLORIDE SERPL-SCNC: 105 MMOL/L (ref 103–113)
CO2 SERPL-SCNC: 30 MMOL/L (ref 21–32)
CREAT SERPL-MCNC: 1.1 MG/DL (ref 0.6–1)
DIFFERENTIAL METHOD BLD: ABNORMAL
EOSINOPHIL # BLD: 0.1 K/UL (ref 0–0.8)
EOSINOPHIL NFR BLD: 1 % (ref 0.5–7.8)
ERYTHROCYTE [DISTWIDTH] IN BLOOD BY AUTOMATED COUNT: 18.2 % (ref 11.9–14.6)
GLOBULIN SER CALC-MCNC: 3 G/DL (ref 2.8–4.5)
GLUCOSE SERPL-MCNC: 84 MG/DL (ref 65–100)
HCT VFR BLD AUTO: 36.4 % (ref 35.8–46.3)
HGB BLD-MCNC: 11.3 G/DL (ref 11.7–15.4)
IMM GRANULOCYTES # BLD AUTO: 0 K/UL (ref 0–0.5)
IMM GRANULOCYTES NFR BLD AUTO: 0 % (ref 0–5)
LYMPHOCYTES # BLD: 1 K/UL (ref 0.5–4.6)
LYMPHOCYTES NFR BLD: 13 % (ref 13–44)
MCH RBC QN AUTO: 28.7 PG (ref 26.1–32.9)
MCHC RBC AUTO-ENTMCNC: 31 G/DL (ref 31.4–35)
MCV RBC AUTO: 92.4 FL (ref 82–102)
MONOCYTES # BLD: 0.7 K/UL (ref 0.1–1.3)
MONOCYTES NFR BLD: 9 % (ref 4–12)
NEUTS SEG # BLD: 5.9 K/UL (ref 1.7–8.2)
NEUTS SEG NFR BLD: 76 % (ref 43–78)
NRBC # BLD: 0 K/UL (ref 0–0.2)
PLATELET # BLD AUTO: 260 K/UL (ref 150–450)
PMV BLD AUTO: 12.4 FL (ref 9.4–12.3)
POTASSIUM SERPL-SCNC: 4.9 MMOL/L (ref 3.5–5.1)
PROT SERPL-MCNC: 6.7 G/DL (ref 6.3–8.2)
RBC # BLD AUTO: 3.94 M/UL (ref 4.05–5.2)
SODIUM SERPL-SCNC: 139 MMOL/L (ref 136–146)
T4 FREE SERPL-MCNC: 0.8 NG/DL (ref 0.78–1.46)
TSH, 3RD GENERATION: 19.5 UIU/ML (ref 0.36–3.74)
WBC # BLD AUTO: 7.7 K/UL (ref 4.3–11.1)

## 2024-01-16 PROCEDURE — G8419 CALC BMI OUT NRM PARAM NOF/U: HCPCS | Performed by: FAMILY MEDICINE

## 2024-01-16 PROCEDURE — 1123F ACP DISCUSS/DSCN MKR DOCD: CPT | Performed by: FAMILY MEDICINE

## 2024-01-16 PROCEDURE — G8427 DOCREV CUR MEDS BY ELIG CLIN: HCPCS | Performed by: FAMILY MEDICINE

## 2024-01-16 PROCEDURE — 99214 OFFICE O/P EST MOD 30 MIN: CPT | Performed by: FAMILY MEDICINE

## 2024-01-16 PROCEDURE — 1090F PRES/ABSN URINE INCON ASSESS: CPT | Performed by: FAMILY MEDICINE

## 2024-01-16 PROCEDURE — G8484 FLU IMMUNIZE NO ADMIN: HCPCS | Performed by: FAMILY MEDICINE

## 2024-01-16 PROCEDURE — G8399 PT W/DXA RESULTS DOCUMENT: HCPCS | Performed by: FAMILY MEDICINE

## 2024-01-16 PROCEDURE — 1036F TOBACCO NON-USER: CPT | Performed by: FAMILY MEDICINE

## 2024-01-16 RX ORDER — HYDROXYZINE HYDROCHLORIDE 25 MG/1
25 TABLET, FILM COATED ORAL EVERY 8 HOURS PRN
Qty: 90 TABLET | Refills: 11 | Status: SHIPPED | OUTPATIENT
Start: 2024-01-16

## 2024-01-16 RX ORDER — HYOSCYAMINE SULFATE 0.12 MG/1
0.12 TABLET SUBLINGUAL EVERY 4 HOURS PRN
Qty: 120 EACH | Refills: 1 | Status: SHIPPED | OUTPATIENT
Start: 2024-01-16

## 2024-01-16 ASSESSMENT — ENCOUNTER SYMPTOMS
ABDOMINAL PAIN: 0
DIARRHEA: 0

## 2024-01-16 NOTE — PROGRESS NOTES
Subjective:      Patient ID: Elisabet Santiago is a 83 y.o. female.    HPI  Patient comes in today after she fell landing on her back and hitting her head on December 30 when she tripped.  She states she has been trying to be more careful and has not had any more falls since then.  Patient did have a skin tear on her left upper arm but is keeping it covered and seems to be healing well.  CT of the lumbar spine did not show any acute fracture but there was a question about some inflammation around the colon possible colitis and they did put her on Cipro which she has finished.  Patient denies any abdominal pain or diarrhea.  She otherwise is feeling better and again trying to be careful.  Patient had some weight loss but she started gaining a little bit of weight back and overall her weight has been in the 90s.    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 Simms at [facility] cardio     Parkinson's disease dx 2009    followed by [facility] ; on med for control     Psychiatric disorder     anxiety: PRN meds    PUD (peptic ulcer disease)     SVT (supraventricular tachycardia)     Syncope and collapse     last episode 2 months ago (noted 1/20/21)    Unspecified adverse effect of anesthesia     delayed awakening---     Weight loss        Allergies   Allergen Reactions    Aspirin Other (See Comments)     Painful stomach-- Hx of PUD    Levothyroxine Hives    Morphine Other (See Comments)     Hallucinations       Current Outpatient Medications   Medication Sig

## 2024-01-18 ENCOUNTER — TELEPHONE (OUTPATIENT)
Dept: FAMILY MEDICINE CLINIC | Facility: CLINIC | Age: 84
End: 2024-01-18

## 2024-01-18 NOTE — TELEPHONE ENCOUNTER
Since the T4 level is still normal lets keep with the same dosage of Synthroid and we will recheck at a follow-up in a couple months

## 2024-01-18 NOTE — TELEPHONE ENCOUNTER
Pt  informed of labs. He states that he is assuming she is taking the synthroid everyday, but really unsure.

## 2024-01-18 NOTE — TELEPHONE ENCOUNTER
----- Message from Kelvin Durant MD sent at 1/18/2024  8:41 AM EST -----  The blood sugar is 84, kidney function is better sodium and potassium levels are normal.  Hemoglobin is stable at 11.3.  TSH is still elevated send make sure she is taking her thyroid medication on a consistent basis and let me know what she says.

## 2024-01-18 NOTE — RESULT ENCOUNTER NOTE
The blood sugar is 84, kidney function is better sodium and potassium levels are normal.  Hemoglobin is stable at 11.3.  TSH is still elevated send make sure she is taking her thyroid medication on a consistent basis and let me know what she says.

## 2024-02-28 RX ORDER — SUCRALFATE 1 G/1
TABLET ORAL
Qty: 360 TABLET | Refills: 3 | Status: SHIPPED | OUTPATIENT
Start: 2024-02-28

## 2024-03-21 ENCOUNTER — OFFICE VISIT (OUTPATIENT)
Dept: FAMILY MEDICINE CLINIC | Facility: CLINIC | Age: 84
End: 2024-03-21

## 2024-03-21 VITALS
TEMPERATURE: 98.1 F | OXYGEN SATURATION: 99 % | BODY MASS INDEX: 20.39 KG/M2 | HEART RATE: 82 BPM | HEIGHT: 61 IN | SYSTOLIC BLOOD PRESSURE: 136 MMHG | RESPIRATION RATE: 16 BRPM | WEIGHT: 108 LBS | DIASTOLIC BLOOD PRESSURE: 54 MMHG

## 2024-03-21 DIAGNOSIS — E55.9 VITAMIN D DEFICIENCY, UNSPECIFIED: ICD-10-CM

## 2024-03-21 DIAGNOSIS — E03.9 ACQUIRED HYPOTHYROIDISM: ICD-10-CM

## 2024-03-21 DIAGNOSIS — K21.9 GASTROESOPHAGEAL REFLUX DISEASE, UNSPECIFIED WHETHER ESOPHAGITIS PRESENT: ICD-10-CM

## 2024-03-21 DIAGNOSIS — R41.3 MEMORY IMPAIRMENT: ICD-10-CM

## 2024-03-21 DIAGNOSIS — G20.A1 PARKINSON'S DISEASE, UNSPECIFIED WHETHER DYSKINESIA PRESENT, UNSPECIFIED WHETHER MANIFESTATIONS FLUCTUATE: Primary | ICD-10-CM

## 2024-03-21 DIAGNOSIS — C17.0 MALIGNANT NEOPLASM OF DUODENUM (HCC): ICD-10-CM

## 2024-03-21 LAB
ANION GAP SERPL CALC-SCNC: 7 MMOL/L (ref 2–11)
BUN SERPL-MCNC: 12 MG/DL (ref 8–23)
CALCIUM SERPL-MCNC: 9.6 MG/DL (ref 8.3–10.4)
CHLORIDE SERPL-SCNC: 108 MMOL/L (ref 103–113)
CO2 SERPL-SCNC: 28 MMOL/L (ref 21–32)
CREAT SERPL-MCNC: 1.2 MG/DL (ref 0.6–1)
GLUCOSE SERPL-MCNC: 76 MG/DL (ref 65–100)
POTASSIUM SERPL-SCNC: 3.8 MMOL/L (ref 3.5–5.1)
SODIUM SERPL-SCNC: 143 MMOL/L (ref 136–146)
T4 FREE SERPL-MCNC: 0.8 NG/DL (ref 0.78–1.46)
TSH, 3RD GENERATION: 11.2 UIU/ML (ref 0.36–3.74)

## 2024-03-21 RX ORDER — FAMOTIDINE 40 MG/1
40 TABLET, FILM COATED ORAL 2 TIMES DAILY
Qty: 30 TABLET | Refills: 5 | Status: SHIPPED | OUTPATIENT
Start: 2024-03-21

## 2024-03-21 RX ORDER — MEMANTINE HYDROCHLORIDE 5 MG/1
5 TABLET ORAL 2 TIMES DAILY
Qty: 180 TABLET | Refills: 1 | Status: SHIPPED | OUTPATIENT
Start: 2024-03-21

## 2024-03-21 NOTE — PROGRESS NOTES
Subjective:      Patient ID: Elisabet Santiago is a 83 y.o. female.    HPI  Here for follow up on thyroid, blood pressure and history of Parkinson's.  Her TSH has been elevated but the patient does not believe that she has been taking any thyroid medication recently.  She also has a history of Parkinson's disease but apparently her neurologist left the group and she does not have a follow-up with one of them since then.  She is more concerned because she seems to be getting more forgetful.  Past Medical History:   Diagnosis Date    Arthritis     oa-  knees and elbows    Asthma     proair and neb     Cancer (HCC) dx---9/2016    bile duct --pancreatic----whipple-    Cancer ([facility])     skin- basal cell- removed     Chronic pain     GERD (gastroesophageal reflux disease)     controlled with meds    Hypotension     \"off and on\"    IBS (irritable bowel syndrome)     Insomnia     Macular degeneration     Mass of ampulla of Vater     Menopause     Orthostatic hypotension     followed by Dr. Simms at [facility]     Parkinson's disease dx 2009    followed by [facility] ; on med for control     Psychiatric disorder     anxiety: PRN meds    PUD (peptic ulcer disease)     SVT (supraventricular tachycardia)     Syncope and collapse     last episode 2 months ago (noted 1/20/21)    Unspecified adverse effect of anesthesia     delayed awakening---     Weight loss        Allergies   Allergen Reactions    Aspirin Other (See Comments)     Painful stomach-- Hx of PUD    Levothyroxine Hives    Morphine Other (See Comments)     Hallucinations       Current Outpatient Medications   Medication Sig Dispense Refill    sucralfate (CARAFATE) 1 GM tablet TAKE 1 TABLET FOUR TIMES A DAY AS NEEDED FOR PAIN AND GERD 360 tablet 3    Hyoscyamine Sulfate SL 0.125 MG SUBL Place 0.125 mg under the tongue every 4 hours as needed (abdominal cramping) 120 each 1    hydrOXYzine HCl (ATARAX) 25 MG tablet Take 1 tablet by mouth every 8 hours

## 2024-03-22 ENCOUNTER — TELEPHONE (OUTPATIENT)
Dept: FAMILY MEDICINE CLINIC | Facility: CLINIC | Age: 84
End: 2024-03-22

## 2024-03-22 DIAGNOSIS — E03.9 ACQUIRED HYPOTHYROIDISM: Primary | ICD-10-CM

## 2024-03-22 RX ORDER — LEVOTHYROXINE SODIUM 0.03 MG/1
25 TABLET ORAL DAILY
Qty: 30 TABLET | Refills: 5 | Status: CANCELLED | OUTPATIENT
Start: 2024-03-22

## 2024-03-22 RX ORDER — LEVOTHYROXINE SODIUM 0.03 MG/1
25 TABLET ORAL DAILY
Qty: 90 TABLET | Refills: 1 | Status: SHIPPED | OUTPATIENT
Start: 2024-03-22

## 2024-03-22 NOTE — TELEPHONE ENCOUNTER
----- Message from Kelvin Durant MD sent at 3/22/2024  8:13 AM EDT -----  The patient's thyroid level does look underactive and I do not believe she is taking the Synthroid as best I can tell from her last visit.  Would suggest going on a low-dose of Synthroid 25 mcg daily, let me know if she is agreeable.  Potassium is good at 3.8, blood sugars 76 and kidney function is stable.

## 2024-03-22 NOTE — TELEPHONE ENCOUNTER
Sent in prescription for:     Requested Prescriptions     Signed Prescriptions Disp Refills    levothyroxine (SYNTHROID) 25 MCG tablet 90 tablet 1     Sig: Take 1 tablet by mouth daily     Authorizing Provider: SERGEY CAIN

## 2024-03-22 NOTE — RESULT ENCOUNTER NOTE
The patient's thyroid level does look underactive and I do not believe she is taking the Synthroid as best I can tell from her last visit.  Would suggest going on a low-dose of Synthroid 25 mcg daily, let me know if she is agreeable.  Potassium is good at 3.8, blood sugars 76 and kidney function is stable.

## 2024-04-19 ENCOUNTER — OFFICE VISIT (OUTPATIENT)
Dept: NEUROLOGY | Age: 84
End: 2024-04-19

## 2024-04-19 VITALS
OXYGEN SATURATION: 99 % | HEART RATE: 84 BPM | WEIGHT: 103 LBS | DIASTOLIC BLOOD PRESSURE: 69 MMHG | BODY MASS INDEX: 19.46 KG/M2 | SYSTOLIC BLOOD PRESSURE: 134 MMHG

## 2024-04-19 DIAGNOSIS — G20.A2 PARKINSON'S DISEASE WITHOUT DYSKINESIA, WITH FLUCTUATING MANIFESTATIONS (HCC): Primary | ICD-10-CM

## 2024-04-19 DIAGNOSIS — G20.A1 COGNITIVE DEFICIT DUE TO PARKINSON'S DISEASE (HCC): ICD-10-CM

## 2024-04-19 DIAGNOSIS — R25.1 TREMOR: ICD-10-CM

## 2024-04-19 DIAGNOSIS — G90.3 NEUROLOGIC ORTHOSTATIC HYPOTENSION (HCC): ICD-10-CM

## 2024-04-19 DIAGNOSIS — M25.552 PAIN OF LEFT HIP: ICD-10-CM

## 2024-04-19 DIAGNOSIS — M48.061 SPINAL STENOSIS AT L4-L5 LEVEL: ICD-10-CM

## 2024-04-19 RX ORDER — CARBIDOPA/LEVODOPA 25MG-250MG
1 TABLET ORAL 4 TIMES DAILY
Qty: 360 TABLET | Refills: 3 | Status: SHIPPED | OUTPATIENT
Start: 2024-04-19

## 2024-04-19 RX ORDER — CARBIDOPA AND LEVODOPA 50; 200 MG/1; MG/1
2 TABLET, EXTENDED RELEASE ORAL NIGHTLY
Qty: 180 TABLET | Refills: 3 | Status: SHIPPED | OUTPATIENT
Start: 2024-04-19

## 2024-04-19 ASSESSMENT — ENCOUNTER SYMPTOMS
BACK PAIN: 1
CONSTIPATION: 0

## 2024-05-05 RX ORDER — MEMANTINE HYDROCHLORIDE 10 MG/1
10 TABLET ORAL 2 TIMES DAILY
Qty: 180 TABLET | Refills: 1 | Status: SHIPPED | OUTPATIENT
Start: 2024-05-05

## 2024-05-07 DIAGNOSIS — R41.3 MEMORY IMPAIRMENT: ICD-10-CM

## 2024-05-07 RX ORDER — MEMANTINE HYDROCHLORIDE 5 MG/1
5 TABLET ORAL 2 TIMES DAILY
Qty: 180 TABLET | Refills: 3 | Status: SHIPPED | OUTPATIENT
Start: 2024-05-07

## 2024-05-07 RX ORDER — POTASSIUM CHLORIDE 750 MG/1
TABLET, EXTENDED RELEASE ORAL
Qty: 180 TABLET | Refills: 3 | OUTPATIENT
Start: 2024-05-07

## 2024-05-08 ENCOUNTER — OFFICE VISIT (OUTPATIENT)
Age: 84
End: 2024-05-08
Payer: MEDICARE

## 2024-05-08 VITALS — WEIGHT: 106.8 LBS | BODY MASS INDEX: 20.16 KG/M2 | HEIGHT: 61 IN

## 2024-05-08 DIAGNOSIS — M48.062 SPINAL STENOSIS OF LUMBAR REGION WITH NEUROGENIC CLAUDICATION: Primary | ICD-10-CM

## 2024-05-08 DIAGNOSIS — M48.062 SPINAL STENOSIS, LUMBAR REGION WITH NEUROGENIC CLAUDICATION: ICD-10-CM

## 2024-05-08 DIAGNOSIS — M54.16 LUMBAR RADICULOPATHY: ICD-10-CM

## 2024-05-08 PROCEDURE — 99204 OFFICE O/P NEW MOD 45 MIN: CPT | Performed by: NURSE PRACTITIONER

## 2024-05-08 PROCEDURE — 1090F PRES/ABSN URINE INCON ASSESS: CPT | Performed by: NURSE PRACTITIONER

## 2024-05-08 PROCEDURE — 1123F ACP DISCUSS/DSCN MKR DOCD: CPT | Performed by: NURSE PRACTITIONER

## 2024-05-08 PROCEDURE — G8428 CUR MEDS NOT DOCUMENT: HCPCS | Performed by: NURSE PRACTITIONER

## 2024-05-08 PROCEDURE — G8420 CALC BMI NORM PARAMETERS: HCPCS | Performed by: NURSE PRACTITIONER

## 2024-05-08 PROCEDURE — 1036F TOBACCO NON-USER: CPT | Performed by: NURSE PRACTITIONER

## 2024-05-08 RX ORDER — METHYLPREDNISOLONE 4 MG/1
TABLET ORAL
Qty: 1 KIT | Refills: 0 | Status: SHIPPED | OUTPATIENT
Start: 2024-05-08

## 2024-05-08 NOTE — PROGRESS NOTES
Name: Elisabet Santiago  YOB: 1940  Gender: female  MRN: [id number]    CC: Low back pain, left leg pain    HPI: This is a 84 y.o. year old female who has a history of Parkinson's who was referred to us for further evaluation of lumbar radiculopathy related to L4-5 stenosis noted on a CT of the lumbar spine.  She has complaints of pain rating down the lateral left leg.  She is on Eliquis.  She does take Tylenol.  Pain is described as shooting.  She denies bladder or bowel changes.      The patient denies any change in bowel or bladder function since the onset of the symptoms. she  has not had lumbar surgery in the past.      Thus far, the patient has tried tylenol and opiod pain medicine    Current pain level:   Activities limited by pain:             No data to display                     ROS/Meds/PSH/PMH/FH/SH: I personally reviewed the patient's collected intake data.  Below are the pertinents:    Allergies   Allergen Reactions    Aspirin Other (See Comments)     Painful stomach-- Hx of PUD    Levothyroxine Hives    Morphine Other (See Comments)     Hallucinations         Current Outpatient Medications:     methylPREDNISolone (MEDROL DOSEPACK) 4 MG tablet, Take by mouth as directed. Start in morning, Disp: 1 kit, Rfl: 0    memantine (NAMENDA) 5 MG tablet, Take 1 tablet by mouth 2 times daily, Disp: 180 tablet, Rfl: 3    carbidopa-levodopa (SINEMET)  MG per tablet, Take 1 tablet by mouth 4 times daily, Disp: 360 tablet, Rfl: 3    carbidopa-levodopa (SINEMET CR)  MG per extended release tablet, Take 2 tablets by mouth nightly, Disp: 180 tablet, Rfl: 3    levothyroxine (SYNTHROID) 25 MCG tablet, Take 1 tablet by mouth daily, Disp: 90 tablet, Rfl: 1    famotidine (PEPCID) 40 MG tablet, Take 1 tablet by mouth 2 times daily, Disp: 30 tablet, Rfl: 5    sucralfate (CARAFATE) 1 GM tablet, TAKE 1 TABLET FOUR TIMES A DAY AS NEEDED FOR PAIN AND GERD, Disp: 360 tablet, Rfl: 3    estrogens

## 2024-05-13 SDOH — ECONOMIC STABILITY: TRANSPORTATION INSECURITY
IN THE PAST 12 MONTHS, HAS LACK OF TRANSPORTATION KEPT YOU FROM MEETINGS, WORK, OR FROM GETTING THINGS NEEDED FOR DAILY LIVING?: NO

## 2024-05-13 SDOH — ECONOMIC STABILITY: INCOME INSECURITY: HOW HARD IS IT FOR YOU TO PAY FOR THE VERY BASICS LIKE FOOD, HOUSING, MEDICAL CARE, AND HEATING?: NOT HARD AT ALL

## 2024-05-13 SDOH — ECONOMIC STABILITY: FOOD INSECURITY: WITHIN THE PAST 12 MONTHS, THE FOOD YOU BOUGHT JUST DIDN'T LAST AND YOU DIDN'T HAVE MONEY TO GET MORE.: NEVER TRUE

## 2024-05-13 SDOH — ECONOMIC STABILITY: FOOD INSECURITY: WITHIN THE PAST 12 MONTHS, YOU WORRIED THAT YOUR FOOD WOULD RUN OUT BEFORE YOU GOT MONEY TO BUY MORE.: NEVER TRUE

## 2024-05-14 ENCOUNTER — OFFICE VISIT (OUTPATIENT)
Dept: FAMILY MEDICINE CLINIC | Facility: CLINIC | Age: 84
End: 2024-05-14
Payer: MEDICARE

## 2024-05-14 VITALS
RESPIRATION RATE: 16 BRPM | HEIGHT: 61 IN | HEART RATE: 86 BPM | WEIGHT: 109 LBS | SYSTOLIC BLOOD PRESSURE: 161 MMHG | BODY MASS INDEX: 20.58 KG/M2 | TEMPERATURE: 97.8 F | OXYGEN SATURATION: 98 % | DIASTOLIC BLOOD PRESSURE: 64 MMHG

## 2024-05-14 DIAGNOSIS — M25.552 LEFT HIP PAIN: ICD-10-CM

## 2024-05-14 DIAGNOSIS — G20.A1 PARKINSON'S DISEASE, UNSPECIFIED WHETHER DYSKINESIA PRESENT, UNSPECIFIED WHETHER MANIFESTATIONS FLUCTUATE (HCC): ICD-10-CM

## 2024-05-14 DIAGNOSIS — E03.9 ACQUIRED HYPOTHYROIDISM: ICD-10-CM

## 2024-05-14 DIAGNOSIS — B35.1 ONYCHOMYCOSIS: Primary | ICD-10-CM

## 2024-05-14 LAB
ALBUMIN SERPL-MCNC: 4 G/DL (ref 3.2–4.6)
ALBUMIN/GLOB SERPL: 1.3 (ref 1–1.9)
ALP SERPL-CCNC: 155 U/L (ref 35–104)
ALT SERPL-CCNC: 7 U/L (ref 12–65)
ANION GAP SERPL CALC-SCNC: 11 MMOL/L (ref 9–18)
AST SERPL-CCNC: 25 U/L (ref 15–37)
BASOPHILS # BLD: 0.1 K/UL (ref 0–0.2)
BASOPHILS NFR BLD: 1 % (ref 0–2)
BILIRUB SERPL-MCNC: 0.2 MG/DL (ref 0–1.2)
BUN SERPL-MCNC: 19 MG/DL (ref 8–23)
CALCIUM SERPL-MCNC: 9 MG/DL (ref 8.8–10.2)
CHLORIDE SERPL-SCNC: 106 MMOL/L (ref 98–107)
CO2 SERPL-SCNC: 26 MMOL/L (ref 20–28)
CREAT SERPL-MCNC: 1.04 MG/DL (ref 0.6–1.1)
DIFFERENTIAL METHOD BLD: ABNORMAL
EOSINOPHIL # BLD: 0.1 K/UL (ref 0–0.8)
EOSINOPHIL NFR BLD: 1 % (ref 0.5–7.8)
ERYTHROCYTE [DISTWIDTH] IN BLOOD BY AUTOMATED COUNT: 14.9 % (ref 11.9–14.6)
GLOBULIN SER CALC-MCNC: 3.2 G/DL (ref 2.3–3.5)
GLUCOSE SERPL-MCNC: 69 MG/DL (ref 70–99)
HCT VFR BLD AUTO: 40.7 % (ref 35.8–46.3)
HGB BLD-MCNC: 12.6 G/DL (ref 11.7–15.4)
IMM GRANULOCYTES # BLD AUTO: 0.2 K/UL (ref 0–0.5)
IMM GRANULOCYTES NFR BLD AUTO: 2 % (ref 0–5)
LYMPHOCYTES # BLD: 2.3 K/UL (ref 0.5–4.6)
LYMPHOCYTES NFR BLD: 21 % (ref 13–44)
MCH RBC QN AUTO: 28.8 PG (ref 26.1–32.9)
MCHC RBC AUTO-ENTMCNC: 31 G/DL (ref 31.4–35)
MCV RBC AUTO: 93.1 FL (ref 82–102)
MONOCYTES # BLD: 0.9 K/UL (ref 0.1–1.3)
MONOCYTES NFR BLD: 8 % (ref 4–12)
NEUTS SEG # BLD: 7.4 K/UL (ref 1.7–8.2)
NEUTS SEG NFR BLD: 67 % (ref 43–78)
NRBC # BLD: 0 K/UL (ref 0–0.2)
PLATELET # BLD AUTO: 276 K/UL (ref 150–450)
PMV BLD AUTO: 12.4 FL (ref 9.4–12.3)
POTASSIUM SERPL-SCNC: 4.3 MMOL/L (ref 3.5–5.1)
PROT SERPL-MCNC: 7.2 G/DL (ref 6.3–8.2)
RBC # BLD AUTO: 4.37 M/UL (ref 4.05–5.2)
SODIUM SERPL-SCNC: 143 MMOL/L (ref 136–145)
T4 FREE SERPL-MCNC: 1 NG/DL (ref 0.9–1.7)
TSH, 3RD GENERATION: 11.2 UIU/ML (ref 0.27–4.2)
WBC # BLD AUTO: 10.9 K/UL (ref 4.3–11.1)

## 2024-05-14 PROCEDURE — 99214 OFFICE O/P EST MOD 30 MIN: CPT | Performed by: FAMILY MEDICINE

## 2024-05-14 PROCEDURE — 1090F PRES/ABSN URINE INCON ASSESS: CPT | Performed by: FAMILY MEDICINE

## 2024-05-14 PROCEDURE — G8427 DOCREV CUR MEDS BY ELIG CLIN: HCPCS | Performed by: FAMILY MEDICINE

## 2024-05-14 PROCEDURE — 1123F ACP DISCUSS/DSCN MKR DOCD: CPT | Performed by: FAMILY MEDICINE

## 2024-05-14 PROCEDURE — G8420 CALC BMI NORM PARAMETERS: HCPCS | Performed by: FAMILY MEDICINE

## 2024-05-14 PROCEDURE — 1036F TOBACCO NON-USER: CPT | Performed by: FAMILY MEDICINE

## 2024-05-14 RX ORDER — TERBINAFINE HYDROCHLORIDE 250 MG/1
250 TABLET ORAL DAILY
Qty: 30 TABLET | Refills: 2 | Status: SHIPPED | OUTPATIENT
Start: 2024-05-14 | End: 2024-08-12

## 2024-05-14 RX ORDER — TRAMADOL HYDROCHLORIDE 50 MG/1
50 TABLET ORAL EVERY 8 HOURS PRN
Qty: 30 TABLET | Refills: 3 | Status: SHIPPED | OUTPATIENT
Start: 2024-05-14 | End: 2024-09-11

## 2024-05-14 NOTE — PROGRESS NOTES
HISTORY OF PRESENT ILLNESS     Elisabet Santiago is a 84 y.o. female who presents for       HPI  Patient comes in with some concerns about persistent thickening particularly of several fingernails on her right hand that are become more brittle and will break off easily.  She has tried some topical antifungal cream prescribed both by myself and dermatology.  Patient also is due for recheck on her thyroid.    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 Simms at [facility] cardio     Parkinson's disease ([facility]) dx 2009    followed by [facility] Neurology ; on med for control     Psychiatric disorder     anxiety: PRN meds    PUD (peptic ulcer disease)     SVT (supraventricular tachycardia) ([facility])     Syncope and collapse     last episode 2 months ago (noted 1/20/21)    Unspecified adverse effect of anesthesia     delayed awakening---     Weight loss        Allergies   Allergen Reactions    Aspirin Other (See Comments)     Painful stomach-- Hx of PUD    Levothyroxine Hives    Morphine Other (See Comments)     Hallucinations       Current Outpatient Medications   Medication Sig Dispense Refill    traMADol (ULTRAM) 50 MG tablet Take 1 tablet by mouth every 8 hours as needed for Pain for up to 120 days. Max Daily Amount: 150 mg 30 tablet 3    terbinafine (LAMISIL) 250 MG tablet Take 1 tablet by mouth daily 30 tablet 2    memantine (NAMENDA) 5 MG tablet Take 1 tablet by mouth 2 times daily 180 tablet 3    carbidopa-levodopa (SINEMET)  MG per tablet Take 1 tablet by mouth

## 2024-05-15 ENCOUNTER — OFFICE VISIT (OUTPATIENT)
Dept: ORTHOPEDIC SURGERY | Age: 84
End: 2024-05-15
Payer: MEDICARE

## 2024-05-15 VITALS — HEIGHT: 61 IN | BODY MASS INDEX: 20.01 KG/M2 | WEIGHT: 106 LBS

## 2024-05-15 DIAGNOSIS — M25.552 LEFT HIP PAIN: Primary | ICD-10-CM

## 2024-05-15 DIAGNOSIS — M70.62 TROCHANTERIC BURSITIS OF LEFT HIP: ICD-10-CM

## 2024-05-15 PROCEDURE — 1090F PRES/ABSN URINE INCON ASSESS: CPT | Performed by: ORTHOPAEDIC SURGERY

## 2024-05-15 PROCEDURE — G8427 DOCREV CUR MEDS BY ELIG CLIN: HCPCS | Performed by: ORTHOPAEDIC SURGERY

## 2024-05-15 PROCEDURE — 1123F ACP DISCUSS/DSCN MKR DOCD: CPT | Performed by: ORTHOPAEDIC SURGERY

## 2024-05-15 PROCEDURE — G8420 CALC BMI NORM PARAMETERS: HCPCS | Performed by: ORTHOPAEDIC SURGERY

## 2024-05-15 PROCEDURE — 1036F TOBACCO NON-USER: CPT | Performed by: ORTHOPAEDIC SURGERY

## 2024-05-15 PROCEDURE — 99203 OFFICE O/P NEW LOW 30 MIN: CPT | Performed by: ORTHOPAEDIC SURGERY

## 2024-05-15 RX ORDER — METHYLPREDNISOLONE 4 MG/1
TABLET ORAL
COMMUNITY
Start: 2024-05-08

## 2024-05-15 NOTE — PROGRESS NOTES
Patient ID:    Elisabet Santiago  [id number]  84 y.o.  1940    Today: May 15, 2024      Chief Complaint: Left hip pain        Elisabet is an 84 old female presents for evaluation of her left hip.  She has intermittent left hip pain predominantly about her hip abductors which occurs when she sits and tries to bring her leg across her body and sit crosslegged.  This is intermittent she takes Tylenol occasionally for the pain.  Is gotten better spontaneously she is active walking 4 to 6 miles a day prior to this using her cane.  She also has a diagnosis of Parkinson's and often feels weak in her hip muscles.  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 Simms at [facility]     Parkinson's disease ([facility]) dx 2009    followed by [facility] ; on med for control     Psychiatric disorder     anxiety: PRN meds    PUD (peptic ulcer disease)     SVT (supraventricular tachycardia) ([facility])     Syncope and collapse     last episode 2 months ago (noted 1/20/21)    Unspecified adverse effect of anesthesia     delayed awakening---     Weight loss        Past Surgical History:  Past Surgical History:   Procedure Laterality Date    BREAST BIOPSY Left     CHOLECYSTECTOMY, LAPAROSCOPIC  2001    COLONOSCOPY N/A 12/28/2017    COLONOSCOPY/ 19 performed by Andrade Elizondo MD at [facility] ENDOSCOPY    COLONOSCOPY  1/22/2021         COLONOSCOPY N/A 1/22/2021    COLONOSCOPY/ BMI

## 2024-05-15 NOTE — RESULT ENCOUNTER NOTE
The TSH is still elevated but her T4 level is normal so continue on the same dosage of thyroid medication at this time.  Blood sugar is 69, kidney function and liver enzymes are okay hemoglobin is good at 12.6.

## 2024-06-21 ENCOUNTER — OFFICE VISIT (OUTPATIENT)
Dept: FAMILY MEDICINE CLINIC | Facility: CLINIC | Age: 84
End: 2024-06-21

## 2024-06-21 VITALS
HEIGHT: 61 IN | BODY MASS INDEX: 21.52 KG/M2 | DIASTOLIC BLOOD PRESSURE: 54 MMHG | RESPIRATION RATE: 16 BRPM | WEIGHT: 114 LBS | OXYGEN SATURATION: 98 % | TEMPERATURE: 97.5 F | HEART RATE: 84 BPM | SYSTOLIC BLOOD PRESSURE: 109 MMHG

## 2024-06-21 DIAGNOSIS — R41.3 MEMORY IMPAIRMENT: ICD-10-CM

## 2024-06-21 DIAGNOSIS — G20.A1 PARKINSON'S DISEASE, UNSPECIFIED WHETHER DYSKINESIA PRESENT, UNSPECIFIED WHETHER MANIFESTATIONS FLUCTUATE (HCC): Primary | ICD-10-CM

## 2024-06-21 DIAGNOSIS — I48.92 ATRIAL FLUTTER, UNSPECIFIED TYPE (HCC): ICD-10-CM

## 2024-06-21 DIAGNOSIS — J45.909 ASTHMA, UNSPECIFIED ASTHMA SEVERITY, UNSPECIFIED WHETHER COMPLICATED, UNSPECIFIED WHETHER PERSISTENT: ICD-10-CM

## 2024-06-21 DIAGNOSIS — C17.0 MALIGNANT NEOPLASM OF DUODENUM (HCC): ICD-10-CM

## 2024-06-21 DIAGNOSIS — G90.3 NEUROLOGIC ORTHOSTATIC HYPOTENSION (HCC): ICD-10-CM

## 2024-06-21 DIAGNOSIS — R32 URINARY INCONTINENCE, UNSPECIFIED TYPE: ICD-10-CM

## 2024-06-21 DIAGNOSIS — R11.0 NAUSEA: ICD-10-CM

## 2024-06-21 DIAGNOSIS — M25.552 LEFT HIP PAIN: ICD-10-CM

## 2024-06-21 DIAGNOSIS — N95.2 ATROPHIC VAGINITIS: ICD-10-CM

## 2024-06-21 DIAGNOSIS — E03.9 ACQUIRED HYPOTHYROIDISM: ICD-10-CM

## 2024-06-21 RX ORDER — ALBUTEROL SULFATE 90 UG/1
2 AEROSOL, METERED RESPIRATORY (INHALATION) EVERY 6 HOURS PRN
Qty: 18 G | Refills: 5 | Status: SHIPPED | OUTPATIENT
Start: 2024-06-21

## 2024-06-21 RX ORDER — DONEPEZIL HYDROCHLORIDE 10 MG/1
10 TABLET, FILM COATED ORAL NIGHTLY
Qty: 90 TABLET | Refills: 3 | Status: SHIPPED | OUTPATIENT
Start: 2024-06-21

## 2024-06-21 RX ORDER — MEMANTINE HYDROCHLORIDE 5 MG/1
5 TABLET ORAL 2 TIMES DAILY
Qty: 180 TABLET | Refills: 3 | Status: SHIPPED | OUTPATIENT
Start: 2024-06-21

## 2024-06-21 RX ORDER — DIAZEPAM 10 MG/1
10 TABLET ORAL 2 TIMES DAILY PRN
Qty: 60 TABLET | Refills: 3 | Status: SHIPPED | OUTPATIENT
Start: 2024-06-21 | End: 2024-10-19

## 2024-06-21 RX ORDER — CONJUGATED ESTROGENS 0.62 MG/G
CREAM VAGINAL
Qty: 30 G | Refills: 1 | Status: SHIPPED | OUTPATIENT
Start: 2024-06-21

## 2024-06-21 RX ORDER — ONDANSETRON 4 MG/1
4 TABLET, ORALLY DISINTEGRATING ORAL EVERY 8 HOURS PRN
Qty: 21 TABLET | Refills: 1 | Status: SHIPPED | OUTPATIENT
Start: 2024-06-21

## 2024-06-21 RX ORDER — FLUDROCORTISONE ACETATE 0.1 MG/1
TABLET ORAL
Qty: 90 TABLET | Refills: 3 | Status: SHIPPED | OUTPATIENT
Start: 2024-06-21

## 2024-06-21 RX ORDER — MIDODRINE HYDROCHLORIDE 10 MG/1
TABLET ORAL
Qty: 270 TABLET | Refills: 3 | Status: SHIPPED | OUTPATIENT
Start: 2024-06-21

## 2024-06-21 RX ORDER — OXYBUTYNIN CHLORIDE 5 MG/1
TABLET ORAL
Qty: 180 TABLET | Refills: 3 | Status: SHIPPED | OUTPATIENT
Start: 2024-06-21

## 2024-06-21 RX ORDER — TRAMADOL HYDROCHLORIDE 50 MG/1
50 TABLET ORAL EVERY 8 HOURS PRN
Qty: 30 TABLET | Refills: 3 | Status: SHIPPED | OUTPATIENT
Start: 2024-06-21 | End: 2024-07-31

## 2024-06-21 ASSESSMENT — ENCOUNTER SYMPTOMS: BACK PAIN: 1

## 2024-06-21 NOTE — PROGRESS NOTES
HISTORY OF PRESENT ILLNESS     Elisabet Santiago is a 84 y.o. female who presents for       HPI    Patient comes in today for follow-up on her history of Parkinson's disease, dementia and medication refills.  The patient has been having some hip and back pain and seeing orthopedics about that.  Previously has used tramadol as needed for pain but occasionally uses diazepam for muscle relaxer.  No other new concerns expressed did have labs done last month.  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)     skin- basal cell- removed     Chronic pain     GERD (gastroesophageal reflux disease)     controlled with meds    Hypotension     \"off and on\"    IBS (irritable bowel syndrome)     Insomnia     Macular degeneration     Mass of ampulla of Vater     Menopause     Orthostatic hypotension     followed by Dr. Simms at [facility]     Parkinson's disease ([facility]) dx 2009    followed by [facility] ; on med for control     Psychiatric disorder     anxiety: PRN meds    PUD (peptic ulcer disease)     SVT (supraventricular tachycardia) ([facility])     Syncope and collapse     last episode 2 months ago (noted 1/20/21)    Unspecified adverse effect of anesthesia     delayed awakening---     Weight loss        Allergies   Allergen Reactions    Aspirin Other (See Comments)     Painful stomach-- Hx of PUD    Levothyroxine Hives    Morphine Other (See Comments)     Hallucinations       Current Outpatient Medications   Medication Sig Dispense Refill    oxyBUTYnin (DITROPAN) 5 MG tablet TAKE 1 TO 2 TABLETS AT BEDTIME ONLY 180 tablet 3    ondansetron (ZOFRAN-ODT) 4 MG disintegrating tablet Take 1 tablet by mouth every 8 hours as needed for Nausea 21 tablet 1    midodrine (PROAMATINE) 10 MG tablet TAKE 1 TABLET THREE TIMES A  tablet 3    fludrocortisone (FLORINEF) 0.1 MG tablet

## 2024-06-27 ENCOUNTER — TELEPHONE (OUTPATIENT)
Age: 84
End: 2024-06-27

## 2024-06-27 NOTE — TELEPHONE ENCOUNTER
Pt states she has been having worsening edema in BLE. States she does not add salt to her food, but has been eating more processed foods. Keeps feet elevated when she doesn't have to be up. Drinks about 32 oz water daily and \"a lot of tea.\" Recommend minimizing processed foods, daily weights-write down in a notebook and call for weight gain >2# overnight or 5# in a week. Call back if no improvement after decreasing processed foods for 1 week. Verb understanding.

## 2024-07-02 ENCOUNTER — APPOINTMENT (OUTPATIENT)
Dept: CT IMAGING | Age: 84
End: 2024-07-02
Payer: MEDICARE

## 2024-07-02 ENCOUNTER — APPOINTMENT (OUTPATIENT)
Dept: GENERAL RADIOLOGY | Age: 84
End: 2024-07-02
Payer: MEDICARE

## 2024-07-02 ENCOUNTER — HOSPITAL ENCOUNTER (EMERGENCY)
Age: 84
Discharge: HOME | End: 2024-07-03
Attending: STUDENT IN AN ORGANIZED HEALTH CARE EDUCATION/TRAINING PROGRAM
Payer: MEDICARE

## 2024-07-02 VITALS
HEIGHT: 61 IN | BODY MASS INDEX: 21.14 KG/M2 | WEIGHT: 112 LBS | RESPIRATION RATE: 18 BRPM | TEMPERATURE: 97.9 F | SYSTOLIC BLOOD PRESSURE: 128 MMHG | HEART RATE: 79 BPM | DIASTOLIC BLOOD PRESSURE: 71 MMHG | OXYGEN SATURATION: 97 %

## 2024-07-02 DIAGNOSIS — R00.2 PALPITATIONS: ICD-10-CM

## 2024-07-02 DIAGNOSIS — R53.83 OTHER FATIGUE: Primary | ICD-10-CM

## 2024-07-02 DIAGNOSIS — R60.0 PERIPHERAL EDEMA: ICD-10-CM

## 2024-07-02 LAB
ALBUMIN SERPL-MCNC: 4.1 G/DL (ref 3.2–4.6)
ALBUMIN/GLOB SERPL: 1.3 (ref 1–1.9)
ALP SERPL-CCNC: 152 U/L (ref 35–104)
ALT SERPL-CCNC: <5 U/L (ref 12–65)
ANION GAP SERPL CALC-SCNC: 14 MMOL/L (ref 9–18)
AST SERPL-CCNC: 29 U/L (ref 15–37)
BASOPHILS # BLD: 0.1 K/UL (ref 0–0.2)
BASOPHILS NFR BLD: 1 % (ref 0–2)
BILIRUB SERPL-MCNC: 0.4 MG/DL (ref 0–1.2)
BUN SERPL-MCNC: 8 MG/DL (ref 8–23)
CALCIUM SERPL-MCNC: 9.1 MG/DL (ref 8.8–10.2)
CHLORIDE SERPL-SCNC: 102 MMOL/L (ref 98–107)
CO2 SERPL-SCNC: 28 MMOL/L (ref 20–28)
CREAT SERPL-MCNC: 1 MG/DL (ref 0.6–1.1)
DIFFERENTIAL METHOD BLD: ABNORMAL
EOSINOPHIL # BLD: 0.1 K/UL (ref 0–0.8)
EOSINOPHIL NFR BLD: 2 % (ref 0.5–7.8)
ERYTHROCYTE [DISTWIDTH] IN BLOOD BY AUTOMATED COUNT: 15.4 % (ref 11.9–14.6)
GLOBULIN SER CALC-MCNC: 3.1 G/DL (ref 2.3–3.5)
GLUCOSE SERPL-MCNC: 106 MG/DL (ref 70–99)
HCT VFR BLD AUTO: 37.2 % (ref 35.8–46.3)
HGB BLD-MCNC: 12.1 G/DL (ref 11.7–15.4)
IMM GRANULOCYTES # BLD AUTO: 0 K/UL (ref 0–0.5)
IMM GRANULOCYTES NFR BLD AUTO: 0 % (ref 0–5)
LYMPHOCYTES # BLD: 1.2 K/UL (ref 0.5–4.6)
LYMPHOCYTES NFR BLD: 17 % (ref 13–44)
MAGNESIUM SERPL-MCNC: 1.6 MG/DL (ref 1.8–2.4)
MCH RBC QN AUTO: 28.9 PG (ref 26.1–32.9)
MCHC RBC AUTO-ENTMCNC: 32.5 G/DL (ref 31.4–35)
MCV RBC AUTO: 88.8 FL (ref 82–102)
MONOCYTES # BLD: 0.6 K/UL (ref 0.1–1.3)
MONOCYTES NFR BLD: 8 % (ref 4–12)
NEUTS SEG # BLD: 5.4 K/UL (ref 1.7–8.2)
NEUTS SEG NFR BLD: 72 % (ref 43–78)
NRBC # BLD: 0 K/UL (ref 0–0.2)
NT PRO BNP: 2107 PG/ML (ref 0–450)
PLATELET # BLD AUTO: 237 K/UL (ref 150–450)
PMV BLD AUTO: 12.1 FL (ref 9.4–12.3)
POTASSIUM SERPL-SCNC: 2.6 MMOL/L (ref 3.5–5.1)
PROT SERPL-MCNC: 7.2 G/DL (ref 6.3–8.2)
RBC # BLD AUTO: 4.19 M/UL (ref 4.05–5.2)
SODIUM SERPL-SCNC: 143 MMOL/L (ref 136–145)
TROPONIN T SERPL HS-MCNC: 22 NG/L (ref 0–14)
TROPONIN T SERPL HS-MCNC: 23 NG/L (ref 0–14)
TSH, 3RD GENERATION: 7.34 UIU/ML (ref 0.27–4.2)
WBC # BLD AUTO: 7.5 K/UL (ref 4.3–11.1)

## 2024-07-02 PROCEDURE — 73110 X-RAY EXAM OF WRIST: CPT

## 2024-07-02 PROCEDURE — 83880 ASSAY OF NATRIURETIC PEPTIDE: CPT

## 2024-07-02 PROCEDURE — 99285 EMERGENCY DEPT VISIT HI MDM: CPT

## 2024-07-02 PROCEDURE — 93005 ELECTROCARDIOGRAM TRACING: CPT | Performed by: STUDENT IN AN ORGANIZED HEALTH CARE EDUCATION/TRAINING PROGRAM

## 2024-07-02 PROCEDURE — 70450 CT HEAD/BRAIN W/O DYE: CPT

## 2024-07-02 PROCEDURE — 73080 X-RAY EXAM OF ELBOW: CPT

## 2024-07-02 PROCEDURE — 80053 COMPREHEN METABOLIC PANEL: CPT

## 2024-07-02 PROCEDURE — 83735 ASSAY OF MAGNESIUM: CPT

## 2024-07-02 PROCEDURE — 84484 ASSAY OF TROPONIN QUANT: CPT

## 2024-07-02 PROCEDURE — 6370000000 HC RX 637 (ALT 250 FOR IP): Performed by: STUDENT IN AN ORGANIZED HEALTH CARE EDUCATION/TRAINING PROGRAM

## 2024-07-02 PROCEDURE — 85025 COMPLETE CBC W/AUTO DIFF WBC: CPT

## 2024-07-02 PROCEDURE — 84443 ASSAY THYROID STIM HORMONE: CPT

## 2024-07-02 PROCEDURE — 6360000002 HC RX W HCPCS: Performed by: STUDENT IN AN ORGANIZED HEALTH CARE EDUCATION/TRAINING PROGRAM

## 2024-07-02 PROCEDURE — 71046 X-RAY EXAM CHEST 2 VIEWS: CPT

## 2024-07-02 RX ORDER — MAGNESIUM SULFATE IN WATER 40 MG/ML
2000 INJECTION, SOLUTION INTRAVENOUS ONCE
Status: COMPLETED | OUTPATIENT
Start: 2024-07-02 | End: 2024-07-03

## 2024-07-02 RX ORDER — FUROSEMIDE 20 MG/1
20 TABLET ORAL DAILY
Qty: 7 TABLET | Refills: 0 | Status: SHIPPED | OUTPATIENT
Start: 2024-07-02

## 2024-07-02 RX ADMIN — MAGNESIUM SULFATE HEPTAHYDRATE 2000 MG: 40 INJECTION, SOLUTION INTRAVENOUS at 22:50

## 2024-07-02 RX ADMIN — POTASSIUM BICARBONATE 40 MEQ: 782 TABLET, EFFERVESCENT ORAL at 22:50

## 2024-07-02 ASSESSMENT — PAIN - FUNCTIONAL ASSESSMENT: PAIN_FUNCTIONAL_ASSESSMENT: NONE - DENIES PAIN

## 2024-07-02 NOTE — ED TRIAGE NOTES
Pt c/o fatigue and nausea. Pt reports \"lower\" BP readings of systolic 109. Pt denies fever, nausea, vomiting.  Pt also reports falling last night, thinks its related to multiple medications she is on. Denies CP or SOB.

## 2024-07-03 LAB
EKG ATRIAL RATE: 84 BPM
EKG DIAGNOSIS: NORMAL
EKG P AXIS: 72 DEGREES
EKG P-R INTERVAL: 153 MS
EKG Q-T INTERVAL: 405 MS
EKG QRS DURATION: 91 MS
EKG QTC CALCULATION (BAZETT): 482 MS
EKG R AXIS: 33 DEGREES
EKG T AXIS: 42 DEGREES
EKG VENTRICULAR RATE: 92 BPM

## 2024-07-03 PROCEDURE — 93010 ELECTROCARDIOGRAM REPORT: CPT | Performed by: INTERNAL MEDICINE

## 2024-07-03 NOTE — ED NOTES
Wound, skin tear,  on right arm cleaned and dressed with nonadherent dressing.     Tejinder Fine RN  07/03/24 0056

## 2024-07-03 NOTE — DISCHARGE INSTRUCTIONS
Take the medication prescribed as directed.  Arrange follow-up with Dr. Simms's service as instructed.  Return to this department for worsening symptoms, concerns or questions    Your evaluation today reveals no evidence of acute heart abnormality.  However, you require close outpatient follow-up with a cardiology specialist. This appointment has been arranged and you should receive a call from this specialist within the next 48 hours to schedule your appointment.  If you do not receive this call, please call the number listed.

## 2024-07-03 NOTE — ED PROVIDER NOTES
thinners    CT HEAD WITHOUT INTRAVENOUS CONTRAST    INDICATION:  fall, on thinners : fall, on thinners    TECHNIQUE:  Axial computed tomography images of the head/brain without  intravenous contrast.  Sagittal and coronal reformatted images were  created and reviewed.  This CT exam was performed using one or more of  the following dose reduction techniques:  automated exposure control,  adjustment of the mA and/or kV according to patient size, and/or use  of iterative reconstruction technique.    COMPARISON:  No relevant prior studies available.    FINDINGS:    Brain:  No acute territorial infarction or acute intracranial  hemorrhage.  Mild to moderate chronic white matter small vessel  ischemic changes.  Limited evaluation of the brainstem.  No mass or  midline shift.    Ventricles:  No hydrocephalus.    Bones/joints:  No acute fracture.    Soft tissues:  Unremarkable.    Sinuses:  Unremarkable as visualized.  No acute sinusitis.    Mastoid air cells:  Unremarkable as visualized.  No mastoid effusion.      Impression    1. No acute territorial infarction or acute intracranial hemorrhage.  Limited evaluation of the brainstem.  MRI of the brain without  contrast can be obtained for further assessment if clinically  warranted.    2. Mild-to-moderate chronic white matter small vessel ischemic  changes.      Automatic exposure control was used as a dose lowering technique.    Radiation Dose: CTDI is 49.12 mGy. DLP is 1084.82 mGy-cm.    Report signed on 07/02/2024 (21:06 Eastern Time)  Signed by: Fransisco Holden M.D.  Reading Location: 432   CBC with Auto Differential   Result Value Ref Range    WBC 7.5 4.3 - 11.1 K/uL    RBC 4.19 4.05 - 5.2 M/uL    Hemoglobin 12.1 11.7 - 15.4 g/dL    Hematocrit 37.2 35.8 - 46.3 %    MCV 88.8 82 - 102 FL    MCH 28.9 26.1 - 32.9 PG    MCHC 32.5 31.4 - 35.0 g/dL    RDW 15.4 (H) 11.9 - 14.6 %    Platelets 237 150 - 450 K/uL    MPV 12.1 9.4 - 12.3 FL    nRBC 0.00 0.0 - 0.2 K/uL

## 2024-07-12 ENCOUNTER — OFFICE VISIT (OUTPATIENT)
Age: 84
End: 2024-07-12
Payer: MEDICARE

## 2024-07-12 ENCOUNTER — HOSPITAL ENCOUNTER (INPATIENT)
Age: 84
LOS: 1 days | Discharge: HOME | DRG: 310 | End: 2024-07-14
Attending: INTERNAL MEDICINE | Admitting: INTERNAL MEDICINE
Payer: MEDICARE

## 2024-07-12 ENCOUNTER — APPOINTMENT (OUTPATIENT)
Dept: NON INVASIVE DIAGNOSTICS | Age: 84
DRG: 310 | End: 2024-07-12
Attending: INTERNAL MEDICINE
Payer: MEDICARE

## 2024-07-12 VITALS
HEART RATE: 133 BPM | HEIGHT: 61 IN | SYSTOLIC BLOOD PRESSURE: 104 MMHG | BODY MASS INDEX: 20.58 KG/M2 | DIASTOLIC BLOOD PRESSURE: 64 MMHG | WEIGHT: 109 LBS

## 2024-07-12 DIAGNOSIS — I48.92 ATRIAL FLUTTER WITH RAPID VENTRICULAR RESPONSE (HCC): ICD-10-CM

## 2024-07-12 DIAGNOSIS — E87.6 HYPOKALEMIA: ICD-10-CM

## 2024-07-12 DIAGNOSIS — Z09 HOSPITAL DISCHARGE FOLLOW-UP: Primary | ICD-10-CM

## 2024-07-12 DIAGNOSIS — I48.91 ATRIAL FIBRILLATION, UNSPECIFIED TYPE (HCC): ICD-10-CM

## 2024-07-12 DIAGNOSIS — I48.92 ATRIAL FLUTTER, UNSPECIFIED TYPE (HCC): Primary | ICD-10-CM

## 2024-07-12 DIAGNOSIS — Z79.899 LONG TERM CURRENT USE OF ANTIARRHYTHMIC DRUG: ICD-10-CM

## 2024-07-12 PROBLEM — R60.0 LOCALIZED EDEMA: Status: ACTIVE | Noted: 2024-07-12

## 2024-07-12 LAB
ALBUMIN SERPL-MCNC: 3.3 G/DL (ref 3.2–4.6)
ALBUMIN/GLOB SERPL: 1.3 (ref 1–1.9)
ALP SERPL-CCNC: 125 U/L (ref 35–104)
ALT SERPL-CCNC: <5 U/L (ref 12–65)
ANION GAP SERPL CALC-SCNC: 12 MMOL/L (ref 9–18)
AST SERPL-CCNC: 23 U/L (ref 15–37)
BASOPHILS # BLD: 0.1 K/UL (ref 0–0.2)
BASOPHILS NFR BLD: 1 % (ref 0–2)
BILIRUB SERPL-MCNC: 0.5 MG/DL (ref 0–1.2)
BUN SERPL-MCNC: 13 MG/DL (ref 8–23)
CALCIUM SERPL-MCNC: 8.9 MG/DL (ref 8.8–10.2)
CHLORIDE SERPL-SCNC: 95 MMOL/L (ref 98–107)
CO2 SERPL-SCNC: 33 MMOL/L (ref 20–28)
CREAT SERPL-MCNC: 1.26 MG/DL (ref 0.6–1.1)
DIFFERENTIAL METHOD BLD: ABNORMAL
ECHO AO ASC DIAM: 2.8 CM
ECHO AO ASCENDING AORTA INDEX: 1.93 CM/M2
ECHO AO ROOT DIAM: 2.5 CM
ECHO AO ROOT INDEX: 1.72 CM/M2
ECHO AV AREA PEAK VELOCITY: 1.7 CM2
ECHO AV AREA VTI: 1.9 CM2
ECHO AV AREA/BSA PEAK VELOCITY: 1.2 CM2/M2
ECHO AV AREA/BSA VTI: 1.3 CM2/M2
ECHO AV MEAN GRADIENT: 2 MMHG
ECHO AV MEAN GRADIENT: 2 MMHG
ECHO AV MEAN VELOCITY: 0.7 M/S
ECHO AV PEAK GRADIENT: 3 MMHG
ECHO AV PEAK VELOCITY: 0.9 M/S
ECHO AV VELOCITY RATIO: 0.67
ECHO AV VTI: 20.8 CM
ECHO BSA: 1.45 M2
ECHO EST RA PRESSURE: 15 MMHG
ECHO IVC PROX: 2.5 CM
ECHO LA AREA 2C: 14.2 CM2
ECHO LA AREA 4C: 14.2 CM2
ECHO LA DIAMETER INDEX: 2.14 CM/M2
ECHO LA DIAMETER: 3.1 CM
ECHO LA MAJOR AXIS: 4.8 CM
ECHO LA MINOR AXIS: 5.6 CM
ECHO LA TO AORTIC ROOT RATIO: 1.24
ECHO LA VOL BP: 34 ML (ref 22–52)
ECHO LA VOL MOD A2C: 31 ML (ref 22–52)
ECHO LA VOL MOD A4C: 33 ML (ref 22–52)
ECHO LA VOL/BSA BIPLANE: 23 ML/M2 (ref 16–34)
ECHO LA VOLUME INDEX MOD A2C: 21 ML/M2 (ref 16–34)
ECHO LA VOLUME INDEX MOD A4C: 23 ML/M2 (ref 16–34)
ECHO LV E' LATERAL VELOCITY: 7 CM/S
ECHO LV E' SEPTAL VELOCITY: 8 CM/S
ECHO LV EDV A2C: 50 ML
ECHO LV EDV A4C: 57 ML
ECHO LV EDV INDEX A4C: 39 ML/M2
ECHO LV EDV NDEX A2C: 34 ML/M2
ECHO LV EJECTION FRACTION A2C: 59 %
ECHO LV EJECTION FRACTION A4C: 57 %
ECHO LV EJECTION FRACTION BIPLANE: 58 % (ref 55–100)
ECHO LV ESV A2C: 21 ML
ECHO LV ESV A4C: 25 ML
ECHO LV ESV INDEX A2C: 14 ML/M2
ECHO LV ESV INDEX A4C: 17 ML/M2
ECHO LV FRACTIONAL SHORTENING: 33 % (ref 28–44)
ECHO LV INTERNAL DIMENSION DIASTOLE INDEX: 2.48 CM/M2
ECHO LV INTERNAL DIMENSION DIASTOLIC: 3.6 CM (ref 3.9–5.3)
ECHO LV INTERNAL DIMENSION SYSTOLIC INDEX: 1.66 CM/M2
ECHO LV INTERNAL DIMENSION SYSTOLIC: 2.4 CM
ECHO LV IVSD: 0.9 CM (ref 0.6–0.9)
ECHO LV MASS 2D: 92.8 G (ref 67–162)
ECHO LV MASS INDEX 2D: 64 G/M2 (ref 43–95)
ECHO LV POSTERIOR WALL DIASTOLIC: 0.9 CM (ref 0.6–0.9)
ECHO LV RELATIVE WALL THICKNESS RATIO: 0.5
ECHO LVOT AREA: 2.5 CM2
ECHO LVOT AV VTI INDEX: 0.74
ECHO LVOT DIAM: 1.8 CM
ECHO LVOT MEAN GRADIENT: 1 MMHG
ECHO LVOT MEAN GRADIENT: 1 MMHG
ECHO LVOT PEAK GRADIENT: 1 MMHG
ECHO LVOT PEAK VELOCITY: 0.6 M/S
ECHO LVOT STROKE VOLUME INDEX: 27 ML/M2
ECHO LVOT SV: 39.2 ML
ECHO LVOT VTI: 15.4 CM
ECHO MV A VELOCITY: 0.42 M/S
ECHO MV AREA VTI: 1.7 CM2
ECHO MV E DECELERATION TIME (DT): 155 MS
ECHO MV E VELOCITY: 0.99 M/S
ECHO MV E/A RATIO: 2.36
ECHO MV E/E' LATERAL: 14.14
ECHO MV E/E' RATIO (AVERAGED): 13.26
ECHO MV E/E' SEPTAL: 12.38
ECHO MV LVOT VTI INDEX: 1.48
ECHO MV MAX VELOCITY: 1 M/S
ECHO MV MEAN GRADIENT: 1 MMHG
ECHO MV MEAN VELOCITY: 0.5 M/S
ECHO MV PEAK GRADIENT: 4 MMHG
ECHO MV VTI: 22.8 CM
ECHO PV ACCELERATION TIME (AT): 151 MS
ECHO PV MAX VELOCITY: 0.5 M/S
ECHO PV PEAK GRADIENT: 1 MMHG
ECHO RIGHT VENTRICULAR SYSTOLIC PRESSURE (RVSP): 40 MMHG
ECHO RV FREE WALL PEAK S': 10 CM/S
ECHO RV INTERNAL DIMENSION: 2.6 CM
ECHO RV TAPSE: 1.7 CM (ref 1.7–?)
ECHO TV REGURGITANT MAX VELOCITY: 2.49 M/S
ECHO TV REGURGITANT PEAK GRADIENT: 25 MMHG
EKG ATRIAL RATE: 86 BPM
EKG DIAGNOSIS: NORMAL
EKG P-R INTERVAL: 200 MS
EKG Q-T INTERVAL: 410 MS
EKG QRS DURATION: 76 MS
EKG QTC CALCULATION (BAZETT): 490 MS
EKG R AXIS: 40 DEGREES
EKG T AXIS: 33 DEGREES
EKG VENTRICULAR RATE: 86 BPM
EOSINOPHIL # BLD: 0.1 K/UL (ref 0–0.8)
EOSINOPHIL NFR BLD: 3 % (ref 0.5–7.8)
ERYTHROCYTE [DISTWIDTH] IN BLOOD BY AUTOMATED COUNT: 14.3 % (ref 11.9–14.6)
GLOBULIN SER CALC-MCNC: 2.6 G/DL (ref 2.3–3.5)
GLUCOSE SERPL-MCNC: 124 MG/DL (ref 70–99)
HCT VFR BLD AUTO: 35.3 % (ref 35.8–46.3)
HGB BLD-MCNC: 11.3 G/DL (ref 11.7–15.4)
IMM GRANULOCYTES # BLD AUTO: 0 K/UL (ref 0–0.5)
IMM GRANULOCYTES NFR BLD AUTO: 0 % (ref 0–5)
LYMPHOCYTES # BLD: 1.2 K/UL (ref 0.5–4.6)
LYMPHOCYTES NFR BLD: 22 % (ref 13–44)
MAGNESIUM SERPL-MCNC: 1.6 MG/DL (ref 1.8–2.4)
MAGNESIUM SERPL-MCNC: 2.4 MG/DL (ref 1.8–2.4)
MCH RBC QN AUTO: 28.8 PG (ref 26.1–32.9)
MCHC RBC AUTO-ENTMCNC: 32 G/DL (ref 31.4–35)
MCV RBC AUTO: 89.8 FL (ref 82–102)
MONOCYTES # BLD: 0.6 K/UL (ref 0.1–1.3)
MONOCYTES NFR BLD: 11 % (ref 4–12)
NEUTS SEG # BLD: 3.2 K/UL (ref 1.7–8.2)
NEUTS SEG NFR BLD: 63 % (ref 43–78)
NRBC # BLD: 0 K/UL (ref 0–0.2)
PLATELET # BLD AUTO: 227 K/UL (ref 150–450)
PMV BLD AUTO: 12.4 FL (ref 9.4–12.3)
POTASSIUM SERPL-SCNC: 2.5 MMOL/L (ref 3.5–5.1)
PROT SERPL-MCNC: 5.9 G/DL (ref 6.3–8.2)
RBC # BLD AUTO: 3.93 M/UL (ref 4.05–5.2)
SODIUM SERPL-SCNC: 139 MMOL/L (ref 136–145)
WBC # BLD AUTO: 5.2 K/UL (ref 4.3–11.1)

## 2024-07-12 PROCEDURE — 93000 ELECTROCARDIOGRAM COMPLETE: CPT | Performed by: INTERNAL MEDICINE

## 2024-07-12 PROCEDURE — 83735 ASSAY OF MAGNESIUM: CPT

## 2024-07-12 PROCEDURE — 2580000003 HC RX 258: Performed by: NURSE PRACTITIONER

## 2024-07-12 PROCEDURE — 96366 THER/PROPH/DIAG IV INF ADDON: CPT

## 2024-07-12 PROCEDURE — 93010 ELECTROCARDIOGRAM REPORT: CPT | Performed by: INTERNAL MEDICINE

## 2024-07-12 PROCEDURE — 2580000003 HC RX 258: Performed by: PHYSICIAN ASSISTANT

## 2024-07-12 PROCEDURE — G0379 DIRECT REFER HOSPITAL OBSERV: HCPCS

## 2024-07-12 PROCEDURE — 96368 THER/DIAG CONCURRENT INF: CPT

## 2024-07-12 PROCEDURE — G0378 HOSPITAL OBSERVATION PER HR: HCPCS

## 2024-07-12 PROCEDURE — 93005 ELECTROCARDIOGRAM TRACING: CPT | Performed by: PHYSICIAN ASSISTANT

## 2024-07-12 PROCEDURE — 96365 THER/PROPH/DIAG IV INF INIT: CPT

## 2024-07-12 PROCEDURE — 36415 COLL VENOUS BLD VENIPUNCTURE: CPT

## 2024-07-12 PROCEDURE — 93306 TTE W/DOPPLER COMPLETE: CPT

## 2024-07-12 PROCEDURE — 6360000002 HC RX W HCPCS: Performed by: PHYSICIAN ASSISTANT

## 2024-07-12 PROCEDURE — 85025 COMPLETE CBC W/AUTO DIFF WBC: CPT

## 2024-07-12 PROCEDURE — 99223 1ST HOSP IP/OBS HIGH 75: CPT | Performed by: INTERNAL MEDICINE

## 2024-07-12 PROCEDURE — 6370000000 HC RX 637 (ALT 250 FOR IP): Performed by: PHYSICIAN ASSISTANT

## 2024-07-12 PROCEDURE — 6360000002 HC RX W HCPCS: Performed by: NURSE PRACTITIONER

## 2024-07-12 PROCEDURE — 80053 COMPREHEN METABOLIC PANEL: CPT

## 2024-07-12 PROCEDURE — 93306 TTE W/DOPPLER COMPLETE: CPT | Performed by: INTERNAL MEDICINE

## 2024-07-12 RX ORDER — POLYETHYLENE GLYCOL 3350 17 G/17G
17 POWDER, FOR SOLUTION ORAL DAILY PRN
Status: DISCONTINUED | OUTPATIENT
Start: 2024-07-12 | End: 2024-07-14 | Stop reason: HOSPADM

## 2024-07-12 RX ORDER — POTASSIUM CHLORIDE 7.45 MG/ML
10 INJECTION INTRAVENOUS PRN
Status: DISCONTINUED | OUTPATIENT
Start: 2024-07-12 | End: 2024-07-13

## 2024-07-12 RX ORDER — MEMANTINE HYDROCHLORIDE 5 MG/1
5 TABLET ORAL 2 TIMES DAILY
Status: DISCONTINUED | OUTPATIENT
Start: 2024-07-12 | End: 2024-07-12

## 2024-07-12 RX ORDER — POTASSIUM CHLORIDE 20 MEQ/1
40 TABLET, EXTENDED RELEASE ORAL PRN
Status: DISCONTINUED | OUTPATIENT
Start: 2024-07-12 | End: 2024-07-13

## 2024-07-12 RX ORDER — DIAZEPAM 5 MG/1
10 TABLET ORAL 2 TIMES DAILY PRN
Status: DISCONTINUED | OUTPATIENT
Start: 2024-07-12 | End: 2024-07-14 | Stop reason: HOSPADM

## 2024-07-12 RX ORDER — CARBIDOPA/LEVODOPA 25MG-250MG
1 TABLET ORAL 4 TIMES DAILY
Status: DISCONTINUED | OUTPATIENT
Start: 2024-07-12 | End: 2024-07-14 | Stop reason: HOSPADM

## 2024-07-12 RX ORDER — SODIUM CHLORIDE 0.9 % (FLUSH) 0.9 %
5-40 SYRINGE (ML) INJECTION PRN
Status: DISCONTINUED | OUTPATIENT
Start: 2024-07-12 | End: 2024-07-14 | Stop reason: HOSPADM

## 2024-07-12 RX ORDER — ACETAMINOPHEN 325 MG/1
650 TABLET ORAL EVERY 6 HOURS PRN
Status: DISCONTINUED | OUTPATIENT
Start: 2024-07-12 | End: 2024-07-14 | Stop reason: HOSPADM

## 2024-07-12 RX ORDER — LEVOTHYROXINE SODIUM 0.05 MG/1
25 TABLET ORAL DAILY
Status: DISCONTINUED | OUTPATIENT
Start: 2024-07-13 | End: 2024-07-14 | Stop reason: HOSPADM

## 2024-07-12 RX ORDER — MAGNESIUM SULFATE IN WATER 40 MG/ML
2000 INJECTION, SOLUTION INTRAVENOUS PRN
Status: DISCONTINUED | OUTPATIENT
Start: 2024-07-12 | End: 2024-07-14 | Stop reason: HOSPADM

## 2024-07-12 RX ORDER — SUCRALFATE 1 G/1
1 TABLET ORAL
Status: DISCONTINUED | OUTPATIENT
Start: 2024-07-12 | End: 2024-07-14 | Stop reason: HOSPADM

## 2024-07-12 RX ORDER — MIDODRINE HYDROCHLORIDE 5 MG/1
15 TABLET ORAL
Status: DISCONTINUED | OUTPATIENT
Start: 2024-07-12 | End: 2024-07-14 | Stop reason: HOSPADM

## 2024-07-12 RX ORDER — SODIUM CHLORIDE 0.9 % (FLUSH) 0.9 %
5-40 SYRINGE (ML) INJECTION EVERY 12 HOURS SCHEDULED
Status: DISCONTINUED | OUTPATIENT
Start: 2024-07-12 | End: 2024-07-14 | Stop reason: HOSPADM

## 2024-07-12 RX ORDER — ACETAMINOPHEN 650 MG/1
650 SUPPOSITORY RECTAL EVERY 6 HOURS PRN
Status: DISCONTINUED | OUTPATIENT
Start: 2024-07-12 | End: 2024-07-14 | Stop reason: HOSPADM

## 2024-07-12 RX ORDER — CARBIDOPA AND LEVODOPA 50; 200 MG/1; MG/1
2 TABLET, EXTENDED RELEASE ORAL NIGHTLY
Status: DISCONTINUED | OUTPATIENT
Start: 2024-07-12 | End: 2024-07-12

## 2024-07-12 RX ORDER — SODIUM CHLORIDE 9 MG/ML
INJECTION, SOLUTION INTRAVENOUS PRN
Status: DISCONTINUED | OUTPATIENT
Start: 2024-07-12 | End: 2024-07-14 | Stop reason: HOSPADM

## 2024-07-12 RX ORDER — TERBINAFINE HYDROCHLORIDE 250 MG/1
250 TABLET ORAL DAILY
Status: DISCONTINUED | OUTPATIENT
Start: 2024-07-12 | End: 2024-07-12

## 2024-07-12 RX ORDER — DONEPEZIL HYDROCHLORIDE 5 MG/1
10 TABLET, FILM COATED ORAL NIGHTLY
Status: DISCONTINUED | OUTPATIENT
Start: 2024-07-12 | End: 2024-07-14 | Stop reason: HOSPADM

## 2024-07-12 RX ORDER — OXYBUTYNIN CHLORIDE 5 MG/1
5 TABLET ORAL
Status: DISCONTINUED | OUTPATIENT
Start: 2024-07-12 | End: 2024-07-14 | Stop reason: HOSPADM

## 2024-07-12 RX ORDER — FAMOTIDINE 20 MG/1
20 TABLET, FILM COATED ORAL DAILY
Status: DISCONTINUED | OUTPATIENT
Start: 2024-07-13 | End: 2024-07-14 | Stop reason: HOSPADM

## 2024-07-12 RX ADMIN — APIXABAN 2.5 MG: 2.5 TABLET, FILM COATED ORAL at 20:30

## 2024-07-12 RX ADMIN — POTASSIUM CHLORIDE 10 MEQ: 7.46 INJECTION, SOLUTION INTRAVENOUS at 14:11

## 2024-07-12 RX ADMIN — MIDODRINE HYDROCHLORIDE 15 MG: 5 TABLET ORAL at 17:37

## 2024-07-12 RX ADMIN — SUCRALFATE 1 G: 1 TABLET ORAL at 14:12

## 2024-07-12 RX ADMIN — SUCRALFATE 1 G: 1 TABLET ORAL at 19:49

## 2024-07-12 RX ADMIN — SUCRALFATE 1 G: 1 TABLET ORAL at 17:37

## 2024-07-12 RX ADMIN — CARBIDOPA AND LEVODOPA 1 TABLET: 25; 250 TABLET ORAL at 17:37

## 2024-07-12 RX ADMIN — AMIODARONE HYDROCHLORIDE 0.5 MG/MIN: 50 INJECTION, SOLUTION INTRAVENOUS at 21:30

## 2024-07-12 RX ADMIN — CARBIDOPA AND LEVODOPA 1 TABLET: 25; 250 TABLET ORAL at 20:30

## 2024-07-12 RX ADMIN — AMIODARONE HYDROCHLORIDE 1 MG/MIN: 50 INJECTION, SOLUTION INTRAVENOUS at 13:44

## 2024-07-12 RX ADMIN — POTASSIUM CHLORIDE 10 MEQ: 7.46 INJECTION, SOLUTION INTRAVENOUS at 19:52

## 2024-07-12 RX ADMIN — MIDODRINE HYDROCHLORIDE 15 MG: 5 TABLET ORAL at 14:12

## 2024-07-12 RX ADMIN — SODIUM CHLORIDE, PRESERVATIVE FREE 5 ML: 5 INJECTION INTRAVENOUS at 19:50

## 2024-07-12 RX ADMIN — OXYBUTYNIN CHLORIDE 5 MG: 5 TABLET ORAL at 19:59

## 2024-07-12 RX ADMIN — MAGNESIUM SULFATE HEPTAHYDRATE 2000 MG: 40 INJECTION, SOLUTION INTRAVENOUS at 14:11

## 2024-07-12 RX ADMIN — POTASSIUM CHLORIDE 10 MEQ: 7.46 INJECTION, SOLUTION INTRAVENOUS at 18:07

## 2024-07-12 RX ADMIN — POTASSIUM CHLORIDE 10 MEQ: 7.46 INJECTION, SOLUTION INTRAVENOUS at 15:11

## 2024-07-12 RX ADMIN — DONEPEZIL HYDROCHLORIDE 10 MG: 5 TABLET, FILM COATED ORAL at 19:49

## 2024-07-12 RX ADMIN — POTASSIUM CHLORIDE 10 MEQ: 7.46 INJECTION, SOLUTION INTRAVENOUS at 16:37

## 2024-07-12 ASSESSMENT — PAIN SCALES - GENERAL: PAINLEVEL_OUTOF10: 0

## 2024-07-12 ASSESSMENT — ENCOUNTER SYMPTOMS
HEMOPTYSIS: 0
BLOATING: 0
BACK PAIN: 0
ABDOMINAL PAIN: 0
COUGH: 0
SHORTNESS OF BREATH: 0
VOMITING: 0
ORTHOPNEA: 0
DOUBLE VISION: 0
NAUSEA: 0
BLURRED VISION: 0

## 2024-07-12 NOTE — PROGRESS NOTES
8 8 - 23 MG/DL    Creatinine 1.00 0.60 - 1.10 MG/DL    Est, Glom Filt Rate 55 (L) >60 ml/min/1.73m2    Calcium 9.1 8.8 - 10.2 MG/DL    Total Bilirubin 0.4 0.0 - 1.2 MG/DL    ALT <5 (L) 12 - 65 U/L    AST 29 15 - 37 U/L    Alk Phosphatase 152 (H) 35 - 104 U/L    Total Protein 7.2 6.3 - 8.2 g/dL    Albumin 4.1 3.2 - 4.6 g/dL    Globulin 3.1 2.3 - 3.5 g/dL    Albumin/Globulin Ratio 1.3 1.0 - 1.9     Brain Natriuretic Peptide    Collection Time: 07/02/24  9:21 PM   Result Value Ref Range    NT Pro-BNP 2,107 (H) 0 - 450 PG/ML   TSH    Collection Time: 07/02/24  9:21 PM   Result Value Ref Range    TSH, 3rd Generation 7.340 (H) 0.270 - 4.200 uIU/mL   Troponin    Collection Time: 07/02/24 10:44 PM   Result Value Ref Range    Troponin T 22.0 (H) 0 - 14 ng/L     Lab Results   Component Value Date/Time    CHOL 187 03/15/2021 08:50 AM    HDL 65 03/15/2021 08:50 AM     03/15/2021 08:50 AM    VLDL 17 03/15/2021 08:50 AM       No results found for any visits on 07/12/24.    ASSESSMENT and PLAN    Elisabet was seen today for atrial fibrillation.    Diagnoses and all orders for this visit:    Atrial flutter, unspecified type (HCC)  -     EKG 12 Lead - Clinic Performed    Long term current use of antiarrhythmic drug  -     EKG 12 Lead - Clinic Performed          Overall Impression     Atrial flutter (HCC)  -Recurrent on EKG today and appears symptomatic; likely contributed by severe hypokalemia noted during recent ED visit.  Hypokalemia likely secondary to Florinef.  Will admit and consider cardioversion.  Check potassium/magnesium levels and replete as needed.  Hold off resuming Florinef.  -Likely plan IV amiodarone for 24 hours and resume p.o. load.  Previously on 200 mg daily.  -Long-term; discussed will need LFT/TFTs every 6 months and annual chest x-ray.  -On Eliquis 2.5 mg twice daily.  -Previously discussed EP evaluation for ablation but apprehensive and wanted to hold off.  During inpatient stay, discussed poor candidate

## 2024-07-12 NOTE — PROGRESS NOTES
Aflutter during office visit today. DA for amio gtt.    Upon arrival to floor, pt is SR with PACs. HR 90. /60. PAGE Nieto notified. Per PA, do not give amio bolus and start gtt at 1. Decrease to 0.5 after 6 hours per order.     PAGE Nieto also notified of critical K 2.5. Follow protocol per PA.

## 2024-07-12 NOTE — FLOWSHEET NOTE
07/12/24 1145   Dual Clinician Skin Assessment   Dual Skin Assessment (4 Eyes) X   Second Clinical  (First and Last Name) Ren ESPANA   Skin Integumentary    Skin Integumentary (WDL) X   Skin Color Ecchymosis (comment);Ochoa   Skin Condition/Temp Warm;Dry;Flaky;Fragile;Swollen/edematous   Skin Integrity Abrasion;Wound (see LDA);Scars (comment);Redness;Ecchymosis   Location R FA/BLE wound - consult placed; blanchable redness to sacrum; scattered scars/bruising     4 Eyes Skin Assessment     NAME:  Elisabet Santiago  YOB: 1940  MEDICAL RECORD NUMBER:  [id number]    The patient is being assessed for  Admission    I agree that at least one RN has performed a thorough Head to Toe Skin Assessment on the patient. ALL assessment sites listed below have been assessed.      Areas assessed by both nurses:    Head, Face, Ears, Shoulders, Back, Chest, Arms, Elbows, Hands, Sacrum. Buttock, Coccyx, Ischium, and Legs. Feet and Heels        Does the Patient have a Wound? Yes wound(s) were present on assessment. LDA wound assessment was Initiated and completed by MALORIE       David Prevention initiated by RN: Yes  Wound Care Orders initiated by RN: Yes    Pressure Injury (Stage 3,4, Unstageable, DTI, NWPT, and Complex wounds) if present, place Wound referral order by RN under : Yes    New Ostomies, if present place, Ostomy referral order under : No     Nurse 1 eSignature: Electronically signed by Antonette Sheffield RN on 7/12/24 at 3:54 PM EDT    **SHARE this note so that the co-signing nurse can place an eSignature**    Nurse 2 eSignature: Electronically signed by Ren Dominguez RN on 7/12/24 at 3:56 PM EDT

## 2024-07-13 LAB
ANION GAP SERPL CALC-SCNC: 10 MMOL/L (ref 9–18)
BUN SERPL-MCNC: 11 MG/DL (ref 8–23)
CALCIUM SERPL-MCNC: 8.5 MG/DL (ref 8.8–10.2)
CHLORIDE SERPL-SCNC: 96 MMOL/L (ref 98–107)
CO2 SERPL-SCNC: 31 MMOL/L (ref 20–28)
CREAT SERPL-MCNC: 1 MG/DL (ref 0.6–1.1)
ERYTHROCYTE [DISTWIDTH] IN BLOOD BY AUTOMATED COUNT: 14.8 % (ref 11.9–14.6)
GLUCOSE SERPL-MCNC: 109 MG/DL (ref 70–99)
HCT VFR BLD AUTO: 35.1 % (ref 35.8–46.3)
HGB BLD-MCNC: 11 G/DL (ref 11.7–15.4)
MAGNESIUM SERPL-MCNC: 2.1 MG/DL (ref 1.8–2.4)
MCH RBC QN AUTO: 28.6 PG (ref 26.1–32.9)
MCHC RBC AUTO-ENTMCNC: 31.3 G/DL (ref 31.4–35)
MCV RBC AUTO: 91.2 FL (ref 82–102)
NRBC # BLD: 0 K/UL (ref 0–0.2)
PLATELET # BLD AUTO: 216 K/UL (ref 150–450)
PMV BLD AUTO: 12.6 FL (ref 9.4–12.3)
POTASSIUM SERPL-SCNC: 2.8 MMOL/L (ref 3.5–5.1)
POTASSIUM SERPL-SCNC: 4.1 MMOL/L (ref 3.5–5.1)
RBC # BLD AUTO: 3.85 M/UL (ref 4.05–5.2)
SODIUM SERPL-SCNC: 136 MMOL/L (ref 136–145)
WBC # BLD AUTO: 6.6 K/UL (ref 4.3–11.1)

## 2024-07-13 PROCEDURE — 6370000000 HC RX 637 (ALT 250 FOR IP): Performed by: NURSE PRACTITIONER

## 2024-07-13 PROCEDURE — 36415 COLL VENOUS BLD VENIPUNCTURE: CPT

## 2024-07-13 PROCEDURE — 80048 BASIC METABOLIC PNL TOTAL CA: CPT

## 2024-07-13 PROCEDURE — 85027 COMPLETE CBC AUTOMATED: CPT

## 2024-07-13 PROCEDURE — 84132 ASSAY OF SERUM POTASSIUM: CPT

## 2024-07-13 PROCEDURE — 6360000002 HC RX W HCPCS: Performed by: NURSE PRACTITIONER

## 2024-07-13 PROCEDURE — 96366 THER/PROPH/DIAG IV INF ADDON: CPT

## 2024-07-13 PROCEDURE — G0378 HOSPITAL OBSERVATION PER HR: HCPCS

## 2024-07-13 PROCEDURE — 83735 ASSAY OF MAGNESIUM: CPT

## 2024-07-13 PROCEDURE — 2580000003 HC RX 258: Performed by: PHYSICIAN ASSISTANT

## 2024-07-13 PROCEDURE — 6370000000 HC RX 637 (ALT 250 FOR IP): Performed by: PHYSICIAN ASSISTANT

## 2024-07-13 PROCEDURE — 99232 SBSQ HOSP IP/OBS MODERATE 35: CPT | Performed by: INTERNAL MEDICINE

## 2024-07-13 RX ORDER — POTASSIUM CHLORIDE 20 MEQ/1
40 TABLET, EXTENDED RELEASE ORAL ONCE
Status: COMPLETED | OUTPATIENT
Start: 2024-07-13 | End: 2024-07-13

## 2024-07-13 RX ORDER — AMIODARONE HYDROCHLORIDE 200 MG/1
200 TABLET ORAL 2 TIMES DAILY
Status: DISCONTINUED | OUTPATIENT
Start: 2024-07-13 | End: 2024-07-14 | Stop reason: HOSPADM

## 2024-07-13 RX ORDER — POTASSIUM CHLORIDE 7.45 MG/ML
10 INJECTION INTRAVENOUS
Status: COMPLETED | OUTPATIENT
Start: 2024-07-13 | End: 2024-07-14

## 2024-07-13 RX ADMIN — SODIUM CHLORIDE, PRESERVATIVE FREE 10 ML: 5 INJECTION INTRAVENOUS at 21:18

## 2024-07-13 RX ADMIN — CARBIDOPA AND LEVODOPA 1 TABLET: 25; 250 TABLET ORAL at 18:01

## 2024-07-13 RX ADMIN — SUCRALFATE 1 G: 1 TABLET ORAL at 09:20

## 2024-07-13 RX ADMIN — CARBIDOPA AND LEVODOPA 1 TABLET: 25; 250 TABLET ORAL at 09:15

## 2024-07-13 RX ADMIN — AMIODARONE HYDROCHLORIDE 200 MG: 200 TABLET ORAL at 21:11

## 2024-07-13 RX ADMIN — SODIUM CHLORIDE, PRESERVATIVE FREE 5 ML: 5 INJECTION INTRAVENOUS at 09:15

## 2024-07-13 RX ADMIN — MIDODRINE HYDROCHLORIDE 15 MG: 5 TABLET ORAL at 18:01

## 2024-07-13 RX ADMIN — SUCRALFATE 1 G: 1 TABLET ORAL at 05:23

## 2024-07-13 RX ADMIN — CARBIDOPA AND LEVODOPA 1 TABLET: 25; 250 TABLET ORAL at 21:24

## 2024-07-13 RX ADMIN — SODIUM CHLORIDE: 9 INJECTION, SOLUTION INTRAVENOUS at 10:17

## 2024-07-13 RX ADMIN — SUCRALFATE 1 G: 1 TABLET ORAL at 18:01

## 2024-07-13 RX ADMIN — AMIODARONE HYDROCHLORIDE 200 MG: 200 TABLET ORAL at 09:20

## 2024-07-13 RX ADMIN — POTASSIUM CHLORIDE 10 MEQ: 7.46 INJECTION, SOLUTION INTRAVENOUS at 15:18

## 2024-07-13 RX ADMIN — MIDODRINE HYDROCHLORIDE 15 MG: 5 TABLET ORAL at 09:15

## 2024-07-13 RX ADMIN — FAMOTIDINE 20 MG: 20 TABLET, FILM COATED ORAL at 09:15

## 2024-07-13 RX ADMIN — POTASSIUM CHLORIDE 10 MEQ: 7.46 INJECTION, SOLUTION INTRAVENOUS at 10:06

## 2024-07-13 RX ADMIN — CARBIDOPA AND LEVODOPA 1 TABLET: 25; 250 TABLET ORAL at 12:58

## 2024-07-13 RX ADMIN — SUCRALFATE 1 G: 1 TABLET ORAL at 21:10

## 2024-07-13 RX ADMIN — MIDODRINE HYDROCHLORIDE 15 MG: 5 TABLET ORAL at 11:49

## 2024-07-13 RX ADMIN — POTASSIUM CHLORIDE 40 MEQ: 1500 TABLET, EXTENDED RELEASE ORAL at 09:15

## 2024-07-13 RX ADMIN — LEVOTHYROXINE SODIUM 25 MCG: 0.05 TABLET ORAL at 05:22

## 2024-07-13 RX ADMIN — DONEPEZIL HYDROCHLORIDE 10 MG: 5 TABLET, FILM COATED ORAL at 21:11

## 2024-07-13 RX ADMIN — POTASSIUM CHLORIDE 10 MEQ: 7.46 INJECTION, SOLUTION INTRAVENOUS at 11:48

## 2024-07-13 RX ADMIN — APIXABAN 2.5 MG: 2.5 TABLET, FILM COATED ORAL at 09:15

## 2024-07-13 RX ADMIN — POTASSIUM CHLORIDE 10 MEQ: 7.46 INJECTION, SOLUTION INTRAVENOUS at 12:57

## 2024-07-13 RX ADMIN — ACETAMINOPHEN 650 MG: 325 TABLET ORAL at 21:11

## 2024-07-13 RX ADMIN — APIXABAN 2.5 MG: 2.5 TABLET, FILM COATED ORAL at 21:11

## 2024-07-13 RX ADMIN — OXYBUTYNIN CHLORIDE 5 MG: 5 TABLET ORAL at 21:24

## 2024-07-13 ASSESSMENT — PAIN SCALES - GENERAL
PAINLEVEL_OUTOF10: 3
PAINLEVEL_OUTOF10: 0

## 2024-07-13 ASSESSMENT — PAIN DESCRIPTION - LOCATION: LOCATION: GENERALIZED

## 2024-07-13 ASSESSMENT — PAIN DESCRIPTION - ORIENTATION: ORIENTATION: MID;POSTERIOR

## 2024-07-13 ASSESSMENT — PAIN SCALES - WONG BAKER: WONGBAKER_NUMERICALRESPONSE: NO HURT

## 2024-07-13 NOTE — DISCHARGE SUMMARY
[facility] Discharge Summary     Patient ID:  Elisabet Santiago  [id number]  84 y.o.  1940    Admit date: 7/12/2024    Discharge date:  07/14/24    Admitting Physician: Mekhi Simms MD     Discharge Physician: ARIC Bernal - [facility]/Dr. Link    Admission Diagnoses: Atrial fibrillation with RVR (HCC) [I48.91]    Discharge Diagnoses:     Principal Problem (Resolved):    Atrial flutter with rapid ventricular response (HCC)  Active Problems:    Parkinson's disease    Orthostatic hypotension  Resolved Problems:    Hypokalemia    Localized edema          Cardiology Procedures this admission:    Consults: none    Hospital Course: Patient presented to the Office with complaints of palpitations and shortness of breath and recurrent A Fib confirmed by EKG.  The patient was noted to have a flutter and was directly admitted for IV amio and electrolyte replacement.  Labs on arrival were noted to have K of 2.5, Cr 1.26, Mag 1.6, and stable CBC.      The patient was continued on home OAC.      [id number] Score for Atrial Fibrillation Stroke Risk   Risk   Factors  Component Value   C CHF No 0   H HTN No 0   A2 Age >= 75 Yes,  (84 y.o.) 2   D DM No 0   S2 Prior Stroke/TIA No 0   V Vascular Disease No 0   A Age 65-74 No,  (84 y.o.) 0   Sc Sex female 1    [id number]  Score  3   Score last updated 7/13/24 8:21 AM EDT      The patient was monitored on telemetry.  And Electrolytes were replaced.    The patient spontaneously converted back to sinus rhythm.      The patient felt much better back in sinus rhythm.  On 07/14/24, the patient was feeling much better and remained in sinus rhythm.  The patient was seen and examined by Dr. Arevalo and was determined stable and ready for discharge home.  The patient was instructed on the importance of medication compliance and outpatient follow up.  The patient will follow up with [facility] Dr. Thacker.  Her Potassium was 4.0 prior

## 2024-07-13 NOTE — PROGRESS NOTES
[facility] CARDIOLOGY PROGRESS NOTE           7/13/2024 6:12 AM    Admit Date: 7/12/2024      Subjective:   Maintained sinus rhythm overnight.  No chest pain or any significant dyspnea.  Remains on IV amiodarone.  Potassium level still remain low at 2.6.  Getting replacement at this point.  N  Weight charted at 48.1 kg - 106 pounds  Intake versus output-net +760 cc overnight    ROS:  Cardiovascular:  As noted above    Objective:      Vitals:    07/12/24 1948 07/12/24 2349 07/13/24 0336 07/13/24 0533   BP: (!) 131/59 (!) 124/56 107/64    Pulse: 67 58 68    Resp: 17 18 15    Temp: 97.5 °F (36.4 °C) 97.3 °F (36.3 °C) 97.7 °F (36.5 °C)    TempSrc: Oral Oral Oral    SpO2: 96% 95% 96%    Weight:    48.1 kg (106 lb)   Height:           Physical Exam:  General-No Acute Distress  Neck- supple, no JVD  CV- regular rate and rhythm no MRG  Lung- clear bilaterally  Abd- soft, nontender, nondistended  Ext- no edema bilaterally.  Skin- warm and dry    Data Review:     Lab Results   Component Value Date/Time     07/12/2024 12:32 PM    K 2.5 07/12/2024 12:32 PM    CL 95 07/12/2024 12:32 PM    CO2 33 07/12/2024 12:32 PM    BUN 13 07/12/2024 12:32 PM    CREATININE 1.26 07/12/2024 12:32 PM    GLUCOSE 124 07/12/2024 12:32 PM    CALCIUM 8.9 07/12/2024 12:32 PM         Lab Results   Component Value Date    WBC 5.2 07/12/2024    HGB 11.3 (L) 07/12/2024    HCT 35.3 (L) 07/12/2024    MCV 89.8 07/12/2024     07/12/2024 07/12/24    ECHO (TTE) COMPLETE (PRN CONTRAST/BUBBLE/STRAIN/3D) 07/12/2024  4:41 PM (Final)    Interpretation Summary    Left Ventricle: Normal left ventricular systolic function with a visually estimated EF of 55 - 60%. Left ventricle size is normal. Normal wall thickness. Normal wall motion. Diastolic dysfunction present with increased LAP with normal LV EF.  Pseudonormal mitral inflow pattern noted.  Average E/e' ratio is 13.26.    Left Atrium: Left atrium is mildly dilated. Systolic

## 2024-07-14 VITALS
BODY MASS INDEX: 20.3 KG/M2 | WEIGHT: 107.5 LBS | RESPIRATION RATE: 17 BRPM | DIASTOLIC BLOOD PRESSURE: 65 MMHG | OXYGEN SATURATION: 90 % | HEIGHT: 61 IN | HEART RATE: 55 BPM | SYSTOLIC BLOOD PRESSURE: 127 MMHG | TEMPERATURE: 97.3 F

## 2024-07-14 PROBLEM — I48.91 ATRIAL FIBRILLATION WITH RAPID VENTRICULAR RESPONSE (HCC): Status: ACTIVE | Noted: 2024-07-14

## 2024-07-14 PROBLEM — R60.0 LOCALIZED EDEMA: Status: RESOLVED | Noted: 2024-07-12 | Resolved: 2024-07-14

## 2024-07-14 PROBLEM — I95.1 ORTHOSTATIC HYPOTENSION: Status: RESOLVED | Noted: 2017-08-25 | Resolved: 2024-07-14

## 2024-07-14 PROBLEM — I48.92 ATRIAL FLUTTER WITH RAPID VENTRICULAR RESPONSE (HCC): Status: RESOLVED | Noted: 2024-07-12 | Resolved: 2024-07-14

## 2024-07-14 PROBLEM — E87.6 HYPOKALEMIA: Status: RESOLVED | Noted: 2024-07-12 | Resolved: 2024-07-14

## 2024-07-14 LAB
ANION GAP SERPL CALC-SCNC: 13 MMOL/L (ref 9–18)
BUN SERPL-MCNC: 11 MG/DL (ref 8–23)
CALCIUM SERPL-MCNC: 8.7 MG/DL (ref 8.8–10.2)
CHLORIDE SERPL-SCNC: 97 MMOL/L (ref 98–107)
CO2 SERPL-SCNC: 25 MMOL/L (ref 20–28)
CREAT SERPL-MCNC: 0.97 MG/DL (ref 0.6–1.1)
ERYTHROCYTE [DISTWIDTH] IN BLOOD BY AUTOMATED COUNT: 14.7 % (ref 11.9–14.6)
GLUCOSE SERPL-MCNC: 101 MG/DL (ref 70–99)
HCT VFR BLD AUTO: 39.6 % (ref 35.8–46.3)
HGB BLD-MCNC: 12.5 G/DL (ref 11.7–15.4)
MCH RBC QN AUTO: 29 PG (ref 26.1–32.9)
MCHC RBC AUTO-ENTMCNC: 31.6 G/DL (ref 31.4–35)
MCV RBC AUTO: 91.9 FL (ref 82–102)
NRBC # BLD: 0 K/UL (ref 0–0.2)
PLATELET # BLD AUTO: 232 K/UL (ref 150–450)
PMV BLD AUTO: 13.5 FL (ref 9.4–12.3)
POTASSIUM SERPL-SCNC: 4 MMOL/L (ref 3.5–5.1)
RBC # BLD AUTO: 4.31 M/UL (ref 4.05–5.2)
SODIUM SERPL-SCNC: 135 MMOL/L (ref 136–145)
WBC # BLD AUTO: 10.7 K/UL (ref 4.3–11.1)

## 2024-07-14 PROCEDURE — 36415 COLL VENOUS BLD VENIPUNCTURE: CPT

## 2024-07-14 PROCEDURE — 85027 COMPLETE CBC AUTOMATED: CPT

## 2024-07-14 PROCEDURE — 2140000000 HC CCU INTERMEDIATE R&B

## 2024-07-14 PROCEDURE — 99239 HOSP IP/OBS DSCHRG MGMT >30: CPT | Performed by: INTERNAL MEDICINE

## 2024-07-14 PROCEDURE — 6370000000 HC RX 637 (ALT 250 FOR IP): Performed by: NURSE PRACTITIONER

## 2024-07-14 PROCEDURE — 6370000000 HC RX 637 (ALT 250 FOR IP): Performed by: PHYSICIAN ASSISTANT

## 2024-07-14 PROCEDURE — 96366 THER/PROPH/DIAG IV INF ADDON: CPT

## 2024-07-14 PROCEDURE — 2580000003 HC RX 258: Performed by: PHYSICIAN ASSISTANT

## 2024-07-14 PROCEDURE — G0378 HOSPITAL OBSERVATION PER HR: HCPCS

## 2024-07-14 PROCEDURE — 80048 BASIC METABOLIC PNL TOTAL CA: CPT

## 2024-07-14 RX ORDER — MIDODRINE HYDROCHLORIDE 5 MG/1
15 TABLET ORAL
Qty: 270 TABLET | Refills: 3 | Status: SHIPPED | OUTPATIENT
Start: 2024-07-14

## 2024-07-14 RX ORDER — AMIODARONE HYDROCHLORIDE 200 MG/1
TABLET ORAL
Qty: 90 TABLET | Refills: 1 | Status: SHIPPED | OUTPATIENT
Start: 2024-07-14

## 2024-07-14 RX ORDER — MIDODRINE HYDROCHLORIDE 5 MG/1
15 TABLET ORAL
Qty: 90 TABLET | Refills: 3 | Status: SHIPPED | OUTPATIENT
Start: 2024-07-14 | End: 2024-07-14

## 2024-07-14 RX ADMIN — MIDODRINE HYDROCHLORIDE 15 MG: 5 TABLET ORAL at 08:22

## 2024-07-14 RX ADMIN — LEVOTHYROXINE SODIUM 25 MCG: 0.05 TABLET ORAL at 06:33

## 2024-07-14 RX ADMIN — ACETAMINOPHEN 650 MG: 325 TABLET ORAL at 06:33

## 2024-07-14 RX ADMIN — CARBIDOPA AND LEVODOPA 1 TABLET: 25; 250 TABLET ORAL at 08:22

## 2024-07-14 RX ADMIN — AMIODARONE HYDROCHLORIDE 200 MG: 200 TABLET ORAL at 08:22

## 2024-07-14 RX ADMIN — FAMOTIDINE 20 MG: 20 TABLET, FILM COATED ORAL at 08:22

## 2024-07-14 RX ADMIN — SUCRALFATE 1 G: 1 TABLET ORAL at 06:32

## 2024-07-14 RX ADMIN — APIXABAN 2.5 MG: 2.5 TABLET, FILM COATED ORAL at 08:22

## 2024-07-14 RX ADMIN — SODIUM CHLORIDE, PRESERVATIVE FREE 10 ML: 5 INJECTION INTRAVENOUS at 08:22

## 2024-07-14 ASSESSMENT — PAIN SCALES - GENERAL
PAINLEVEL_OUTOF10: 0
PAINLEVEL_OUTOF10: 4

## 2024-07-14 ASSESSMENT — PAIN DESCRIPTION - DESCRIPTORS: DESCRIPTORS: ACHING;SORE

## 2024-07-14 ASSESSMENT — PAIN DESCRIPTION - ORIENTATION: ORIENTATION: MID;POSTERIOR

## 2024-07-14 ASSESSMENT — PAIN DESCRIPTION - LOCATION: LOCATION: NECK

## 2024-07-14 NOTE — DISCHARGE INSTRUCTIONS
The patient is being discharged home in stable condition on a low saturated fat, low cholesterol and low salt diet. The patient is instructed to advance activities as tolerated to the limit of fatigue or shortness of breath. The patient is instructed to call the office or return to the ER for immediate evaluation for any severe shortness of breath, chest pain, prolonged palpitations, near syncope or syncope.

## 2024-07-14 NOTE — CARE COORDINATION
07/14/24 1429   Services At/After Discharge   Transition of Care Consult (CM Consult) Discharge Planning   Services At/After Discharge None    Resource Information Provided? No   Mode of Transport at Discharge Other (see comment)  (Spouse to transport by car.)   Confirm Follow Up Transport Family   Condition of Participation: Discharge Planning   The Plan for Transition of Care is related to the following treatment goals: Home with spouse's assistance   The Patient and/Or Patient Representative agree with the Discharge Plan? Yes     Patient hospitalized with Afib. Spontaneously converted to sinus rhythm during hospitalization.   CM reviewed discharge summary. No CM needs identified. Spouse to transport home by car.

## 2024-07-14 NOTE — PLAN OF CARE
Problem: Discharge Planning  Goal: Discharge to home or other facility with appropriate resources  7/14/2024 0816 by Corina Manning RN  Outcome: Adequate for Discharge  7/14/2024 0805 by Ren Dominguez RN  Outcome: Progressing  Flowsheets (Taken 7/13/2024 2000 by Gala Muro RN)  Discharge to home or other facility with appropriate resources: Identify barriers to discharge with patient and caregiver     Problem: Skin/Tissue Integrity  Goal: Absence of new skin breakdown  Description: 1.  Monitor for areas of redness and/or skin breakdown  2.  Assess vascular access sites hourly  3.  Every 4-6 hours minimum:  Change oxygen saturation probe site  4.  Every 4-6 hours:  If on nasal continuous positive airway pressure, respiratory therapy assess nares and determine need for appliance change or resting period.  7/14/2024 0816 by Corina Manning RN  Outcome: Adequate for Discharge  7/14/2024 0805 by Ren Dominguez, RN  Outcome: Progressing     Problem: Safety - Adult  Goal: Free from fall injury  7/14/2024 0816 by Corina Manning RN  Outcome: Adequate for Discharge  7/14/2024 0805 by Ren Dominguez, RN  Outcome: Progressing     Problem: ABCDS Injury Assessment  Goal: Absence of physical injury  7/14/2024 0816 by Corina Manning RN  Outcome: Adequate for Discharge  7/14/2024 0805 by Ren Dominguez, RN  Outcome: Progressing     
  Problem: Discharge Planning  Goal: Discharge to home or other facility with appropriate resources  Outcome: Progressing  Flowsheets (Taken 7/12/2024 1145)  Discharge to home or other facility with appropriate resources:   Identify barriers to discharge with patient and caregiver   Arrange for needed discharge resources and transportation as appropriate   Identify discharge learning needs (meds, wound care, etc)   Arrange for interpreters to assist at discharge as needed   Refer to discharge planning if patient needs post-hospital services based on physician order or complex needs related to functional status, cognitive ability or social support system     Problem: Skin/Tissue Integrity  Goal: Absence of new skin breakdown  Description: 1.  Monitor for areas of redness and/or skin breakdown  2.  Assess vascular access sites hourly  3.  Every 4-6 hours minimum:  Change oxygen saturation probe site  4.  Every 4-6 hours:  If on nasal continuous positive airway pressure, respiratory therapy assess nares and determine need for appliance change or resting period.  Outcome: Progressing     Problem: Safety - Adult  Goal: Free from fall injury  Outcome: Progressing     Problem: ABCDS Injury Assessment  Goal: Absence of physical injury  Outcome: Progressing     
  Problem: Discharge Planning  Goal: Discharge to home or other facility with appropriate resources  Outcome: Progressing  Flowsheets (Taken 7/13/2024 2000 by Gala Muro, RN)  Discharge to home or other facility with appropriate resources: Identify barriers to discharge with patient and caregiver     Problem: Skin/Tissue Integrity  Goal: Absence of new skin breakdown  Description: 1.  Monitor for areas of redness and/or skin breakdown  2.  Assess vascular access sites hourly  3.  Every 4-6 hours minimum:  Change oxygen saturation probe site  4.  Every 4-6 hours:  If on nasal continuous positive airway pressure, respiratory therapy assess nares and determine need for appliance change or resting period.  Outcome: Progressing     Problem: Safety - Adult  Goal: Free from fall injury  Outcome: Progressing     Problem: ABCDS Injury Assessment  Goal: Absence of physical injury  Outcome: Progressing     
[id number]

## 2024-07-14 NOTE — PROGRESS NOTES
[facility] PROGRESS NOTE           7/14/2024 8:06 AM    Admit Date: 7/12/2024      Subjective:   Maintained sinus rhythm overnight.  No chest pain or any significant dyspnea.  Remains on IV amiodarone.  Potassium levels have finally corrected.  Weight charted at 48.1 kg - 106 pounds  Intake versus output-net +760 cc overnight    ROS:  Cardiovascular:  As noted above    Objective:      Vitals:    07/13/24 2330 07/14/24 0317 07/14/24 0536 07/14/24 0734   BP: (!) 158/69 (!) 146/67  127/65   Pulse: 55 65  55   Resp: 17 16  17   Temp: 97.3 °F (36.3 °C) 97.9 °F (36.6 °C)  97.3 °F (36.3 °C)   TempSrc: Oral Oral  Oral   SpO2: 94% 93%  90%   Weight:   48.8 kg (107 lb 8 oz)    Height:           Physical Exam:  General-No Acute Distress  Neck- supple, no JVD  CV- regular rate and rhythm no MRG  Lung- clear bilaterally  Abd- soft, nontender, nondistended  Ext- no edema bilaterally.  Skin- warm and dry    Data Review:     Lab Results   Component Value Date/Time     07/14/2024 04:11 AM    K 4.0 07/14/2024 04:11 AM    CL 97 07/14/2024 04:11 AM    CO2 25 07/14/2024 04:11 AM    BUN 11 07/14/2024 04:11 AM    CREATININE 0.97 07/14/2024 04:11 AM    GLUCOSE 101 07/14/2024 04:11 AM    CALCIUM 8.7 07/14/2024 04:11 AM         Lab Results   Component Value Date    WBC 10.7 07/14/2024    HGB 12.5 07/14/2024    HCT 39.6 07/14/2024    MCV 91.9 07/14/2024     07/14/2024 07/12/24    ECHO (TTE) COMPLETE (PRN CONTRAST/BUBBLE/STRAIN/3D) 07/12/2024  4:41 PM (Final)    Interpretation Summary    Left Ventricle: Normal left ventricular systolic function with a visually estimated EF of 55 - 60%. Left ventricle size is normal. Normal wall thickness. Normal wall motion. Diastolic dysfunction present with increased LAP with normal LV EF.  Pseudonormal mitral inflow pattern noted.  Average E/e' ratio is 13.26.    Left Atrium: Left atrium is mildly dilated. Systolic blunting in the pulmonary veins.     Mitral Valve: Mild regurgitation.    Tricuspid Valve: Mildly elevated RVSP, consistent with mild pulmonary hypertension. The estimated RVSP is 40 mmHg.    IVC/SVC:  IVC is dilated.    Sinus rhythm noted at the time of the study with heart rates in the mid to late 70s range.    Signed by: Gera Arevalo MD on 7/12/2024  4:41 PM     Assessment/Plan:       Principal Problem:    Atrial flutter with rapid ventricular response (HCC)  -Paroxysmal-converted to sinus rhythm spontaneously-loaded with IV amiodarone with transition to amiodarone by mouth-200 mg twice daily for 3 days then cut back to 200 mg once daily.  Replacing potassium and magnesium with goal potassium greater than 4.0 and goal magnesium greater than 2.0    Active Problems:    Orthostatic hypotension  -Continue midodrine-dose increased to 15 mg 3 times a day, discontinued Florinef due to hypokalemia      Localized edema  -Bilateral lower extremity edema-likely from Florinef-discontinued      Parkinson's disease-  -likely cause for orthostatic hypotension-counseled on importance of trying to stay as active as possible with fall precautions.      Hypokalemia  Avoid Lasix on discharge and give her potassium chloride 20 mill equivalents daily.  This along with discontinuation of Florinef should help with keeping potassium levels within normal range.  Rechecking BMP in 3 days    -Overall stable from a cardiac perspective for discharge home today.  Florinef has been discontinued, she was continued on potassium and will have a BMP rechecked down the road.  Using compression stockings can be helpful although she will need assistance from her  to put them on every day and take them off at night.  Amiodarone has been initiated  -40 minutes was spent with discharge activities         Gera Arevalo MD  7/14/2024 8:06 AM

## 2024-07-14 NOTE — PROGRESS NOTES
In chart and MAR to clean up MAR in order to print discharge.  Pt has no medication running and all have been stopped on the MAR.

## 2024-07-15 ENCOUNTER — CARE COORDINATION (OUTPATIENT)
Dept: CARE COORDINATION | Facility: CLINIC | Age: 84
End: 2024-07-15

## 2024-07-15 NOTE — CARE COORDINATION
Attempted to reach patient for transitions of care follow up. Unable to reach patient.    Outreach Attempts:   HIPAA compliant voicemail left for patient.     Patient: Elisabet Santiago    Patient : 1940   MRN: [id number]    Reason for Admission: Atrial flutter with rapid ventricular response (   Discharge Date: 24  RURS: Readmission Risk Score: 13.1    Last Discharge Facility       Date Complaint Diagnosis Description Type Department Provider    24  Hospital discharge follow-up ... Admission (Discharged) [id number] Del Castillo MD            Was this an external facility discharge? No    Follow Up Appointment:   Patient does not have a follow up appointment scheduled at time of call.  TBD  Future Appointments         Provider Specialty Dept Phone    2024 3:00 PM Kelvin Durant MD Family Medicine [phone]    10/17/2024 3:30 PM Anali Ramirez MD Neurology [phone]            Plan for follow-up on next business day.      Angelika Bush RN

## 2024-07-16 ENCOUNTER — CARE COORDINATION (OUTPATIENT)
Dept: CARE COORDINATION | Facility: CLINIC | Age: 84
End: 2024-07-16

## 2024-07-16 DIAGNOSIS — I48.92 ATRIAL FLUTTER WITH RAPID VENTRICULAR RESPONSE (HCC): Primary | ICD-10-CM

## 2024-07-16 PROCEDURE — 1111F DSCHRG MED/CURRENT MED MERGE: CPT | Performed by: INTERNAL MEDICINE

## 2024-07-16 NOTE — CARE COORDINATION
Patient Current Location:  Home: [address] [place] [id number]    Care Transition Nurse contacted the patient, spouse/partner  by telephone to perform post hospital discharge assessment, verified name and  as identifiers. Provided introduction to self, and explanation of the Care Transition Nurse role.     Patient: Elisabet Santiago    Patient : 1940   MRN: [id number]    Reason for Admission: Atrial flutter with rapid ventricular response   Discharge Date: 24  RURS: Readmission Risk Score: 13.1      Last Discharge Facility       Date Complaint Diagnosis Description Type Department Provider    24  Hospital discharge follow-up ... Admission (Discharged) [id number] Gera Santos MD            Was this an external facility discharge? No    Additional needs identified to be addressed with provider   No needs identified             Method of communication with provider: none.    Patients top risk factors for readmission: medical condition-see care summary note for history and problems.    Interventions to address risk factors:   Review of patient management of conditions/medications: reviewed discharge instructions, voices understanding of reason for hospitalization and new medications    Care Summary Note:   Atrial flutter with rapid ventricular response (HCC)     Parkinson's disease    Orthostatic hypotension  Resolved Problems:    Hypokalemia    Localized edema    Per spouse patient is doing much better. She is dependent on spouse for ADL's.    Care Transition Nurse reviewed discharge instructions with patient and spouse/partner. The patient and spouse/partner was given an opportunity to ask questions; all questions answered at this time.. The patient and spouse/partner verbalized understanding.   Were discharge instructions available to patient? Yes.   Reviewed appropriate site of care based on symptoms and resources available to patient including: PCP  Specialist  Urgent care

## 2024-07-23 ENCOUNTER — CARE COORDINATION (OUTPATIENT)
Dept: CARE COORDINATION | Facility: CLINIC | Age: 84
End: 2024-07-23

## 2024-07-23 NOTE — CARE COORDINATION
Patient Current Location:  Home: [address] [place] [phone]    Care Transition Nurse contacted the patient, spouse/partner  by telephone. Verified name and  as identifiers.    Additional needs identified to be addressed with provider   No needs identified                 Method of communication with provider: none.       Care Summary Note:   Atrial flutter with rapid ventricular response (HCC)     Parkinson's disease    Orthostatic hypotension  Resolved Problems:    Hypokalemia    Localized edema     Per spouse patient is doing much better. She is dependent on spouse for ADL's.  Upcoming appointment with Cards. For hospital follow up.      Plan of care updates since last contact:  Review of patient management of conditions/medications: spouse voiced understanding.       Advance Care Planning:   Does patient have an Advance Directive: reviewed and current.    Medication Review:  Full medication reconciliation completed during previous call.    Remote Patient Monitoring:  Offered patient enrollment in the Remote Patient Monitoring (RPM) program for in-home monitoring: Patient is not eligible for RPM program because: patient does not have qualifying diagnosis.    Assessments:  Care Transitions Subsequent and Final Call    Subsequent and Final Calls  Care Transitions Interventions  Other Interventions:              Follow Up Appointment:   Reviewed upcoming appointment(s). and [facility] appointment attended as scheduled   Future Appointments         Provider Specialty Dept Phone    2024 3:45 PM Mekhi Simms MD Cardiology [phone]    2024 3:00 PM Kelvin Durant MD Family Medicine [phone]    10/17/2024 3:30 PM Anali Ramirez MD Neurology [phone]            Care Transition Nurse provided contact information.  Plan for follow-up call in 11-14 days based on severity of symptoms and risk factors.  Plan for next call: self management-assess for self management of chronic

## 2024-07-24 ENCOUNTER — OFFICE VISIT (OUTPATIENT)
Age: 84
End: 2024-07-24
Payer: MEDICARE

## 2024-07-24 VITALS
BODY MASS INDEX: 20.58 KG/M2 | WEIGHT: 109 LBS | SYSTOLIC BLOOD PRESSURE: 120 MMHG | HEART RATE: 88 BPM | HEIGHT: 61 IN | DIASTOLIC BLOOD PRESSURE: 62 MMHG

## 2024-07-24 DIAGNOSIS — E87.6 HYPOKALEMIA: ICD-10-CM

## 2024-07-24 DIAGNOSIS — Z79.01 ANTICOAGULANT LONG-TERM USE: ICD-10-CM

## 2024-07-24 DIAGNOSIS — I48.92 ATRIAL FLUTTER, UNSPECIFIED TYPE (HCC): Primary | ICD-10-CM

## 2024-07-24 PROCEDURE — 1111F DSCHRG MED/CURRENT MED MERGE: CPT | Performed by: INTERNAL MEDICINE

## 2024-07-24 PROCEDURE — G8428 CUR MEDS NOT DOCUMENT: HCPCS | Performed by: INTERNAL MEDICINE

## 2024-07-24 PROCEDURE — 93000 ELECTROCARDIOGRAM COMPLETE: CPT | Performed by: INTERNAL MEDICINE

## 2024-07-24 PROCEDURE — G8420 CALC BMI NORM PARAMETERS: HCPCS | Performed by: INTERNAL MEDICINE

## 2024-07-24 PROCEDURE — 1123F ACP DISCUSS/DSCN MKR DOCD: CPT | Performed by: INTERNAL MEDICINE

## 2024-07-24 PROCEDURE — 99214 OFFICE O/P EST MOD 30 MIN: CPT | Performed by: INTERNAL MEDICINE

## 2024-07-24 PROCEDURE — 1090F PRES/ABSN URINE INCON ASSESS: CPT | Performed by: INTERNAL MEDICINE

## 2024-07-24 PROCEDURE — 1036F TOBACCO NON-USER: CPT | Performed by: INTERNAL MEDICINE

## 2024-07-24 ASSESSMENT — ENCOUNTER SYMPTOMS
BACK PAIN: 0
BLURRED VISION: 0
ORTHOPNEA: 0
NAUSEA: 0
BLOATING: 0
ABDOMINAL PAIN: 0
DOUBLE VISION: 0
SHORTNESS OF BREATH: 0
HEMOPTYSIS: 0
VOMITING: 0
COUGH: 0

## 2024-07-30 ENCOUNTER — CARE COORDINATION (OUTPATIENT)
Dept: CARE COORDINATION | Facility: CLINIC | Age: 84
End: 2024-07-30

## 2024-07-30 NOTE — CARE COORDINATION
Care Transitions Note    Follow Up Call     Attempted to reach patient for transitions of care follow up.  Unable to reach patient.      Outreach Attempts:   HIPAA compliant voicemail left for patient.     Follow Up Appointment:   Future Appointments         Provider Specialty Dept Phone    8/21/2024 4:00 PM Mekhi Simms MD Cardiology [phone]    9/23/2024 3:00 PM Kelvin Durant MD Family Medicine [phone]    10/17/2024 3:30 PM Anali Ramirez MD Neurology [phone]            Plan for follow-up call in 6-10 days based on severity of symptoms and risk factors. Plan for next call: self management-diet, hydration, exercise, follow up appointments.     Veronica Jones LPN

## 2024-08-05 DIAGNOSIS — E03.9 ACQUIRED HYPOTHYROIDISM: ICD-10-CM

## 2024-08-05 DIAGNOSIS — I48.92 ATRIAL FLUTTER, UNSPECIFIED TYPE (HCC): Primary | ICD-10-CM

## 2024-08-05 DIAGNOSIS — K21.9 GASTROESOPHAGEAL REFLUX DISEASE, UNSPECIFIED WHETHER ESOPHAGITIS PRESENT: ICD-10-CM

## 2024-08-05 RX ORDER — FAMOTIDINE 40 MG/1
40 TABLET, FILM COATED ORAL DAILY
Qty: 90 TABLET | Refills: 3 | Status: SHIPPED | OUTPATIENT
Start: 2024-08-05

## 2024-08-05 RX ORDER — LEVOTHYROXINE SODIUM 25 UG/1
25 TABLET ORAL DAILY
Qty: 90 TABLET | Refills: 3 | Status: SHIPPED | OUTPATIENT
Start: 2024-08-05

## 2024-08-05 NOTE — TELEPHONE ENCOUNTER
Patient would like to have a BMP done her for Dr Simms office    Please call patient to schedule

## 2024-08-05 NOTE — TELEPHONE ENCOUNTER
Put in future lab orders if we can schedule a lab appointment    Sent in prescription for:     Requested Prescriptions     Signed Prescriptions Disp Refills    famotidine (PEPCID) 40 MG tablet 90 tablet 3     Sig: Take 1 tablet by mouth daily     Authorizing Provider: SERGEY CAIN    levothyroxine (SYNTHROID) 25 MCG tablet 90 tablet 3     Sig: Take 1 tablet by mouth Daily     Authorizing Provider: SERGEY CAIN

## 2024-08-08 ENCOUNTER — CARE COORDINATION (OUTPATIENT)
Dept: CARE COORDINATION | Facility: CLINIC | Age: 84
End: 2024-08-08

## 2024-08-08 NOTE — CARE COORDINATION
Care Transitions Note    Follow Up Call     Patient Current Location:  Home: [address] [place] [phone]    LPN Care Coordinator contacted the spouse/partner  by telephone. Verified name and  as identifiers.    Additional needs identified to be addressed with provider   No needs identified                 Method of communication with provider: none.    Plan of care updates since last contact:  Education: Veronica Jones LPN  [phone]  All scheduled appointments.        Advance Care Planning:   Does patient have an Advance Directive: reviewed during previous call, see note. .    Medication Review:  Medications changed since last call, reviewed today.     Remote Patient Monitoring:  Offered patient enrollment in the Remote Patient Monitoring (RPM) program for in-home monitoring: Patient is not eligible for RPM program because: na .    Assessments:  No changes since last call    Follow Up Appointment:   Reviewed upcoming appointment(s).  Future Appointments         Provider Specialty Dept Phone    2024 4:00 PM Mekhi Simms MD Cardiology [phone]    2024 3:00 PM Kelvin Durant MD Family Medicine [phone]    10/17/2024 3:30 PM Anali Ramirez MD Neurology [phone]            LPN Care Coordinator provided contact information.  Plan for follow-up call in 6-10 days based on severity of symptoms and risk factors.  Plan for next call: self management-diet, hydration, exercise, follow up appointments.       Veronica Jones LPN

## 2024-08-09 ENCOUNTER — LAB (OUTPATIENT)
Dept: FAMILY MEDICINE CLINIC | Facility: CLINIC | Age: 84
End: 2024-08-09

## 2024-08-09 DIAGNOSIS — I48.92 ATRIAL FLUTTER, UNSPECIFIED TYPE (HCC): ICD-10-CM

## 2024-08-09 LAB
ANION GAP SERPL CALC-SCNC: 12 MMOL/L (ref 9–18)
BUN SERPL-MCNC: 15 MG/DL (ref 8–23)
CALCIUM SERPL-MCNC: 9 MG/DL (ref 8.8–10.2)
CHLORIDE SERPL-SCNC: 106 MMOL/L (ref 98–107)
CO2 SERPL-SCNC: 26 MMOL/L (ref 20–28)
CREAT SERPL-MCNC: 1.38 MG/DL (ref 0.6–1.1)
GLUCOSE SERPL-MCNC: 85 MG/DL (ref 70–99)
MAGNESIUM SERPL-MCNC: 1.8 MG/DL (ref 1.8–2.4)
POTASSIUM SERPL-SCNC: 4.5 MMOL/L (ref 3.5–5.1)
SODIUM SERPL-SCNC: 144 MMOL/L (ref 136–145)

## 2024-08-14 ENCOUNTER — CARE COORDINATION (OUTPATIENT)
Dept: CARE COORDINATION | Facility: CLINIC | Age: 84
End: 2024-08-14

## 2024-08-14 NOTE — CARE COORDINATION
Care Transitions Note    Final Call     Patient Current Location:  Home: [address] [place] [phone]    LPN Care Coordinator contacted the spouse/partner  by telephone. Verified name and  as identifiers.    Patient graduated from the Care Transitions program on 2024.  Patient/family has the ability to self manage at this time..      Advance Care Planning:   Does patient have an Advance Directive: reviewed during previous call, see note. .    Handoff:   Patient was not referred to the ACM team due to patient declined services.      Assessments:  No changes since last call    Upcoming Appointments:    Future Appointments         Provider Specialty Dept Phone    2024 4:00 PM Mekhi Simms MD Cardiology [phone]    2024 3:00 PM Kelvin Durant MD Family Medicine [phone]    10/17/2024 3:30 PM Anali Ramirez MD Neurology [phone]            Patient has agreed to contact primary care provider and/or specialist for any further questions, concerns, or needs.    Veronica Jones LPN

## 2024-08-21 ENCOUNTER — OFFICE VISIT (OUTPATIENT)
Age: 84
End: 2024-08-21
Payer: MEDICARE

## 2024-08-21 VITALS
WEIGHT: 107 LBS | DIASTOLIC BLOOD PRESSURE: 76 MMHG | HEART RATE: 91 BPM | BODY MASS INDEX: 20.2 KG/M2 | HEIGHT: 61 IN | SYSTOLIC BLOOD PRESSURE: 138 MMHG

## 2024-08-21 DIAGNOSIS — Z79.01 ANTICOAGULANT LONG-TERM USE: ICD-10-CM

## 2024-08-21 DIAGNOSIS — I48.92 ATRIAL FLUTTER, UNSPECIFIED TYPE (HCC): Primary | ICD-10-CM

## 2024-08-21 PROCEDURE — 99214 OFFICE O/P EST MOD 30 MIN: CPT | Performed by: INTERNAL MEDICINE

## 2024-08-21 PROCEDURE — 1036F TOBACCO NON-USER: CPT | Performed by: INTERNAL MEDICINE

## 2024-08-21 PROCEDURE — 1090F PRES/ABSN URINE INCON ASSESS: CPT | Performed by: INTERNAL MEDICINE

## 2024-08-21 PROCEDURE — G8427 DOCREV CUR MEDS BY ELIG CLIN: HCPCS | Performed by: INTERNAL MEDICINE

## 2024-08-21 PROCEDURE — 93000 ELECTROCARDIOGRAM COMPLETE: CPT | Performed by: INTERNAL MEDICINE

## 2024-08-21 PROCEDURE — 1123F ACP DISCUSS/DSCN MKR DOCD: CPT | Performed by: INTERNAL MEDICINE

## 2024-08-21 PROCEDURE — G8420 CALC BMI NORM PARAMETERS: HCPCS | Performed by: INTERNAL MEDICINE

## 2024-08-21 ASSESSMENT — ENCOUNTER SYMPTOMS
SHORTNESS OF BREATH: 0
ABDOMINAL PAIN: 0
BLURRED VISION: 0
NAUSEA: 0
HEMOPTYSIS: 0
DOUBLE VISION: 0
VOMITING: 0
BACK PAIN: 0
BLOATING: 0
COUGH: 0
ORTHOPNEA: 0

## 2024-08-21 NOTE — PROGRESS NOTES
time.  Also ideally hold off with issues with orthostatic hypotension     Orthostatic hypotension-likely autonomic and related to Parkinson's disease.  Also some vagal symptoms noted; likely contributed to the last near syncope episode.  Discussed avoidance/countermeasures.  Patient states some symptoms might have predated her diagnosis of Parkinson's.    Hold off Florinef with issues with hypokalemia/some increased lower extremity edema; currently on midodrine 2.5 mg 3 times daily and can increase further if needed.  Consideration for droxidopa if needed in the future. Discussed compression hoses/salt.   Consideration for binders if needed.  Discussed permissive hypertension      Palpitations-not controlled; in the setting currently of recurrent tachyarrhythmias.  See above.    Hypokalemia  -See above; hold off resuming Florinef.  Also hold off Lasix/potassium which was prescribed during ER visit from 7/2/2024 due to lower extremity edema; was noted to be severely hypokalemic at the time.    -Resolved hypokalemia on repeat BMP from 8/9/24 at 4.5      Carotid stenosis-noted hospital read but per vascular surgery evaluation, felt to be less than 50% bilaterally.  Followed by vascular surgery    Return in about 4 months (around 12/21/2024).     Mekhi Simms MD  8/21/2024  4:23 PM

## 2024-08-28 ENCOUNTER — INITIAL CONSULT (OUTPATIENT)
Age: 84
End: 2024-08-28
Payer: MEDICARE

## 2024-08-28 VITALS
SYSTOLIC BLOOD PRESSURE: 116 MMHG | HEART RATE: 95 BPM | BODY MASS INDEX: 19.52 KG/M2 | WEIGHT: 103.4 LBS | DIASTOLIC BLOOD PRESSURE: 68 MMHG | HEIGHT: 61 IN

## 2024-08-28 DIAGNOSIS — I48.3 TYPICAL ATRIAL FLUTTER (HCC): Primary | ICD-10-CM

## 2024-08-28 PROCEDURE — 1123F ACP DISCUSS/DSCN MKR DOCD: CPT | Performed by: INTERNAL MEDICINE

## 2024-08-28 PROCEDURE — 1090F PRES/ABSN URINE INCON ASSESS: CPT | Performed by: INTERNAL MEDICINE

## 2024-08-28 PROCEDURE — G8427 DOCREV CUR MEDS BY ELIG CLIN: HCPCS | Performed by: INTERNAL MEDICINE

## 2024-08-28 PROCEDURE — 1036F TOBACCO NON-USER: CPT | Performed by: INTERNAL MEDICINE

## 2024-08-28 PROCEDURE — G8420 CALC BMI NORM PARAMETERS: HCPCS | Performed by: INTERNAL MEDICINE

## 2024-08-28 PROCEDURE — 99204 OFFICE O/P NEW MOD 45 MIN: CPT | Performed by: INTERNAL MEDICINE

## 2024-08-28 PROCEDURE — 93000 ELECTROCARDIOGRAM COMPLETE: CPT | Performed by: INTERNAL MEDICINE

## 2024-08-28 RX ORDER — TERBINAFINE HYDROCHLORIDE 250 MG/1
250 TABLET ORAL DAILY
COMMUNITY

## 2024-08-28 NOTE — PROGRESS NOTES
120/62       Gen: well appearing, well developed, NAD  Eyes: Pupils equal, EOMI  CV: irreg irreg, no M/R/G, normal JVD, normal distal pulses, no JUSTINA  Pulm: CTAB, no accessory muscle uses, no wheezes, crackles  GI: soft, NT, ND  Neuro: Alert and oriented    Medical problems and test results were reviewed with the patient today.     No results found for any visits on 08/28/24.    EKG:  (EKG has been independently visualized by me with interpretation below)  atrial flutter, rate 95  -Poor R-wave progression -nonspecific -consider old anterior infarct.      Elisabet was seen today for consultation and atrial flutter.    Diagnoses and all orders for this visit:    Atrial flutter (HCC)  -     EKG 12 lead  -     Case Request EP Lab  -     Basic Metabolic Panel; Future  -     CBC; Future  -     Magnesium; Future        ASSESSMENT and PLAN  1. Typical atrial flutter: I had a discussion with the Pt today regarding rate and rhythm control strategies, rhythm control strategy treatment options including DCCV, antiarrhythmic therapy, catheter ablation and the combination of the above. I discussed at length the advantages and disadvantages of all treatment strategies.  We discussed the option of cardiac ablation in detail, including discussion of some of the more common, however, very low risks of the procedure including access complications and risks of damage to the heart or surrounding structures.  Another discussion of possible complications from cardiac ablation will be addressed again during the consent process the day of the procedure.  The patient will also meet with the staff anesthesiologist the day of the procedure to discuss some of the same possible risks, as well as other risks, specific to the patient undergoing anesthesia.  Pt will undergo a transesophageal echocardiogram immediately prior to catheter insertion to completely exclude the possibility of left atrial appendage thrombus. We will follow up with patient  post hospitalization. We discussed issues have largely been atrial flutter, but she is at high risk for AF and this could recur post ablation.        --CATALINA followed by aflutter ablation        --hold eliquis the day prior     2. CVA protection: eliquis 5mg Q12H    3. Amiodarone: discussed risks of amio toxicity, plan to stop post ablation     Patient has been instructed and agrees to call our office with any issues or other concerns related to their cardiac condition(s) and/or complaint(s).    No follow-up provider specified.      Cain Dai MD, MS  Cardiology/Electrophysiology  08/28/24  10:01 AM

## 2024-09-02 DIAGNOSIS — R32 URINARY INCONTINENCE, UNSPECIFIED TYPE: ICD-10-CM

## 2024-09-02 DIAGNOSIS — N95.2 ATROPHIC VAGINITIS: ICD-10-CM

## 2024-09-02 RX ORDER — CONJUGATED ESTROGENS 0.62 MG/G
CREAM VAGINAL
Qty: 30 G | Refills: 2 | Status: SHIPPED | OUTPATIENT
Start: 2024-09-02

## 2024-09-02 RX ORDER — FLUDROCORTISONE ACETATE 0.1 MG/1
TABLET ORAL DAILY
Qty: 90 TABLET | Refills: 3 | Status: SHIPPED | OUTPATIENT
Start: 2024-09-02

## 2024-09-02 RX ORDER — MIDODRINE HYDROCHLORIDE 10 MG/1
10 TABLET ORAL 3 TIMES DAILY
Qty: 270 TABLET | Refills: 3 | Status: SHIPPED | OUTPATIENT
Start: 2024-09-02

## 2024-09-02 RX ORDER — OXYBUTYNIN CHLORIDE 5 MG/1
TABLET ORAL
Qty: 180 TABLET | Refills: 3 | Status: SHIPPED | OUTPATIENT
Start: 2024-09-02

## 2024-09-20 DIAGNOSIS — I48.3 TYPICAL ATRIAL FLUTTER (HCC): ICD-10-CM

## 2024-09-20 LAB
ANION GAP SERPL CALC-SCNC: 11 MMOL/L (ref 9–18)
BUN SERPL-MCNC: 18 MG/DL (ref 8–23)
CALCIUM SERPL-MCNC: 9.2 MG/DL (ref 8.8–10.2)
CHLORIDE SERPL-SCNC: 104 MMOL/L (ref 98–107)
CO2 SERPL-SCNC: 23 MMOL/L (ref 20–28)
CREAT SERPL-MCNC: 1.44 MG/DL (ref 0.6–1.1)
ERYTHROCYTE [DISTWIDTH] IN BLOOD BY AUTOMATED COUNT: 17.1 % (ref 11.9–14.6)
GLUCOSE SERPL-MCNC: 120 MG/DL (ref 70–99)
HCT VFR BLD AUTO: 41 % (ref 35.8–46.3)
HGB BLD-MCNC: 12.2 G/DL (ref 11.7–15.4)
MAGNESIUM SERPL-MCNC: 2.2 MG/DL (ref 1.8–2.4)
MCH RBC QN AUTO: 28.6 PG (ref 26.1–32.9)
MCHC RBC AUTO-ENTMCNC: 29.8 G/DL (ref 31.4–35)
MCV RBC AUTO: 96.2 FL (ref 82–102)
NRBC # BLD: 0 K/UL (ref 0–0.2)
PLATELET # BLD AUTO: 187 K/UL (ref 150–450)
PMV BLD AUTO: 13.5 FL (ref 9.4–12.3)
POTASSIUM SERPL-SCNC: 5.3 MMOL/L (ref 3.5–5.1)
RBC # BLD AUTO: 4.26 M/UL (ref 4.05–5.2)
SODIUM SERPL-SCNC: 139 MMOL/L (ref 136–145)
WBC # BLD AUTO: 9.8 K/UL (ref 4.3–11.1)

## 2024-09-23 ENCOUNTER — OFFICE VISIT (OUTPATIENT)
Dept: FAMILY MEDICINE CLINIC | Facility: CLINIC | Age: 84
End: 2024-09-23
Payer: MEDICARE

## 2024-09-23 ENCOUNTER — TELEPHONE (OUTPATIENT)
Age: 84
End: 2024-09-23

## 2024-09-23 VITALS
HEART RATE: 89 BPM | DIASTOLIC BLOOD PRESSURE: 75 MMHG | BODY MASS INDEX: 20.5 KG/M2 | TEMPERATURE: 97.9 F | HEIGHT: 61 IN | SYSTOLIC BLOOD PRESSURE: 129 MMHG | RESPIRATION RATE: 16 BRPM | OXYGEN SATURATION: 98 % | WEIGHT: 108.6 LBS

## 2024-09-23 DIAGNOSIS — E55.9 VITAMIN D DEFICIENCY, UNSPECIFIED: ICD-10-CM

## 2024-09-23 DIAGNOSIS — E03.9 ACQUIRED HYPOTHYROIDISM: ICD-10-CM

## 2024-09-23 DIAGNOSIS — R41.3 MEMORY IMPAIRMENT: ICD-10-CM

## 2024-09-23 DIAGNOSIS — G20.A1 PARKINSON'S DISEASE, UNSPECIFIED WHETHER DYSKINESIA PRESENT, UNSPECIFIED WHETHER MANIFESTATIONS FLUCTUATE (HCC): ICD-10-CM

## 2024-09-23 DIAGNOSIS — R30.0 DYSURIA: Primary | ICD-10-CM

## 2024-09-23 DIAGNOSIS — E87.6 HYPOKALEMIA: ICD-10-CM

## 2024-09-23 DIAGNOSIS — C17.0 MALIGNANT NEOPLASM OF DUODENUM (HCC): ICD-10-CM

## 2024-09-23 DIAGNOSIS — I48.92 ATRIAL FLUTTER, UNSPECIFIED TYPE (HCC): ICD-10-CM

## 2024-09-23 DIAGNOSIS — B35.1 ONYCHOMYCOSIS: ICD-10-CM

## 2024-09-23 PROCEDURE — 99214 OFFICE O/P EST MOD 30 MIN: CPT | Performed by: FAMILY MEDICINE

## 2024-09-23 PROCEDURE — 81002 URINALYSIS NONAUTO W/O SCOPE: CPT | Performed by: FAMILY MEDICINE

## 2024-09-23 PROCEDURE — 1090F PRES/ABSN URINE INCON ASSESS: CPT | Performed by: FAMILY MEDICINE

## 2024-09-23 PROCEDURE — G8420 CALC BMI NORM PARAMETERS: HCPCS | Performed by: FAMILY MEDICINE

## 2024-09-23 PROCEDURE — 1036F TOBACCO NON-USER: CPT | Performed by: FAMILY MEDICINE

## 2024-09-23 PROCEDURE — 1123F ACP DISCUSS/DSCN MKR DOCD: CPT | Performed by: FAMILY MEDICINE

## 2024-09-23 PROCEDURE — G8427 DOCREV CUR MEDS BY ELIG CLIN: HCPCS | Performed by: FAMILY MEDICINE

## 2024-09-23 RX ORDER — DONEPEZIL HYDROCHLORIDE 10 MG/1
10 TABLET, FILM COATED ORAL NIGHTLY
Qty: 90 TABLET | Refills: 3 | Status: SHIPPED | OUTPATIENT
Start: 2024-09-23

## 2024-09-23 RX ORDER — FLUCONAZOLE 150 MG/1
300 TABLET ORAL WEEKLY
Qty: 8 TABLET | Refills: 2 | Status: SHIPPED | OUTPATIENT
Start: 2024-09-23 | End: 2024-12-22

## 2024-09-23 RX ORDER — AMIODARONE HYDROCHLORIDE 200 MG/1
200 TABLET ORAL DAILY
Qty: 90 TABLET | Refills: 3 | Status: ON HOLD | OUTPATIENT
Start: 2024-09-23 | End: 2024-09-26 | Stop reason: HOSPADM

## 2024-09-23 RX ORDER — POTASSIUM CHLORIDE 750 MG/1
10 TABLET, EXTENDED RELEASE ORAL DAILY
Qty: 90 TABLET | Refills: 3 | Status: SHIPPED | OUTPATIENT
Start: 2024-09-23

## 2024-09-23 RX ORDER — POTASSIUM CHLORIDE 1500 MG/1
20 TABLET, EXTENDED RELEASE ORAL 2 TIMES DAILY
Qty: 180 TABLET | Refills: 3 | Status: CANCELLED | OUTPATIENT
Start: 2024-09-23

## 2024-09-24 DIAGNOSIS — E87.6 HYPOKALEMIA: ICD-10-CM

## 2024-09-24 DIAGNOSIS — I48.92 ATRIAL FLUTTER, UNSPECIFIED TYPE (HCC): ICD-10-CM

## 2024-09-24 LAB
ANION GAP SERPL CALC-SCNC: 9 MMOL/L (ref 9–18)
BASOPHILS # BLD: 0.1 K/UL (ref 0–0.2)
BASOPHILS NFR BLD: 1 % (ref 0–2)
BILIRUBIN, URINE, POC: NEGATIVE
BLOOD URINE, POC: NEGATIVE
BUN SERPL-MCNC: 12 MG/DL (ref 8–23)
CALCIUM SERPL-MCNC: 8.6 MG/DL (ref 8.8–10.2)
CHLORIDE SERPL-SCNC: 103 MMOL/L (ref 98–107)
CO2 SERPL-SCNC: 25 MMOL/L (ref 20–28)
CREAT SERPL-MCNC: 1.41 MG/DL (ref 0.6–1.1)
DIFFERENTIAL METHOD BLD: ABNORMAL
EOSINOPHIL # BLD: 0.1 K/UL (ref 0–0.8)
EOSINOPHIL NFR BLD: 1 % (ref 0.5–7.8)
ERYTHROCYTE [DISTWIDTH] IN BLOOD BY AUTOMATED COUNT: 17.2 % (ref 11.9–14.6)
GLUCOSE SERPL-MCNC: 101 MG/DL (ref 70–99)
GLUCOSE URINE, POC: NEGATIVE
HCT VFR BLD AUTO: 37.4 % (ref 35.8–46.3)
HGB BLD-MCNC: 11.6 G/DL (ref 11.7–15.4)
IMM GRANULOCYTES # BLD AUTO: 0 K/UL (ref 0–0.5)
IMM GRANULOCYTES NFR BLD AUTO: 0 % (ref 0–5)
KETONES, URINE, POC: NEGATIVE
LEUKOCYTE ESTERASE, URINE, POC: NEGATIVE
LYMPHOCYTES # BLD: 0.9 K/UL (ref 0.5–4.6)
LYMPHOCYTES NFR BLD: 11 % (ref 13–44)
MAGNESIUM SERPL-MCNC: 1.9 MG/DL (ref 1.8–2.4)
MCH RBC QN AUTO: 29.7 PG (ref 26.1–32.9)
MCHC RBC AUTO-ENTMCNC: 31 G/DL (ref 31.4–35)
MCV RBC AUTO: 95.7 FL (ref 82–102)
MONOCYTES # BLD: 0.6 K/UL (ref 0.1–1.3)
MONOCYTES NFR BLD: 7 % (ref 4–12)
NEUTS SEG # BLD: 6.7 K/UL (ref 1.7–8.2)
NEUTS SEG NFR BLD: 80 % (ref 43–78)
NITRITE, URINE, POC: NEGATIVE
NRBC # BLD: 0 K/UL (ref 0–0.2)
PH, URINE, POC: 5 (ref 4.6–8)
PLATELET # BLD AUTO: 180 K/UL (ref 150–450)
PMV BLD AUTO: 13.3 FL (ref 9.4–12.3)
POTASSIUM SERPL-SCNC: 4.5 MMOL/L (ref 3.5–5.1)
PROTEIN,URINE, POC: NORMAL
RBC # BLD AUTO: 3.91 M/UL (ref 4.05–5.2)
SODIUM SERPL-SCNC: 138 MMOL/L (ref 136–145)
SPECIFIC GRAVITY, URINE, POC: 1.01 (ref 1–1.03)
URINALYSIS CLARITY, POC: CLEAR
URINALYSIS COLOR, POC: YELLOW
UROBILINOGEN, POC: NORMAL
WBC # BLD AUTO: 8.4 K/UL (ref 4.3–11.1)

## 2024-09-25 ENCOUNTER — ANESTHESIA EVENT (OUTPATIENT)
Dept: CARDIAC CATH/INVASIVE PROCEDURES | Age: 84
End: 2024-09-25
Payer: MEDICARE

## 2024-09-25 RX ORDER — SODIUM CHLORIDE 9 MG/ML
INJECTION, SOLUTION INTRAVENOUS PRN
Status: CANCELLED | OUTPATIENT
Start: 2024-09-25

## 2024-09-25 RX ORDER — LIDOCAINE HYDROCHLORIDE 10 MG/ML
1 INJECTION, SOLUTION INFILTRATION; PERINEURAL
Status: CANCELLED | OUTPATIENT
Start: 2024-09-25 | End: 2024-09-26

## 2024-09-25 RX ORDER — SODIUM CHLORIDE 0.9 % (FLUSH) 0.9 %
5-40 SYRINGE (ML) INJECTION PRN
Status: CANCELLED | OUTPATIENT
Start: 2024-09-25

## 2024-09-25 RX ORDER — SODIUM CHLORIDE 0.9 % (FLUSH) 0.9 %
5-40 SYRINGE (ML) INJECTION EVERY 12 HOURS SCHEDULED
Status: CANCELLED | OUTPATIENT
Start: 2024-09-25

## 2024-09-25 RX ORDER — NALOXONE HYDROCHLORIDE 0.4 MG/ML
INJECTION, SOLUTION INTRAMUSCULAR; INTRAVENOUS; SUBCUTANEOUS PRN
Status: CANCELLED | OUTPATIENT
Start: 2024-09-25

## 2024-09-25 RX ORDER — HALOPERIDOL 5 MG/ML
1 INJECTION INTRAMUSCULAR
Status: CANCELLED | OUTPATIENT
Start: 2024-09-25 | End: 2024-09-26

## 2024-09-25 RX ORDER — SODIUM CHLORIDE, SODIUM LACTATE, POTASSIUM CHLORIDE, CALCIUM CHLORIDE 600; 310; 30; 20 MG/100ML; MG/100ML; MG/100ML; MG/100ML
INJECTION, SOLUTION INTRAVENOUS CONTINUOUS
Status: CANCELLED | OUTPATIENT
Start: 2024-09-25

## 2024-09-25 RX ORDER — IBUPROFEN 600 MG/1
1 TABLET ORAL PRN
Status: CANCELLED | OUTPATIENT
Start: 2024-09-25

## 2024-09-25 RX ORDER — DEXTROSE MONOHYDRATE 100 MG/ML
INJECTION, SOLUTION INTRAVENOUS CONTINUOUS PRN
Status: CANCELLED | OUTPATIENT
Start: 2024-09-25

## 2024-09-25 RX ORDER — HYDROMORPHONE HYDROCHLORIDE 2 MG/ML
0.25 INJECTION, SOLUTION INTRAMUSCULAR; INTRAVENOUS; SUBCUTANEOUS EVERY 5 MIN PRN
Status: CANCELLED | OUTPATIENT
Start: 2024-09-25

## 2024-09-25 RX ORDER — ONDANSETRON 2 MG/ML
4 INJECTION INTRAMUSCULAR; INTRAVENOUS
Status: CANCELLED | OUTPATIENT
Start: 2024-09-25 | End: 2024-09-26

## 2024-09-25 RX ORDER — OXYCODONE HYDROCHLORIDE 5 MG/1
5 TABLET ORAL
Status: CANCELLED | OUTPATIENT
Start: 2024-09-25 | End: 2024-09-26

## 2024-09-25 NOTE — RESULT ENCOUNTER NOTE
Blood sugar was 101, potassium and magnesium levels are normal.  Creatinine is stable at 1.4 hemoglobin is good at 11.6

## 2024-09-26 ENCOUNTER — HOSPITAL ENCOUNTER (OUTPATIENT)
Age: 84
Setting detail: OUTPATIENT SURGERY
Discharge: HOME | End: 2024-09-26
Attending: INTERNAL MEDICINE | Admitting: INTERNAL MEDICINE
Payer: MEDICARE

## 2024-09-26 ENCOUNTER — ANESTHESIA (OUTPATIENT)
Dept: CARDIAC CATH/INVASIVE PROCEDURES | Age: 84
End: 2024-09-26
Payer: MEDICARE

## 2024-09-26 ENCOUNTER — APPOINTMENT (OUTPATIENT)
Dept: CARDIAC CATH/INVASIVE PROCEDURES | Age: 84
End: 2024-09-26
Attending: INTERNAL MEDICINE
Payer: MEDICARE

## 2024-09-26 VITALS
BODY MASS INDEX: 20.77 KG/M2 | SYSTOLIC BLOOD PRESSURE: 148 MMHG | DIASTOLIC BLOOD PRESSURE: 65 MMHG | HEIGHT: 61 IN | HEART RATE: 75 BPM | WEIGHT: 110 LBS | OXYGEN SATURATION: 98 % | TEMPERATURE: 96.8 F | RESPIRATION RATE: 24 BRPM

## 2024-09-26 DIAGNOSIS — I48.3 TYPICAL ATRIAL FLUTTER (HCC): ICD-10-CM

## 2024-09-26 LAB
ECHO BSA: 1.47 M2
ECHO BSA: 1.47 M2
EKG ATRIAL RATE: 293 BPM
EKG DIAGNOSIS: NORMAL
EKG Q-T INTERVAL: 274 MS
EKG QRS DURATION: 78 MS
EKG QTC CALCULATION (BAZETT): 320 MS
EKG R AXIS: 32 DEGREES
EKG T AXIS: 249 DEGREES
EKG VENTRICULAR RATE: 82 BPM

## 2024-09-26 PROCEDURE — C1759 CATH, INTRA ECHOCARDIOGRAPHY: HCPCS | Performed by: INTERNAL MEDICINE

## 2024-09-26 PROCEDURE — 2709999900 HC NON-CHARGEABLE SUPPLY: Performed by: INTERNAL MEDICINE

## 2024-09-26 PROCEDURE — C1760 CLOSURE DEV, VASC: HCPCS | Performed by: INTERNAL MEDICINE

## 2024-09-26 PROCEDURE — 2580000003 HC RX 258: Performed by: NURSE ANESTHETIST, CERTIFIED REGISTERED

## 2024-09-26 PROCEDURE — 2500000003 HC RX 250 WO HCPCS: Performed by: INTERNAL MEDICINE

## 2024-09-26 PROCEDURE — 93312 ECHO TRANSESOPHAGEAL: CPT

## 2024-09-26 PROCEDURE — C1730 CATH, EP, 19 OR FEW ELECT: HCPCS | Performed by: INTERNAL MEDICINE

## 2024-09-26 PROCEDURE — 93010 ELECTROCARDIOGRAM REPORT: CPT | Performed by: INTERNAL MEDICINE

## 2024-09-26 PROCEDURE — C1894 INTRO/SHEATH, NON-LASER: HCPCS | Performed by: INTERNAL MEDICINE

## 2024-09-26 PROCEDURE — 2500000003 HC RX 250 WO HCPCS: Performed by: NURSE ANESTHETIST, CERTIFIED REGISTERED

## 2024-09-26 PROCEDURE — C1732 CATH, EP, DIAG/ABL, 3D/VECT: HCPCS | Performed by: INTERNAL MEDICINE

## 2024-09-26 PROCEDURE — 2720000010 HC SURG SUPPLY STERILE: Performed by: INTERNAL MEDICINE

## 2024-09-26 PROCEDURE — 93005 ELECTROCARDIOGRAM TRACING: CPT | Performed by: INTERNAL MEDICINE

## 2024-09-26 PROCEDURE — 6360000002 HC RX W HCPCS: Performed by: NURSE ANESTHETIST, CERTIFIED REGISTERED

## 2024-09-26 PROCEDURE — 3700000001 HC ADD 15 MINUTES (ANESTHESIA): Performed by: INTERNAL MEDICINE

## 2024-09-26 PROCEDURE — 3700000000 HC ANESTHESIA ATTENDED CARE: Performed by: INTERNAL MEDICINE

## 2024-09-26 PROCEDURE — 93653 COMPRE EP EVAL TX SVT: CPT | Performed by: INTERNAL MEDICINE

## 2024-09-26 RX ORDER — LIDOCAINE HYDROCHLORIDE AND EPINEPHRINE 10; 10 MG/ML; UG/ML
INJECTION, SOLUTION INFILTRATION; PERINEURAL PRN
Status: DISCONTINUED | OUTPATIENT
Start: 2024-09-26 | End: 2024-09-26 | Stop reason: HOSPADM

## 2024-09-26 RX ORDER — SODIUM CHLORIDE, SODIUM LACTATE, POTASSIUM CHLORIDE, CALCIUM CHLORIDE 600; 310; 30; 20 MG/100ML; MG/100ML; MG/100ML; MG/100ML
INJECTION, SOLUTION INTRAVENOUS
Status: DISCONTINUED | OUTPATIENT
Start: 2024-09-26 | End: 2024-09-26 | Stop reason: SDUPTHER

## 2024-09-26 RX ORDER — LIDOCAINE HYDROCHLORIDE 20 MG/ML
INJECTION, SOLUTION EPIDURAL; INFILTRATION; INTRACAUDAL; PERINEURAL
Status: DISCONTINUED | OUTPATIENT
Start: 2024-09-26 | End: 2024-09-26 | Stop reason: SDUPTHER

## 2024-09-26 RX ORDER — PROPOFOL 10 MG/ML
INJECTION, EMULSION INTRAVENOUS
Status: DISCONTINUED | OUTPATIENT
Start: 2024-09-26 | End: 2024-09-26 | Stop reason: SDUPTHER

## 2024-09-26 RX ADMIN — SODIUM CHLORIDE, SODIUM LACTATE, POTASSIUM CHLORIDE, AND CALCIUM CHLORIDE: 600; 310; 30; 20 INJECTION, SOLUTION INTRAVENOUS at 11:46

## 2024-09-26 RX ADMIN — LIDOCAINE HYDROCHLORIDE 40 MG: 20 INJECTION, SOLUTION EPIDURAL; INFILTRATION; INTRACAUDAL; PERINEURAL at 11:57

## 2024-09-26 RX ADMIN — PROPOFOL 100 MCG/KG/MIN: 10 INJECTION, EMULSION INTRAVENOUS at 11:58

## 2024-09-26 RX ADMIN — PROPOFOL 20 MG: 10 INJECTION, EMULSION INTRAVENOUS at 11:57

## 2024-10-04 ENCOUNTER — TELEPHONE (OUTPATIENT)
Dept: INTERNAL MEDICINE CLINIC | Facility: CLINIC | Age: 84
End: 2024-10-04

## 2024-10-15 ENCOUNTER — TELEPHONE (OUTPATIENT)
Dept: FAMILY MEDICINE CLINIC | Facility: CLINIC | Age: 84
End: 2024-10-15

## 2024-10-15 DIAGNOSIS — G20.A1 PARKINSON'S DISEASE, UNSPECIFIED WHETHER DYSKINESIA PRESENT, UNSPECIFIED WHETHER MANIFESTATIONS FLUCTUATE (HCC): ICD-10-CM

## 2024-10-15 DIAGNOSIS — I48.92 ATRIAL FLUTTER, UNSPECIFIED TYPE (HCC): ICD-10-CM

## 2024-10-15 DIAGNOSIS — E55.9 VITAMIN D DEFICIENCY, UNSPECIFIED: ICD-10-CM

## 2024-10-15 DIAGNOSIS — E03.9 ACQUIRED HYPOTHYROIDISM: ICD-10-CM

## 2024-10-15 DIAGNOSIS — C17.0 MALIGNANT NEOPLASM OF DUODENUM (HCC): ICD-10-CM

## 2024-10-15 DIAGNOSIS — E87.6 HYPOKALEMIA: Primary | ICD-10-CM

## 2024-10-17 ENCOUNTER — TELEPHONE (OUTPATIENT)
Dept: FAMILY MEDICINE CLINIC | Facility: CLINIC | Age: 84
End: 2024-10-17

## 2024-10-17 ENCOUNTER — OFFICE VISIT (OUTPATIENT)
Dept: NEUROLOGY | Age: 84
End: 2024-10-17
Payer: MEDICARE

## 2024-10-17 VITALS
HEART RATE: 97 BPM | WEIGHT: 109 LBS | SYSTOLIC BLOOD PRESSURE: 151 MMHG | BODY MASS INDEX: 20.6 KG/M2 | OXYGEN SATURATION: 98 % | DIASTOLIC BLOOD PRESSURE: 74 MMHG

## 2024-10-17 DIAGNOSIS — R25.1 TREMOR: ICD-10-CM

## 2024-10-17 DIAGNOSIS — L98.9 SKIN LESION OF LEFT LEG: Primary | ICD-10-CM

## 2024-10-17 DIAGNOSIS — I95.1 ORTHOSTATIC HYPOTENSION: ICD-10-CM

## 2024-10-17 DIAGNOSIS — G20.A1 PARKINSON'S DISEASE WITHOUT DYSKINESIA, UNSPECIFIED WHETHER MANIFESTATIONS FLUCTUATE (HCC): Primary | ICD-10-CM

## 2024-10-17 DIAGNOSIS — G20.A1 COGNITIVE DEFICIT DUE TO PARKINSON'S DISEASE (HCC): ICD-10-CM

## 2024-10-17 PROCEDURE — 1123F ACP DISCUSS/DSCN MKR DOCD: CPT | Performed by: PSYCHIATRY & NEUROLOGY

## 2024-10-17 PROCEDURE — 1090F PRES/ABSN URINE INCON ASSESS: CPT | Performed by: PSYCHIATRY & NEUROLOGY

## 2024-10-17 PROCEDURE — 1036F TOBACCO NON-USER: CPT | Performed by: PSYCHIATRY & NEUROLOGY

## 2024-10-17 PROCEDURE — 99215 OFFICE O/P EST HI 40 MIN: CPT | Performed by: PSYCHIATRY & NEUROLOGY

## 2024-10-17 PROCEDURE — G8420 CALC BMI NORM PARAMETERS: HCPCS | Performed by: PSYCHIATRY & NEUROLOGY

## 2024-10-17 PROCEDURE — G8484 FLU IMMUNIZE NO ADMIN: HCPCS | Performed by: PSYCHIATRY & NEUROLOGY

## 2024-10-17 PROCEDURE — G8427 DOCREV CUR MEDS BY ELIG CLIN: HCPCS | Performed by: PSYCHIATRY & NEUROLOGY

## 2024-10-17 RX ORDER — TRAMADOL HYDROCHLORIDE 50 MG/1
50 TABLET ORAL EVERY 6 HOURS PRN
COMMUNITY

## 2024-10-17 RX ORDER — CARBIDOPA AND LEVODOPA 50; 200 MG/1; MG/1
1 TABLET, EXTENDED RELEASE ORAL NIGHTLY
COMMUNITY
End: 2024-10-17 | Stop reason: SDUPTHER

## 2024-10-17 RX ORDER — CARBIDOPA AND LEVODOPA 50; 200 MG/1; MG/1
1 TABLET, EXTENDED RELEASE ORAL NIGHTLY
Qty: 90 TABLET | Refills: 3 | Status: SHIPPED | OUTPATIENT
Start: 2024-10-17

## 2024-10-17 ASSESSMENT — ENCOUNTER SYMPTOMS
CONSTIPATION: 0
BACK PAIN: 0

## 2024-10-17 NOTE — PROGRESS NOTES
10/17/24  Elisabet Santiago        Chief Complaint:  Chief Complaint   Patient presents with    Follow-up       Follow up for Parkinson's disease         Parkinson's Disease Diagnosed:  2009 tremors in head but they began in 2003. They were not visible but she could \"feel it\"       HPI:   Elisabet Santiago, 84 y.o.,female here in clinic follow up for continued management of Parkinson's Disease. She has been doing well since her last visit. She does have A.flutter but has been doing well. BP/OH is stable. Sinemet is working well for her and she denies wearing off btw doses. She is having trouble with short term memory but things seem to come back to her. She was concerned about this issue and she has been on aricept for some time. I started her on namenda butit gave her headaches.   Balance is stable. Swallowing is fine. Voice is unchanged.   She is sleeping ok but gets up every 2 hrs to urinate.     Current Neuro related meds:  Sinemet  mg, 1 tab QID (8am, 1pm, 5pm, 8:30pm)  Sinemet ER  mg, 1 tablet QHS     Florinef 0.1 mg, 1 tab daily  Midodrine 10mg 1 tab TID  Aricept 10mg 1 tab Qhs  Valium 10mg BID as needed  Namenda 10mg BID - not taking due to headaches.   Tramadol 50mg 1 tab q 6 hours - (PCP)            Review of Systems:  Review of Systems   Constitutional:  Negative for appetite change.   HENT:  Negative for hearing loss and tinnitus.    Eyes:  Negative for visual disturbance.   Gastrointestinal:  Negative for constipation.   Musculoskeletal:  Negative for back pain and neck pain.   Neurological:  Positive for numbness. Negative for dizziness, tremors, seizures, speech difficulty and headaches.   Psychiatric/Behavioral:  Negative for hallucinations and sleep disturbance. The patient is nervous/anxious.          Past Surgical History:   Procedure Laterality Date    BREAST BIOPSY Left     CHOLECYSTECTOMY, LAPAROSCOPIC  2001    COLONOSCOPY N/A 12/28/2017    COLONOSCOPY/ 19

## 2024-10-17 NOTE — TELEPHONE ENCOUNTER
Neurology reached out to me and said she has got a suspicious skin lesion on her left anterior tibia.  Will put in a referral to dermatology and may want to let the patient and her  know so she will be aware of an appointment.

## 2024-10-18 ENCOUNTER — LAB (OUTPATIENT)
Dept: FAMILY MEDICINE CLINIC | Facility: CLINIC | Age: 84
End: 2024-10-18

## 2024-10-18 DIAGNOSIS — E55.9 VITAMIN D DEFICIENCY, UNSPECIFIED: ICD-10-CM

## 2024-10-18 DIAGNOSIS — E03.9 ACQUIRED HYPOTHYROIDISM: ICD-10-CM

## 2024-10-18 DIAGNOSIS — E87.6 HYPOKALEMIA: ICD-10-CM

## 2024-10-18 DIAGNOSIS — I48.92 ATRIAL FLUTTER, UNSPECIFIED TYPE (HCC): ICD-10-CM

## 2024-10-18 LAB
25(OH)D3 SERPL-MCNC: 24.5 NG/ML (ref 30–100)
ALBUMIN SERPL-MCNC: 3.8 G/DL (ref 3.2–4.6)
ALBUMIN/GLOB SERPL: 1.3 (ref 1–1.9)
ALP SERPL-CCNC: 169 U/L (ref 35–104)
ALT SERPL-CCNC: 25 U/L (ref 8–45)
ANION GAP SERPL CALC-SCNC: 10 MMOL/L (ref 9–18)
AST SERPL-CCNC: 52 U/L (ref 15–37)
BASOPHILS # BLD: 0.1 K/UL (ref 0–0.2)
BASOPHILS NFR BLD: 1 % (ref 0–2)
BILIRUB SERPL-MCNC: 0.4 MG/DL (ref 0–1.2)
BUN SERPL-MCNC: 15 MG/DL (ref 8–23)
CALCIUM SERPL-MCNC: 9.2 MG/DL (ref 8.8–10.2)
CHLORIDE SERPL-SCNC: 102 MMOL/L (ref 98–107)
CO2 SERPL-SCNC: 27 MMOL/L (ref 20–28)
CREAT SERPL-MCNC: 1.22 MG/DL (ref 0.6–1.1)
DIFFERENTIAL METHOD BLD: ABNORMAL
EOSINOPHIL # BLD: 0 K/UL (ref 0–0.8)
EOSINOPHIL NFR BLD: 1 % (ref 0.5–7.8)
ERYTHROCYTE [DISTWIDTH] IN BLOOD BY AUTOMATED COUNT: 17.4 % (ref 11.9–14.6)
GLOBULIN SER CALC-MCNC: 2.9 G/DL (ref 2.3–3.5)
GLUCOSE SERPL-MCNC: 83 MG/DL (ref 70–99)
HCT VFR BLD AUTO: 38.4 % (ref 35.8–46.3)
HGB BLD-MCNC: 12.2 G/DL (ref 11.7–15.4)
IMM GRANULOCYTES # BLD AUTO: 0 K/UL (ref 0–0.5)
IMM GRANULOCYTES NFR BLD AUTO: 0 % (ref 0–5)
LYMPHOCYTES # BLD: 0.7 K/UL (ref 0.5–4.6)
LYMPHOCYTES NFR BLD: 11 % (ref 13–44)
MAGNESIUM SERPL-MCNC: 1.8 MG/DL (ref 1.8–2.4)
MCH RBC QN AUTO: 29.5 PG (ref 26.1–32.9)
MCHC RBC AUTO-ENTMCNC: 31.8 G/DL (ref 31.4–35)
MCV RBC AUTO: 92.8 FL (ref 82–102)
MONOCYTES # BLD: 0.5 K/UL (ref 0.1–1.3)
MONOCYTES NFR BLD: 8 % (ref 4–12)
NEUTS SEG # BLD: 4.9 K/UL (ref 1.7–8.2)
NEUTS SEG NFR BLD: 79 % (ref 43–78)
NRBC # BLD: 0 K/UL (ref 0–0.2)
PLATELET # BLD AUTO: 214 K/UL (ref 150–450)
PMV BLD AUTO: 13.2 FL (ref 9.4–12.3)
POTASSIUM SERPL-SCNC: 4.7 MMOL/L (ref 3.5–5.1)
PROT SERPL-MCNC: 6.7 G/DL (ref 6.3–8.2)
RBC # BLD AUTO: 4.14 M/UL (ref 4.05–5.2)
SODIUM SERPL-SCNC: 139 MMOL/L (ref 136–145)
T4 FREE SERPL-MCNC: 0.7 NG/DL (ref 0.9–1.7)
TSH, 3RD GENERATION: 26.4 UIU/ML (ref 0.27–4.2)
WBC # BLD AUTO: 6.2 K/UL (ref 4.3–11.1)

## 2024-10-20 NOTE — RESULT ENCOUNTER NOTE
The vitamin D is low at 24 with a goal above 30.  Kidney function and liver enzymes are good.  Blood sugar is good at 83.  Thyroid labs suggest that it is underactive but please verify that she is taking the medication as well as her vitamin D.  Hemoglobin is good at 12.2.  Magnesium level is normal.  Would like to start on weekly vitamin D as well as increase her thyroid medication if she is taking it consistently.  Also make sure she got the message about referral to dermatology as neurology had seen a skin lesion that they wanted to have checked out and notified me.  Let me know if she has any additional questions

## 2024-10-21 ENCOUNTER — TELEPHONE (OUTPATIENT)
Dept: FAMILY MEDICINE CLINIC | Facility: CLINIC | Age: 84
End: 2024-10-21

## 2024-10-21 DIAGNOSIS — E03.9 ACQUIRED HYPOTHYROIDISM: ICD-10-CM

## 2024-10-21 RX ORDER — LEVOTHYROXINE SODIUM 25 UG/1
25 TABLET ORAL DAILY
Qty: 90 TABLET | Refills: 3 | Status: SHIPPED | OUTPATIENT
Start: 2024-10-21

## 2024-10-21 RX ORDER — ERGOCALCIFEROL 1.25 MG/1
50000 CAPSULE, LIQUID FILLED ORAL WEEKLY
Qty: 12 CAPSULE | Refills: 1 | Status: SHIPPED | OUTPATIENT
Start: 2024-10-21

## 2024-10-21 NOTE — TELEPHONE ENCOUNTER
----- Message from Dr. Kelvin Durant MD sent at 10/20/2024  7:10 PM EDT -----  The vitamin D is low at 24 with a goal above 30.  Kidney function and liver enzymes are good.  Blood sugar is good at 83.  Thyroid labs suggest that it is underactive but please verify that she is taking the medication as well as her vitamin D.  Hemoglobin is good at 12.2.  Magnesium level is normal.  Would like to start on weekly vitamin D as well as increase her thyroid medication if she is taking it consistently.  Also make sure she got the message about referral to dermatology as neurology had seen a skin lesion that they wanted to have checked out and notified me.  Let me know if she has any additional questions

## 2024-10-21 NOTE — TELEPHONE ENCOUNTER
Rx for Vit D to be sent to [facility].   Pt stating she is not taking the Synthroid.  Never received it from [facility].   Will need both Rx's sent.  Pt and  had been informed of referral to Dr Huang.

## 2024-10-21 NOTE — TELEPHONE ENCOUNTER
Sent in prescription for:     Requested Prescriptions     Signed Prescriptions Disp Refills    vitamin D (ERGOCALCIFEROL) 1.25 MG (86151 UT) CAPS capsule 12 capsule 1     Sig: Take 1 capsule by mouth once a week     Authorizing Provider: SERGEY CAIN    levothyroxine (SYNTHROID) 25 MCG tablet 90 tablet 3     Sig: Take 1 tablet by mouth Daily     Authorizing Provider: SERGEY CAIN

## 2024-10-30 ENCOUNTER — OFFICE VISIT (OUTPATIENT)
Age: 84
End: 2024-10-30

## 2024-10-30 VITALS
HEIGHT: 61 IN | SYSTOLIC BLOOD PRESSURE: 112 MMHG | HEART RATE: 109 BPM | WEIGHT: 109 LBS | DIASTOLIC BLOOD PRESSURE: 68 MMHG | BODY MASS INDEX: 20.58 KG/M2

## 2024-10-30 DIAGNOSIS — I48.0 PAROXYSMAL ATRIAL FIBRILLATION (HCC): ICD-10-CM

## 2024-10-30 DIAGNOSIS — I48.3 TYPICAL ATRIAL FLUTTER (HCC): Primary | ICD-10-CM

## 2024-10-30 NOTE — PROGRESS NOTES
[facility] CARDIOLOGY, [address], [address]  [place] [address]  PHONE: [phone]  Elisabet Santiago  1940    Chief Complant:    Chief Complaint   Patient presents with    Atrial Flutter      Consultation is requested by [unfilled] for evaluation of Atrial Flutter    Reason for Consultation: atrial flutter    History:  Elisabet Santiago is a very pleasant 84 y.o. female with a past medical and cardiac history significant for orthostatic hypotension, Parkinson's disease, atrial flutter s/p DCCV and amiodarone and presents for EP consultation for atrial flutter. She is s/p aflutter ablation, feels OK, some minor palpitations, no chest pain, SOB, RAMIREZ.    Cardiac PMH: (Old records have been reviewed and summarized below)  TTE (7/12/24): EF 55-60%  EKG (7/12/24) personally viewed and interpreted: probable atrial flutter with slow cycle length, cannot fully exclude low RA tachcyardia    Reviewed office note Dr. Ramirez 10/17/24    Past Medical History, Past Surgical History, Family history, Social History, and Medications were all reviewed with the patient today and updated as necessary.     Current Outpatient Medications   Medication Sig Dispense Refill    vitamin D (ERGOCALCIFEROL) 1.25 MG (64209 UT) CAPS capsule Take 1 capsule by mouth once a week 12 capsule 1    levothyroxine (SYNTHROID) 25 MCG tablet Take 1 tablet by mouth Daily 90 tablet 3    traMADol (ULTRAM) 50 MG tablet Take 1 tablet by mouth every 6 hours as needed for Pain.      carbidopa-levodopa (SINEMET CR)  MG per extended release tablet Take 1 tablet by mouth nightly 90 tablet 3    donepezil (ARICEPT) 10 MG tablet Take 1 tablet by mouth nightly 90 tablet 3    potassium chloride (K-TAB) 10 MEQ extended release tablet Take 1 tablet by mouth daily 90 tablet 3    fluconazole (DIFLUCAN) 150 MG tablet Take 2 tablets by mouth once a week 8 tablet 2    estrogens conjugated (PREMARIN) 0.625 MG/GM CREA vaginal cream PLACE 0.5

## 2024-11-26 ENCOUNTER — OFFICE VISIT (OUTPATIENT)
Age: 84
End: 2024-11-26
Payer: MEDICARE

## 2024-11-26 VITALS
HEIGHT: 61 IN | DIASTOLIC BLOOD PRESSURE: 62 MMHG | BODY MASS INDEX: 20.77 KG/M2 | HEART RATE: 129 BPM | SYSTOLIC BLOOD PRESSURE: 116 MMHG | WEIGHT: 110 LBS

## 2024-11-26 DIAGNOSIS — I48.91 ATRIAL FIBRILLATION WITH RAPID VENTRICULAR RESPONSE (HCC): Primary | ICD-10-CM

## 2024-11-26 PROCEDURE — 1160F RVW MEDS BY RX/DR IN RCRD: CPT | Performed by: PHYSICIAN ASSISTANT

## 2024-11-26 PROCEDURE — 1090F PRES/ABSN URINE INCON ASSESS: CPT | Performed by: PHYSICIAN ASSISTANT

## 2024-11-26 PROCEDURE — 99214 OFFICE O/P EST MOD 30 MIN: CPT | Performed by: PHYSICIAN ASSISTANT

## 2024-11-26 PROCEDURE — 1036F TOBACCO NON-USER: CPT | Performed by: PHYSICIAN ASSISTANT

## 2024-11-26 PROCEDURE — 93000 ELECTROCARDIOGRAM COMPLETE: CPT | Performed by: PHYSICIAN ASSISTANT

## 2024-11-26 PROCEDURE — G8420 CALC BMI NORM PARAMETERS: HCPCS | Performed by: PHYSICIAN ASSISTANT

## 2024-11-26 PROCEDURE — 1123F ACP DISCUSS/DSCN MKR DOCD: CPT | Performed by: PHYSICIAN ASSISTANT

## 2024-11-26 PROCEDURE — 1159F MED LIST DOCD IN RCRD: CPT | Performed by: PHYSICIAN ASSISTANT

## 2024-11-26 PROCEDURE — 1125F AMNT PAIN NOTED PAIN PRSNT: CPT | Performed by: PHYSICIAN ASSISTANT

## 2024-11-26 PROCEDURE — G8427 DOCREV CUR MEDS BY ELIG CLIN: HCPCS | Performed by: PHYSICIAN ASSISTANT

## 2024-11-26 PROCEDURE — G8484 FLU IMMUNIZE NO ADMIN: HCPCS | Performed by: PHYSICIAN ASSISTANT

## 2024-11-26 NOTE — PROGRESS NOTES
[facility] CARDIOLOGY, [address], [address]  [place] [address]  PHONE: [phone]  Elisabet Santiago  1940    Chief Complant:    Chief Complaint   Patient presents with    Atrial Fibrillation     History:  Elisabet Santiago is a very pleasant 84 y.o. female with a past medical and cardiac history significant for orthostatic hypotension, Parkinson's disease, atrial flutter s/p DCCV and amiodarone and presents for EP consultation for atrial flutter. She is s/p aflutter ablation 9/26/2024. Amiodarone was discontinued after ablation. At one month follow up she was found to be in atrial fibrillation. Patient wore monitor that showed 100% afib burden with avg rate 104. She presents today for follow up visit. Patient reports feeling well. Admits to occasional palpitations at night but mostly asymptomatic.     Cardiac PMH: (Old records have been reviewed and summarized below)  TTE (7/12/24): EF 55-60%  EKG (7/12/24) personally viewed and interpreted: probable atrial flutter with slow cycle length, cannot fully exclude low RA tachycardia    Monitor (11/8/2024):  Atrial Fibrillation/Flutter occurred continuously (100% burden), ranging  from  bpm (avg of 104 bpm). Isolated VEs were rare (<1.0%), VE  Couplets were rare (<1.0%), and no VE Triplets were present.  Reviewed office note Dr. Dai 10/30/2024    Past Medical History, Past Surgical History, Family history, Social History, and Medications were all reviewed with the patient today and updated as necessary.     Current Outpatient Medications   Medication Sig Dispense Refill    vitamin D (ERGOCALCIFEROL) 1.25 MG (09724 UT) CAPS capsule Take 1 capsule by mouth once a week 12 capsule 1    levothyroxine (SYNTHROID) 25 MCG tablet Take 1 tablet by mouth Daily 90 tablet 3    traMADol (ULTRAM) 50 MG tablet Take 1 tablet by mouth every 6 hours as needed for Pain.      carbidopa-levodopa (SINEMET CR)  MG per extended release tablet Take 1

## 2024-12-04 ENCOUNTER — OFFICE VISIT (OUTPATIENT)
Age: 84
End: 2024-12-04
Payer: MEDICARE

## 2024-12-04 VITALS
HEIGHT: 61 IN | DIASTOLIC BLOOD PRESSURE: 88 MMHG | WEIGHT: 111 LBS | BODY MASS INDEX: 20.96 KG/M2 | HEART RATE: 106 BPM | SYSTOLIC BLOOD PRESSURE: 162 MMHG

## 2024-12-04 DIAGNOSIS — I48.91 ATRIAL FIBRILLATION WITH RAPID VENTRICULAR RESPONSE (HCC): ICD-10-CM

## 2024-12-04 DIAGNOSIS — I48.3 TYPICAL ATRIAL FLUTTER (HCC): Primary | ICD-10-CM

## 2024-12-04 PROCEDURE — 99214 OFFICE O/P EST MOD 30 MIN: CPT | Performed by: INTERNAL MEDICINE

## 2024-12-04 PROCEDURE — G8420 CALC BMI NORM PARAMETERS: HCPCS | Performed by: INTERNAL MEDICINE

## 2024-12-04 PROCEDURE — 1036F TOBACCO NON-USER: CPT | Performed by: INTERNAL MEDICINE

## 2024-12-04 PROCEDURE — 1090F PRES/ABSN URINE INCON ASSESS: CPT | Performed by: INTERNAL MEDICINE

## 2024-12-04 PROCEDURE — 1126F AMNT PAIN NOTED NONE PRSNT: CPT | Performed by: INTERNAL MEDICINE

## 2024-12-04 PROCEDURE — G8484 FLU IMMUNIZE NO ADMIN: HCPCS | Performed by: INTERNAL MEDICINE

## 2024-12-04 PROCEDURE — G8427 DOCREV CUR MEDS BY ELIG CLIN: HCPCS | Performed by: INTERNAL MEDICINE

## 2024-12-04 PROCEDURE — 1123F ACP DISCUSS/DSCN MKR DOCD: CPT | Performed by: INTERNAL MEDICINE

## 2024-12-04 PROCEDURE — 1159F MED LIST DOCD IN RCRD: CPT | Performed by: INTERNAL MEDICINE

## 2024-12-04 PROCEDURE — 93000 ELECTROCARDIOGRAM COMPLETE: CPT | Performed by: INTERNAL MEDICINE

## 2024-12-04 ASSESSMENT — ENCOUNTER SYMPTOMS
ABDOMINAL PAIN: 0
BACK PAIN: 0
HEMOPTYSIS: 0
BLURRED VISION: 0
NAUSEA: 0
COUGH: 0
SHORTNESS OF BREATH: 0
ORTHOPNEA: 0
VOMITING: 0
DOUBLE VISION: 0
BLOATING: 0

## 2024-12-04 NOTE — PROGRESS NOTES
focal deficit present.      Mental Status: She is alert.   Psychiatric:         Mood and Affect: Mood normal.         Medical problems and test results were reviewed with the patient today.     No results found for this or any previous visit (from the past 672 hour(s)).    Lab Results   Component Value Date/Time    CHOL 187 03/15/2021 08:50 AM    HDL 65 03/15/2021 08:50 AM     03/15/2021 08:50 AM    VLDL 17 03/15/2021 08:50 AM       No results found for any visits on 12/04/24.    ASSESSMENT and PLAN    Elisabet was seen today for irregular heart beat.    Diagnoses and all orders for this visit:    Typical atrial flutter (HCC)  -     EKG 12 Lead    Atrial fibrillation with rapid ventricular response (HCC)  -     EKG 12 Lead    Other orders  -     apixaban (ELIQUIS) 2.5 MG TABS tablet; Take 1 tablet by mouth 2 times daily          Overall Impression     Atrial flutter (HCC)  -Recurrent on EKG with some mild RVR; previously admitted due to symptomatic RVR 7/12/24 and self converted.  Likely previously contributed by severe hypokalemia recently which was likely secondary to Florinef.  Hold off resuming Florinef; noted on the list today and discussed ensuring she is not taking it.  -Appears amiodarone stopped by EP post flutter ablation; EKG today with recurrent coarse A-fib/flutter.  -Discussed with EP (Valarie) and patient will be contacted as currently agreeable for AV moshe ablation/device.  Defer add-on therapy for rate control at this time with prior issues with low BPs limiting therapy.  -On Eliquis 2.5 mg twice daily.  -Previously discussed EP evaluation for ablation but apprehensive and wanted to hold off.  During inpatient stay, discussed poor candidate for rate control meds with issues with orthostatic hypotension likely related to dysautonomia with Parkinson's disease; previously was maintained on midodrine/Florinef  -Echo with preserved EF and no significant left atrial enlargement.    Anticoagulation

## 2024-12-05 ENCOUNTER — TELEPHONE (OUTPATIENT)
Age: 84
End: 2024-12-05

## 2024-12-05 NOTE — TELEPHONE ENCOUNTER
Called and spoke with patient about scheduling BIV/AVN with .    Patient stated she was not sure she wanted to complete the surgery. Mentioned she has a procedure already scheduled to remove cancer from her leg.    Notified Dr. Dai, Dr. Simms, and PAGE Nieto.

## 2024-12-17 NOTE — PROGRESS NOTES
1/22/2021         COLONOSCOPY N/A 1/22/2021    COLONOSCOPY/ BMI 20 performed by Andrade Tan MD at [facility]    EP DEVICE PROCEDURE N/A 9/26/2024    Ablation A-flutter performed by Cain Dai MD at [facility] CARDIAC CATH LAB    ERCP  06/2016    GI  09/12/2016    [facility]    GI  2/2012    bowel obstruction     GI  4/2016 at [facility]    colonoscopy    HERNIA REPAIR  2014 at [facility]    mid abd    HYSTERECTOMY (CERVIX STATUS UNKNOWN)  1993    &  BLADDER TACK    ORTHOPEDIC SURGERY Right 2008    R FOOT SURG     HEEL SPUR    UPPER GASTROINTESTINAL ENDOSCOPY  1/22/2021         UPPER GASTROINTESTINAL ENDOSCOPY  08/2016    failed ampullectomy     Family History   Problem Relation Age of Onset    Lung Disease Mother     Cancer Father         prostate    Cancer Sister         colon cancer    Cancer Brother      Social History     Tobacco Use    Smoking status: Never     Passive exposure: Never    Smokeless tobacco: Never   Substance Use Topics    Alcohol use: No     Alcohol/week: 0.0 standard drinks of alcohol     PHYSICAL EXAM:   /76   Pulse (!) 110   Ht 1.549 m (5' 1\")   Wt 49.4 kg (109 lb)   BMI 20.60 kg/m²      Wt Readings from Last 3 Encounters:   12/18/24 49.4 kg (109 lb)   12/04/24 50.3 kg (111 lb)   11/26/24 49.9 kg (110 lb)     BP Readings from Last 3 Encounters:   12/18/24 124/76   12/04/24 (!) 162/88   11/26/24 116/62     Gen: well appearing, well developed, NAD  Eyes: Pupils equal, EOMI  CV: irreg irreg, no M/R/G, normal JVD, normal distal pulses, no JUSTINA  Pulm: CTAB, no accessory muscle uses, no wheezes, crackles  GI: soft, NT, ND  Neuro: Alert and oriented    Medical problems and test results were reviewed with the patient today.     No results found for any visits on 12/18/24.    EKG:  (EKG has been independently visualized by me with interpretation below)  Atrial fibrillation, rate 129    Myrtis was seen today for atrial fibrillation.    Diagnoses and all orders for this

## 2024-12-18 ENCOUNTER — OFFICE VISIT (OUTPATIENT)
Age: 84
End: 2024-12-18
Payer: MEDICARE

## 2024-12-18 VITALS
DIASTOLIC BLOOD PRESSURE: 76 MMHG | WEIGHT: 109 LBS | SYSTOLIC BLOOD PRESSURE: 124 MMHG | BODY MASS INDEX: 20.58 KG/M2 | HEIGHT: 61 IN | HEART RATE: 110 BPM

## 2024-12-18 DIAGNOSIS — I48.91 ATRIAL FIBRILLATION WITH RAPID VENTRICULAR RESPONSE (HCC): Primary | ICD-10-CM

## 2024-12-18 PROCEDURE — 93000 ELECTROCARDIOGRAM COMPLETE: CPT | Performed by: PHYSICIAN ASSISTANT

## 2024-12-18 PROCEDURE — G8420 CALC BMI NORM PARAMETERS: HCPCS | Performed by: PHYSICIAN ASSISTANT

## 2024-12-18 PROCEDURE — 1123F ACP DISCUSS/DSCN MKR DOCD: CPT | Performed by: PHYSICIAN ASSISTANT

## 2024-12-18 PROCEDURE — G8484 FLU IMMUNIZE NO ADMIN: HCPCS | Performed by: PHYSICIAN ASSISTANT

## 2024-12-18 PROCEDURE — 1036F TOBACCO NON-USER: CPT | Performed by: PHYSICIAN ASSISTANT

## 2024-12-18 PROCEDURE — 99214 OFFICE O/P EST MOD 30 MIN: CPT | Performed by: PHYSICIAN ASSISTANT

## 2024-12-18 PROCEDURE — G8428 CUR MEDS NOT DOCUMENT: HCPCS | Performed by: PHYSICIAN ASSISTANT

## 2024-12-18 PROCEDURE — 1090F PRES/ABSN URINE INCON ASSESS: CPT | Performed by: PHYSICIAN ASSISTANT

## 2024-12-30 SDOH — HEALTH STABILITY: PHYSICAL HEALTH: ON AVERAGE, HOW MANY DAYS PER WEEK DO YOU ENGAGE IN MODERATE TO STRENUOUS EXERCISE (LIKE A BRISK WALK)?: 5 DAYS

## 2024-12-30 SDOH — HEALTH STABILITY: PHYSICAL HEALTH: ON AVERAGE, HOW MANY MINUTES DO YOU ENGAGE IN EXERCISE AT THIS LEVEL?: 10 MIN

## 2024-12-30 ASSESSMENT — PATIENT HEALTH QUESTIONNAIRE - PHQ9
SUM OF ALL RESPONSES TO PHQ9 QUESTIONS 1 & 2: 2
SUM OF ALL RESPONSES TO PHQ QUESTIONS 1-9: 2
1. LITTLE INTEREST OR PLEASURE IN DOING THINGS: SEVERAL DAYS
2. FEELING DOWN, DEPRESSED OR HOPELESS: SEVERAL DAYS

## 2024-12-30 ASSESSMENT — LIFESTYLE VARIABLES
HOW MANY STANDARD DRINKS CONTAINING ALCOHOL DO YOU HAVE ON A TYPICAL DAY: PATIENT DOES NOT DRINK
HOW OFTEN DO YOU HAVE A DRINK CONTAINING ALCOHOL: 1
HOW OFTEN DO YOU HAVE SIX OR MORE DRINKS ON ONE OCCASION: 1
HOW OFTEN DO YOU HAVE A DRINK CONTAINING ALCOHOL: NEVER
HOW MANY STANDARD DRINKS CONTAINING ALCOHOL DO YOU HAVE ON A TYPICAL DAY: 0

## 2025-01-03 ENCOUNTER — OFFICE VISIT (OUTPATIENT)
Dept: FAMILY MEDICINE CLINIC | Facility: CLINIC | Age: 85
End: 2025-01-03

## 2025-01-03 VITALS
TEMPERATURE: 97.6 F | SYSTOLIC BLOOD PRESSURE: 121 MMHG | OXYGEN SATURATION: 97 % | DIASTOLIC BLOOD PRESSURE: 53 MMHG | HEIGHT: 61 IN | HEART RATE: 83 BPM | RESPIRATION RATE: 12 BRPM | WEIGHT: 109 LBS | BODY MASS INDEX: 20.58 KG/M2

## 2025-01-03 DIAGNOSIS — M54.50 CHRONIC BILATERAL LOW BACK PAIN WITHOUT SCIATICA: ICD-10-CM

## 2025-01-03 DIAGNOSIS — C17.0 MALIGNANT NEOPLASM OF DUODENUM (HCC): ICD-10-CM

## 2025-01-03 DIAGNOSIS — Z23 ENCOUNTER FOR IMMUNIZATION: ICD-10-CM

## 2025-01-03 DIAGNOSIS — F41.9 ANXIETY: ICD-10-CM

## 2025-01-03 DIAGNOSIS — G90.3 NEUROLOGIC ORTHOSTATIC HYPOTENSION (HCC): ICD-10-CM

## 2025-01-03 DIAGNOSIS — I48.91 ATRIAL FIBRILLATION WITH RAPID VENTRICULAR RESPONSE (HCC): ICD-10-CM

## 2025-01-03 DIAGNOSIS — E83.42 HYPOMAGNESEMIA: ICD-10-CM

## 2025-01-03 DIAGNOSIS — G89.29 CHRONIC BILATERAL LOW BACK PAIN WITHOUT SCIATICA: ICD-10-CM

## 2025-01-03 DIAGNOSIS — Z00.00 MEDICARE ANNUAL WELLNESS VISIT, SUBSEQUENT: Primary | ICD-10-CM

## 2025-01-03 DIAGNOSIS — E03.9 ACQUIRED HYPOTHYROIDISM: ICD-10-CM

## 2025-01-03 DIAGNOSIS — G20.A1 PARKINSON'S DISEASE, UNSPECIFIED WHETHER DYSKINESIA PRESENT, UNSPECIFIED WHETHER MANIFESTATIONS FLUCTUATE (HCC): ICD-10-CM

## 2025-01-03 LAB
ALBUMIN SERPL-MCNC: 4 G/DL (ref 3.2–4.6)
ALBUMIN/GLOB SERPL: 1.1 (ref 1–1.9)
ALP SERPL-CCNC: 141 U/L (ref 35–104)
ALT SERPL-CCNC: 10 U/L (ref 8–45)
ANION GAP SERPL CALC-SCNC: 11 MMOL/L (ref 7–16)
AST SERPL-CCNC: 25 U/L (ref 15–37)
BASOPHILS # BLD: 0.1 K/UL (ref 0–0.2)
BASOPHILS NFR BLD: 1 % (ref 0–2)
BILIRUB SERPL-MCNC: 0.3 MG/DL (ref 0–1.2)
BUN SERPL-MCNC: 21 MG/DL (ref 8–23)
CALCIUM SERPL-MCNC: 9.4 MG/DL (ref 8.8–10.2)
CHLORIDE SERPL-SCNC: 105 MMOL/L (ref 98–107)
CO2 SERPL-SCNC: 27 MMOL/L (ref 20–29)
CREAT SERPL-MCNC: 1.37 MG/DL (ref 0.6–1.1)
DIFFERENTIAL METHOD BLD: ABNORMAL
EOSINOPHIL # BLD: 0.1 K/UL (ref 0–0.8)
EOSINOPHIL NFR BLD: 2 % (ref 0.5–7.8)
ERYTHROCYTE [DISTWIDTH] IN BLOOD BY AUTOMATED COUNT: 14.2 % (ref 11.9–14.6)
GLOBULIN SER CALC-MCNC: 3.7 G/DL (ref 2.3–3.5)
GLUCOSE SERPL-MCNC: 66 MG/DL (ref 70–99)
HCT VFR BLD AUTO: 42.4 % (ref 35.8–46.3)
HGB BLD-MCNC: 13 G/DL (ref 11.7–15.4)
IMM GRANULOCYTES # BLD AUTO: 0 K/UL (ref 0–0.5)
IMM GRANULOCYTES NFR BLD AUTO: 0 % (ref 0–5)
LYMPHOCYTES # BLD: 1.8 K/UL (ref 0.5–4.6)
LYMPHOCYTES NFR BLD: 24 % (ref 13–44)
MAGNESIUM SERPL-MCNC: 2.3 MG/DL (ref 1.8–2.4)
MCH RBC QN AUTO: 28.4 PG (ref 26.1–32.9)
MCHC RBC AUTO-ENTMCNC: 30.7 G/DL (ref 31.4–35)
MCV RBC AUTO: 92.6 FL (ref 82–102)
MONOCYTES # BLD: 0.6 K/UL (ref 0.1–1.3)
MONOCYTES NFR BLD: 8 % (ref 4–12)
NEUTS SEG # BLD: 5 K/UL (ref 1.7–8.2)
NEUTS SEG NFR BLD: 65 % (ref 43–78)
NRBC # BLD: 0 K/UL (ref 0–0.2)
PLATELET # BLD AUTO: 295 K/UL (ref 150–450)
PMV BLD AUTO: 12.2 FL (ref 9.4–12.3)
POTASSIUM SERPL-SCNC: 4.5 MMOL/L (ref 3.5–5.1)
PROT SERPL-MCNC: 7.6 G/DL (ref 6.3–8.2)
RBC # BLD AUTO: 4.58 M/UL (ref 4.05–5.2)
SODIUM SERPL-SCNC: 143 MMOL/L (ref 136–145)
T4 FREE SERPL-MCNC: 0.7 NG/DL (ref 0.9–1.7)
TSH, 3RD GENERATION: 14.5 UIU/ML (ref 0.27–4.2)
WBC # BLD AUTO: 7.7 K/UL (ref 4.3–11.1)

## 2025-01-03 RX ORDER — TRAMADOL HYDROCHLORIDE 50 MG/1
50 TABLET ORAL EVERY 8 HOURS PRN
Qty: 30 TABLET | Refills: 3 | Status: SHIPPED | OUTPATIENT
Start: 2025-01-03 | End: 2025-02-12

## 2025-01-03 RX ORDER — DIAZEPAM 10 MG/1
10 TABLET ORAL EVERY 12 HOURS PRN
Qty: 30 TABLET | Refills: 3 | Status: SHIPPED | OUTPATIENT
Start: 2025-01-03 | End: 2025-03-04

## 2025-01-03 NOTE — PATIENT INSTRUCTIONS
for help?  Watch closely for changes in your health, and be sure to contact your doctor if:    You have new symptoms such as fever or a rash.     Your fatigue gets worse.     You have been feeling down, depressed, or hopeless. Or you may have lost interest in things that you usually enjoy.     You are not getting better as expected.   Where can you learn more?  Go to https://[email or web address].net/patientEd and enter W864 to learn more about \"Fatigue: Care Instructions.\"  Current as of: June 24, 2023  Content Version: 14.2  © 2024 [employer].   Care instructions adapted under license by [employer]. If you have questions about a medical condition or this instruction, always ask your healthcare professional. [employer] disclaims any warranty or liability for your use of this information.           Learning About Dental Care for Older Adults  Dental care for older adults: Overview  Dental care for older people is much the same as for younger adults. But older adults do have concerns that younger adults do not. Older adults may have problems with gum disease and decay on the roots of their teeth. They may need missing teeth replaced or broken fillings fixed. Or they may have dentures that need to be cared for. Some older adults may have trouble holding a toothbrush.  You can help remind the person you are caring for to brush and floss their teeth or to clean their dentures. In some cases, you may need to do the brushing and other dental care tasks. People who have trouble using their hands or who have dementia may need this extra help.  How can you help with dental care?  Normal dental care  To keep the teeth and gums healthy:  Brush the teeth with fluoride toothpaste twice a day--in the morning and at night--and floss at least once a day. Plaque can quickly build up on the teeth of older adults.  Watch for the signs of gum disease. These signs include gums that bleed after brushing or after

## 2025-01-03 NOTE — PROGRESS NOTES
BMI 20.60 kg/m²    General appearance: alert, cooperative, no distress, appears stated age  Neck: supple, symmetrical, trachea midline, no adenopathy, thyroid: not enlarged, symmetric, no tenderness/mass/nodules and no carotid bruit  Lungs: clear to auscultation bilaterally  Heart: regular rate and rhythm, S1, S2 normal, no murmur, click, rub or gallop  Extremities: extremities normal, atraumatic, no cyanosis or edema  Pulses: 2+ and symmetric  Skin: Skin color, texture, turgor normal. No rashes or lesions  Neurologic: Grossly normal             Allergies   Allergen Reactions    Aspirin Other (See Comments)     Painful stomach-- Hx of PUD    Morphine Other (See Comments)     Hallucinations    Namenda [Memantine] Headaches     Prior to Visit Medications    Medication Sig Taking? Authorizing Provider   diazePAM (VALIUM) 10 MG tablet Take 1 tablet by mouth every 12 hours as needed for Anxiety for up to 60 days. Max Daily Amount: 20 mg Yes Kelvin Durant MD   traMADol (ULTRAM) 50 MG tablet Take 1 tablet by mouth every 8 hours as needed for Pain for up to 40 days. Intended supply: 5 days. Take lowest dose possible to manage pain Max Daily Amount: 150 mg Yes Kelvin Durant MD   apixaban (ELIQUIS) 2.5 MG TABS tablet Take 1 tablet by mouth 2 times daily Yes Mekhi Simms MD   vitamin D (ERGOCALCIFEROL) 1.25 MG (14984 UT) CAPS capsule Take 1 capsule by mouth once a week Yes Kelvin Durant MD   levothyroxine (SYNTHROID) 25 MCG tablet Take 1 tablet by mouth Daily Yes Kelvin Durant MD   traMADol (ULTRAM) 50 MG tablet Take 1 tablet by mouth every 6 hours as needed for Pain. Yes Provider, MD Nancy   carbidopa-levodopa (SINEMET CR)  MG per extended release tablet Take 1 tablet by mouth nightly Yes Anali Ramirez MD   donepezil (ARICEPT) 10 MG tablet Take 1 tablet by mouth nightly Yes Kelvin Durant MD   potassium chloride (K-TAB) 10 MEQ extended release tablet Take 1

## 2025-01-09 ENCOUNTER — TELEPHONE (OUTPATIENT)
Dept: FAMILY MEDICINE CLINIC | Facility: CLINIC | Age: 85
End: 2025-01-09

## 2025-01-09 DIAGNOSIS — E03.9 ACQUIRED HYPOTHYROIDISM: ICD-10-CM

## 2025-01-09 RX ORDER — LEVOTHYROXINE SODIUM 50 UG/1
50 TABLET ORAL DAILY
Qty: 90 TABLET | Refills: 3 | Status: SHIPPED | OUTPATIENT
Start: 2025-01-09

## 2025-01-09 NOTE — TELEPHONE ENCOUNTER
Sent in prescription for:     Requested Prescriptions     Signed Prescriptions Disp Refills    levothyroxine (SYNTHROID) 50 MCG tablet 90 tablet 3     Sig: Take 1 tablet by mouth Daily     Authorizing Provider: SERGEY CAIN

## 2025-01-23 ENCOUNTER — OFFICE VISIT (OUTPATIENT)
Dept: FAMILY MEDICINE CLINIC | Facility: CLINIC | Age: 85
End: 2025-01-23

## 2025-01-23 VITALS
BODY MASS INDEX: 20.77 KG/M2 | HEART RATE: 86 BPM | WEIGHT: 110 LBS | SYSTOLIC BLOOD PRESSURE: 138 MMHG | HEIGHT: 61 IN | OXYGEN SATURATION: 98 % | RESPIRATION RATE: 16 BRPM | TEMPERATURE: 98.4 F | DIASTOLIC BLOOD PRESSURE: 66 MMHG

## 2025-01-23 DIAGNOSIS — R39.15 URINARY URGENCY: ICD-10-CM

## 2025-01-23 DIAGNOSIS — N30.00 ACUTE CYSTITIS WITHOUT HEMATURIA: ICD-10-CM

## 2025-01-23 DIAGNOSIS — R30.9 URINARY PAIN: Primary | ICD-10-CM

## 2025-01-23 RX ORDER — CIPROFLOXACIN 250 MG/1
250 TABLET, FILM COATED ORAL 2 TIMES DAILY
Qty: 14 TABLET | Refills: 0 | Status: SHIPPED | OUTPATIENT
Start: 2025-01-23 | End: 2025-01-30

## 2025-01-23 SDOH — ECONOMIC STABILITY: FOOD INSECURITY: WITHIN THE PAST 12 MONTHS, YOU WORRIED THAT YOUR FOOD WOULD RUN OUT BEFORE YOU GOT MONEY TO BUY MORE.: NEVER TRUE

## 2025-01-23 SDOH — ECONOMIC STABILITY: FOOD INSECURITY: WITHIN THE PAST 12 MONTHS, THE FOOD YOU BOUGHT JUST DIDN'T LAST AND YOU DIDN'T HAVE MONEY TO GET MORE.: NEVER TRUE

## 2025-01-23 ASSESSMENT — PATIENT HEALTH QUESTIONNAIRE - PHQ9
SUM OF ALL RESPONSES TO PHQ9 QUESTIONS 1 & 2: 0
SUM OF ALL RESPONSES TO PHQ QUESTIONS 1-9: 0
SUM OF ALL RESPONSES TO PHQ QUESTIONS 1-9: 0
2. FEELING DOWN, DEPRESSED OR HOPELESS: NOT AT ALL
SUM OF ALL RESPONSES TO PHQ QUESTIONS 1-9: 0
SUM OF ALL RESPONSES TO PHQ QUESTIONS 1-9: 0
1. LITTLE INTEREST OR PLEASURE IN DOING THINGS: NOT AT ALL

## 2025-01-23 ASSESSMENT — ENCOUNTER SYMPTOMS: ABDOMINAL PAIN: 0

## 2025-01-23 NOTE — PROGRESS NOTES
HISTORY OF PRESENT ILLNESS     Elisabet Santiago is a 84 y.o. female who presents for       HPI    Patient complains of a 2-week history of worsening urinary incontinence.  She has had some slight dysuria but has found that when she gets the urge to urinate she has to get somewhere quickly and has had several accidents.  No fever or increased back or abdominal pain.  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 Simms at [facility] cardio     Parkinson's disease ([facility]) dx 2009    followed by [facility] ; on med for control     Psychiatric disorder     anxiety: PRN meds    PUD (peptic ulcer disease)     SVT (supraventricular tachycardia) ([facility])     Syncope and collapse     last episode 2 months ago (noted 1/20/21)    Unspecified adverse effect of anesthesia     delayed awakening---     Weight loss        Allergies   Allergen Reactions    Aspirin Other (See Comments)     Painful stomach-- Hx of PUD    Morphine Other (See Comments)     Hallucinations    Namenda [Memantine] Headaches       Current Outpatient Medications   Medication Sig Dispense Refill    ciprofloxacin (CIPRO) 250 MG tablet Take 1 tablet by mouth 2 times daily for 7 days 14 tablet 0    levothyroxine (SYNTHROID) 50 MCG tablet Take 1 tablet by mouth Daily 90 tablet 3    diazePAM (VALIUM) 10 MG tablet Take 1 tablet by mouth every 12 hours as needed for Anxiety for up to 60 days. Max Daily Amount: 20 mg 30 tablet 3    traMADol (ULTRAM) 50 MG tablet Take 1 tablet by mouth every 8 hours as needed for

## 2025-01-24 ENCOUNTER — NURSE ONLY (OUTPATIENT)
Dept: FAMILY MEDICINE CLINIC | Facility: CLINIC | Age: 85
End: 2025-01-24

## 2025-01-24 DIAGNOSIS — N30.00 ACUTE CYSTITIS WITHOUT HEMATURIA: ICD-10-CM

## 2025-01-26 LAB
BACTERIA SPEC CULT: NORMAL
SERVICE CMNT-IMP: NORMAL

## 2025-01-30 DIAGNOSIS — E03.9 ACQUIRED HYPOTHYROIDISM: ICD-10-CM

## 2025-01-31 RX ORDER — LEVOTHYROXINE SODIUM 50 UG/1
50 TABLET ORAL DAILY
Qty: 90 TABLET | Refills: 1 | Status: SHIPPED | OUTPATIENT
Start: 2025-01-31

## 2025-02-26 RX ORDER — ERGOCALCIFEROL 1.25 MG/1
CAPSULE, LIQUID FILLED ORAL
Qty: 12 CAPSULE | Refills: 3 | Status: SHIPPED | OUTPATIENT
Start: 2025-02-26

## 2025-03-20 ENCOUNTER — OFFICE VISIT (OUTPATIENT)
Age: 85
End: 2025-03-20

## 2025-03-20 VITALS
BODY MASS INDEX: 20.77 KG/M2 | SYSTOLIC BLOOD PRESSURE: 118 MMHG | HEART RATE: 88 BPM | DIASTOLIC BLOOD PRESSURE: 70 MMHG | HEIGHT: 61 IN | WEIGHT: 110 LBS

## 2025-03-20 DIAGNOSIS — I48.0 PAROXYSMAL ATRIAL FIBRILLATION (HCC): Primary | ICD-10-CM

## 2025-03-20 DIAGNOSIS — I48.3 TYPICAL ATRIAL FLUTTER (HCC): ICD-10-CM

## 2025-03-20 RX ORDER — AMIODARONE HYDROCHLORIDE 200 MG/1
200 TABLET ORAL DAILY
Qty: 90 TABLET | Refills: 3 | Status: SHIPPED | OUTPATIENT
Start: 2025-03-20

## 2025-03-20 RX ORDER — NITROGLYCERIN 0.4 MG/1
0.4 TABLET SUBLINGUAL EVERY 5 MIN PRN
Qty: 25 TABLET | Refills: 3 | Status: SHIPPED | OUTPATIENT
Start: 2025-03-20

## 2025-03-20 ASSESSMENT — ENCOUNTER SYMPTOMS
SHORTNESS OF BREATH: 0
BLURRED VISION: 0
ORTHOPNEA: 0
DOUBLE VISION: 0
BLOATING: 0
HEMOPTYSIS: 0
COUGH: 0
ABDOMINAL PAIN: 0
VOMITING: 0
NAUSEA: 0
BACK PAIN: 0

## 2025-03-20 NOTE — PROGRESS NOTES
10 mg 3 times daily but patient is unsure.  See above with holding off Florinef which is on her list today.  Need to consider decreasing midodrine as well based on dosing.  Consideration for droxidopa if needed in the future. Discussed compression hoses/salt.   Consideration for binders if needed.  Discussed permissive hypertension      Palpitations-not controlled; in the setting currently of recurrent tachyarrhythmias.  See above.    Hypokalemia  -See above; hold off resuming Florinef.  Also hold off Lasix/potassium which was prescribed during ER visit from 7/2/2024 due to lower extremity edema; was noted to be severely hypokalemic at the time.    -Resolved hypokalemia on repeat BMP from 8/9/24 at 4.5      Carotid stenosis-noted hospital read but per vascular surgery evaluation, felt to be less than 50% bilaterally.  Followed by vascular surgery    Return in about 6 months (around 9/20/2025).     Mekhi Simms MD  3/20/2025  3:39 PM

## 2025-05-05 ENCOUNTER — OFFICE VISIT (OUTPATIENT)
Dept: FAMILY MEDICINE CLINIC | Facility: CLINIC | Age: 85
End: 2025-05-05
Payer: MEDICARE

## 2025-05-05 VITALS
TEMPERATURE: 97.4 F | HEIGHT: 61 IN | HEART RATE: 79 BPM | BODY MASS INDEX: 20.43 KG/M2 | SYSTOLIC BLOOD PRESSURE: 125 MMHG | RESPIRATION RATE: 16 BRPM | OXYGEN SATURATION: 98 % | WEIGHT: 108.2 LBS | DIASTOLIC BLOOD PRESSURE: 62 MMHG

## 2025-05-05 DIAGNOSIS — F41.9 ANXIETY: ICD-10-CM

## 2025-05-05 DIAGNOSIS — M54.50 CHRONIC BILATERAL LOW BACK PAIN WITHOUT SCIATICA: ICD-10-CM

## 2025-05-05 DIAGNOSIS — G89.29 CHRONIC BILATERAL LOW BACK PAIN WITHOUT SCIATICA: ICD-10-CM

## 2025-05-05 DIAGNOSIS — I48.91 ATRIAL FIBRILLATION WITH RAPID VENTRICULAR RESPONSE (HCC): ICD-10-CM

## 2025-05-05 DIAGNOSIS — E87.6 HYPOKALEMIA: ICD-10-CM

## 2025-05-05 DIAGNOSIS — E03.9 ACQUIRED HYPOTHYROIDISM: ICD-10-CM

## 2025-05-05 DIAGNOSIS — R32 URINARY INCONTINENCE, UNSPECIFIED TYPE: ICD-10-CM

## 2025-05-05 DIAGNOSIS — K21.9 GASTROESOPHAGEAL REFLUX DISEASE, UNSPECIFIED WHETHER ESOPHAGITIS PRESENT: ICD-10-CM

## 2025-05-05 DIAGNOSIS — N95.2 ATROPHIC VAGINITIS: ICD-10-CM

## 2025-05-05 DIAGNOSIS — I48.91 ATRIAL FIBRILLATION WITH RAPID VENTRICULAR RESPONSE (HCC): Primary | ICD-10-CM

## 2025-05-05 DIAGNOSIS — G20.A1 PARKINSON'S DISEASE, UNSPECIFIED WHETHER DYSKINESIA PRESENT, UNSPECIFIED WHETHER MANIFESTATIONS FLUCTUATE (HCC): ICD-10-CM

## 2025-05-05 DIAGNOSIS — C17.0 MALIGNANT NEOPLASM OF DUODENUM (HCC): ICD-10-CM

## 2025-05-05 LAB
ALBUMIN SERPL-MCNC: 4.1 G/DL (ref 3.2–4.6)
ALBUMIN/GLOB SERPL: 1.2 (ref 1–1.9)
ALP SERPL-CCNC: 126 U/L (ref 35–104)
ALT SERPL-CCNC: 8 U/L (ref 8–45)
ANION GAP SERPL CALC-SCNC: 12 MMOL/L (ref 7–16)
AST SERPL-CCNC: 29 U/L (ref 15–37)
BASOPHILS # BLD: 0.09 K/UL (ref 0–0.2)
BASOPHILS NFR BLD: 1.3 % (ref 0–2)
BILIRUB SERPL-MCNC: 0.4 MG/DL (ref 0–1.2)
BUN SERPL-MCNC: 17 MG/DL (ref 8–23)
CALCIUM SERPL-MCNC: 9.6 MG/DL (ref 8.8–10.2)
CHLORIDE SERPL-SCNC: 104 MMOL/L (ref 98–107)
CO2 SERPL-SCNC: 26 MMOL/L (ref 20–29)
CREAT SERPL-MCNC: 1.28 MG/DL (ref 0.6–1.1)
DIFFERENTIAL METHOD BLD: ABNORMAL
EOSINOPHIL # BLD: 0.07 K/UL (ref 0–0.8)
EOSINOPHIL NFR BLD: 1 % (ref 0.5–7.8)
ERYTHROCYTE [DISTWIDTH] IN BLOOD BY AUTOMATED COUNT: 14.8 % (ref 11.9–14.6)
GLOBULIN SER CALC-MCNC: 3.4 G/DL (ref 2.3–3.5)
GLUCOSE SERPL-MCNC: 90 MG/DL (ref 70–99)
HCT VFR BLD AUTO: 43.6 % (ref 35.8–46.3)
HGB BLD-MCNC: 13.6 G/DL (ref 11.7–15.4)
IMM GRANULOCYTES # BLD AUTO: 0.01 K/UL (ref 0–0.5)
IMM GRANULOCYTES NFR BLD AUTO: 0.1 % (ref 0–5)
LYMPHOCYTES # BLD: 1.31 K/UL (ref 0.5–4.6)
LYMPHOCYTES NFR BLD: 18.7 % (ref 13–44)
MAGNESIUM SERPL-MCNC: 2.3 MG/DL (ref 1.8–2.4)
MCH RBC QN AUTO: 29.2 PG (ref 26.1–32.9)
MCHC RBC AUTO-ENTMCNC: 31.2 G/DL (ref 31.4–35)
MCV RBC AUTO: 93.8 FL (ref 82–102)
MONOCYTES # BLD: 0.5 K/UL (ref 0.1–1.3)
MONOCYTES NFR BLD: 7.1 % (ref 4–12)
NEUTS SEG # BLD: 5.03 K/UL (ref 1.7–8.2)
NEUTS SEG NFR BLD: 71.8 % (ref 43–78)
NRBC # BLD: 0 K/UL (ref 0–0.2)
PLATELET # BLD AUTO: 249 K/UL (ref 150–450)
PMV BLD AUTO: 12.6 FL (ref 9.4–12.3)
POTASSIUM SERPL-SCNC: 4.7 MMOL/L (ref 3.5–5.1)
PROT SERPL-MCNC: 7.6 G/DL (ref 6.3–8.2)
RBC # BLD AUTO: 4.65 M/UL (ref 4.05–5.2)
SODIUM SERPL-SCNC: 141 MMOL/L (ref 136–145)
T4 FREE SERPL-MCNC: 1.2 NG/DL (ref 0.9–1.7)
TSH, 3RD GENERATION: 10 UIU/ML (ref 0.27–4.2)
WBC # BLD AUTO: 7 K/UL (ref 4.3–11.1)

## 2025-05-05 PROCEDURE — 99214 OFFICE O/P EST MOD 30 MIN: CPT | Performed by: FAMILY MEDICINE

## 2025-05-05 PROCEDURE — 1159F MED LIST DOCD IN RCRD: CPT | Performed by: FAMILY MEDICINE

## 2025-05-05 PROCEDURE — 1123F ACP DISCUSS/DSCN MKR DOCD: CPT | Performed by: FAMILY MEDICINE

## 2025-05-05 PROCEDURE — G8427 DOCREV CUR MEDS BY ELIG CLIN: HCPCS | Performed by: FAMILY MEDICINE

## 2025-05-05 PROCEDURE — G8420 CALC BMI NORM PARAMETERS: HCPCS | Performed by: FAMILY MEDICINE

## 2025-05-05 PROCEDURE — 0509F URINE INCON PLAN DOCD: CPT | Performed by: FAMILY MEDICINE

## 2025-05-05 PROCEDURE — 1090F PRES/ABSN URINE INCON ASSESS: CPT | Performed by: FAMILY MEDICINE

## 2025-05-05 PROCEDURE — 1126F AMNT PAIN NOTED NONE PRSNT: CPT | Performed by: FAMILY MEDICINE

## 2025-05-05 PROCEDURE — 1036F TOBACCO NON-USER: CPT | Performed by: FAMILY MEDICINE

## 2025-05-05 RX ORDER — CARBIDOPA/LEVODOPA 25MG-250MG
1 TABLET ORAL 4 TIMES DAILY
Qty: 360 TABLET | Refills: 3 | Status: SHIPPED | OUTPATIENT
Start: 2025-05-05

## 2025-05-05 RX ORDER — TRAMADOL HYDROCHLORIDE 50 MG/1
50 TABLET ORAL DAILY PRN
Qty: 30 TABLET | Refills: 3 | Status: SHIPPED | OUTPATIENT
Start: 2025-05-05 | End: 2025-09-02

## 2025-05-05 RX ORDER — DIAZEPAM 10 MG/1
10 TABLET ORAL DAILY PRN
Qty: 30 TABLET | Refills: 3 | Status: SHIPPED | OUTPATIENT
Start: 2025-05-05 | End: 2025-09-02

## 2025-05-05 RX ORDER — FAMOTIDINE 40 MG/1
40 TABLET, FILM COATED ORAL DAILY
Qty: 90 TABLET | Refills: 3 | Status: SHIPPED | OUTPATIENT
Start: 2025-05-05

## 2025-05-05 RX ORDER — MIDODRINE HYDROCHLORIDE 10 MG/1
10 TABLET ORAL 3 TIMES DAILY
Qty: 270 TABLET | Refills: 3 | Status: SHIPPED | OUTPATIENT
Start: 2025-05-05

## 2025-05-05 RX ORDER — LEVOTHYROXINE SODIUM 50 UG/1
50 TABLET ORAL DAILY
Qty: 90 TABLET | Refills: 1 | Status: SHIPPED | OUTPATIENT
Start: 2025-05-05

## 2025-05-05 RX ORDER — OXYBUTYNIN CHLORIDE 5 MG/1
TABLET ORAL
Qty: 180 TABLET | Refills: 3 | Status: SHIPPED | OUTPATIENT
Start: 2025-05-05

## 2025-05-05 RX ORDER — CONJUGATED ESTROGENS 0.62 MG/G
CREAM VAGINAL
Qty: 30 G | Refills: 2 | Status: SHIPPED | OUTPATIENT
Start: 2025-05-05

## 2025-05-05 RX ORDER — DONEPEZIL HYDROCHLORIDE 10 MG/1
10 TABLET, FILM COATED ORAL NIGHTLY
Qty: 90 TABLET | Refills: 3 | Status: SHIPPED | OUTPATIENT
Start: 2025-05-05

## 2025-05-05 RX ORDER — POTASSIUM CHLORIDE 750 MG/1
10 TABLET, EXTENDED RELEASE ORAL DAILY
Qty: 90 TABLET | Refills: 3 | Status: SHIPPED | OUTPATIENT
Start: 2025-05-05

## 2025-05-05 RX ORDER — CARBIDOPA AND LEVODOPA 50; 200 MG/1; MG/1
1 TABLET, EXTENDED RELEASE ORAL NIGHTLY
Qty: 90 TABLET | Refills: 3 | Status: SHIPPED | OUTPATIENT
Start: 2025-05-05

## 2025-05-05 NOTE — PROGRESS NOTES
today for hypothyroidism and anxiety.    Diagnoses and all orders for this visit:    Atrial fibrillation with rapid ventricular response (HCC)  -     Magnesium; Future    Hypokalemia  -     potassium chloride (K-TAB) 10 MEQ extended release tablet; Take 1 tablet by mouth daily  -     Comprehensive Metabolic Panel; Future    Atrophic vaginitis  -     estrogens conjugated (PREMARIN) 0.625 MG/GM CREA vaginal cream; PLACE 0.5 GRAM VAGINALLY 3 DAYS A WEEK    Gastroesophageal reflux disease, unspecified whether esophagitis present  -     famotidine (PEPCID) 40 MG tablet; Take 1 tablet by mouth daily    Acquired hypothyroidism  -     levothyroxine (SYNTHROID) 50 MCG tablet; Take 1 tablet by mouth Daily  -     CBC with Auto Differential; Future  -     TSH; Future  -     T4, Free; Future    Urinary incontinence, unspecified type  -     oxyBUTYnin (DITROPAN) 5 MG tablet; TAKE 1 TO 2 TABLETS AT BEDTIME ONLY    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 Max Daily Amount: 10 mg    Parkinson's disease, unspecified whether dyskinesia present, unspecified whether manifestations fluctuate ([facility])    Chronic bilateral low back pain without sciatica  -     traMADol (ULTRAM) 50 MG tablet; Take 1 tablet by mouth daily as needed for Pain for up to 120 days. Intended supply: 5 days. Take lowest dose possible to manage pain Max Daily Amount: 50 mg    Other orders  -     carbidopa-levodopa (SINEMET)  MG per tablet; Take 1 tablet by mouth 4 times daily  -     donepezil (ARICEPT) 10 MG tablet; Take 1 tablet by mouth nightly  -     carbidopa-levodopa (SINEMET CR)  MG per extended release tablet; Take 1 tablet by mouth nightly  -     midodrine (PROAMATINE) 10 MG tablet; Take 1 tablet by mouth 3 times daily      Assessment & Plan  1. Parkinson's disease.  - Currently on Sinemet 25/250 mg four times a day and Sinemet 50/200

## 2025-05-06 ENCOUNTER — RESULTS FOLLOW-UP (OUTPATIENT)
Dept: FAMILY MEDICINE CLINIC | Facility: CLINIC | Age: 85
End: 2025-05-06

## 2025-05-06 NOTE — RESULT ENCOUNTER NOTE
Hemoglobin is good at 13.6.  Blood sugar normal at 90, creatinine is stable at 1.2 and liver enzymes are normal.  T4 level or thyroid test is normal at 1.2 so continue on the same dosage of magnesium level is normal

## 2025-06-25 ENCOUNTER — OFFICE VISIT (OUTPATIENT)
Dept: FAMILY MEDICINE CLINIC | Facility: CLINIC | Age: 85
End: 2025-06-25
Payer: MEDICARE

## 2025-06-25 VITALS
HEART RATE: 72 BPM | SYSTOLIC BLOOD PRESSURE: 122 MMHG | OXYGEN SATURATION: 99 % | HEIGHT: 61 IN | WEIGHT: 110 LBS | BODY MASS INDEX: 20.77 KG/M2 | DIASTOLIC BLOOD PRESSURE: 51 MMHG

## 2025-06-25 DIAGNOSIS — M25.552 LEFT HIP PAIN: ICD-10-CM

## 2025-06-25 DIAGNOSIS — M54.50 LEFT-SIDED LOW BACK PAIN WITHOUT SCIATICA, UNSPECIFIED CHRONICITY: Primary | ICD-10-CM

## 2025-06-25 PROCEDURE — 1090F PRES/ABSN URINE INCON ASSESS: CPT | Performed by: PHYSICIAN ASSISTANT

## 2025-06-25 PROCEDURE — G8420 CALC BMI NORM PARAMETERS: HCPCS | Performed by: PHYSICIAN ASSISTANT

## 2025-06-25 PROCEDURE — G8427 DOCREV CUR MEDS BY ELIG CLIN: HCPCS | Performed by: PHYSICIAN ASSISTANT

## 2025-06-25 PROCEDURE — 1159F MED LIST DOCD IN RCRD: CPT | Performed by: PHYSICIAN ASSISTANT

## 2025-06-25 PROCEDURE — 1123F ACP DISCUSS/DSCN MKR DOCD: CPT | Performed by: PHYSICIAN ASSISTANT

## 2025-06-25 PROCEDURE — 1160F RVW MEDS BY RX/DR IN RCRD: CPT | Performed by: PHYSICIAN ASSISTANT

## 2025-06-25 PROCEDURE — 1125F AMNT PAIN NOTED PAIN PRSNT: CPT | Performed by: PHYSICIAN ASSISTANT

## 2025-06-25 PROCEDURE — 99213 OFFICE O/P EST LOW 20 MIN: CPT | Performed by: PHYSICIAN ASSISTANT

## 2025-06-25 PROCEDURE — 1036F TOBACCO NON-USER: CPT | Performed by: PHYSICIAN ASSISTANT

## 2025-06-25 RX ORDER — METHYLPREDNISOLONE 4 MG/1
TABLET ORAL
Qty: 1 KIT | Refills: 0 | Status: SHIPPED | OUTPATIENT
Start: 2025-06-25

## 2025-06-25 ASSESSMENT — ENCOUNTER SYMPTOMS
COLOR CHANGE: 0
SHORTNESS OF BREATH: 0
BACK PAIN: 1

## 2025-06-25 NOTE — PROGRESS NOTES
[facility] [address] [address]  Phone [phone]      Patient: Elisabet Santiago  YOB: 1940  Age 85 y.o.  Sex female  Medical Record:  [id number]  Visit Date: 06/25/25  Author:  Andrade Garner PA-C    [facility] Clinic Note    Chief Complaint   Patient presents with    Hip Pain     Left hip pain for about 2 weeks     Back Pain       History of Present Illness  History of Present Illness  The patient is an 85-year-old female who presents today with complaints of hip and back pain.    She has been experiencing discomfort in her left lower back and hip region for the past 2 weeks, with no history of falls or trauma. The pain occasionally radiates to the front, but primarily localizes to the back. She reports no skin changes such as rash, redness or swelling.  She does have a history of recurrent low back pain.  The pain does not extend down her leg, and she reports no numbness, tingling, or weakness in the leg. She denies any significant involvement in the groin area.  Bending over to  objects from the floor exacerbates her pain, leading her to avoid this activity. She takes Eliquis twice daily and therefore avoids use of any NSAIDs such as ibuprofen or Aleve.  Has taken some tramadol that she had from a previous issue but has not noted significant relief on it.  She occasionally takes Tylenol for pain management.  Denies urinary symptoms.  No difficulty controlling bowel or bladder.      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

## 2025-06-25 NOTE — PATIENT INSTRUCTIONS
*Take the Medrol dose pack as prescribed.  *You can use the Tramadol as needed.  *Recommend alternating ice (for 10 min) and heat (for 30 min), 3-4 times a day as able/needed.  *Return for any worsening or persistent symptoms.

## (undated) DEVICE — BLOCK BITE AD 60FR W/ VELC STRP ADDRESSES MOST PT AND

## (undated) DEVICE — CANNULA NSL ORAL AD FOR CAPNOFLEX CO2 O2 AIRLFE

## (undated) DEVICE — SYR 3ML LL TIP 1/10ML GRAD --

## (undated) DEVICE — SNARE POLYP SM W13MMXL240CM SHTH DIA2.4MM OVL FLX DISP

## (undated) DEVICE — KENDALL RADIOLUCENT FOAM MONITORING ELECTRODE RECTANGULAR SHAPE: Brand: KENDALL

## (undated) DEVICE — NDL PRT INJ NSAF BLNT 18GX1.5 --

## (undated) DEVICE — CONTAINER PREFIL FRMLN 40ML --

## (undated) DEVICE — SYR 5ML 1/5 GRAD LL NSAF LF --

## (undated) DEVICE — CONNECTOR TBNG OD5-7MM O2 END DISP

## (undated) DEVICE — FORCEPS BX L240CM JAW DIA2.8MM L CAP W/ NDL MIC MESH TOOTH

## (undated) DEVICE — SYR 50ML SLIP TIP NSAF LF STRL --

## (undated) DEVICE — FIAPC® PROBE W/ FILTER 2200 A OD 2.3MM/6.9FR; L 2.2M/7.2FT: Brand: ERBE

## (undated) DEVICE — REM POLYHESIVE ADULT PATIENT RETURN ELECTRODE: Brand: VALLEYLAB